# Patient Record
Sex: FEMALE | Race: WHITE | Employment: UNEMPLOYED | URBAN - METROPOLITAN AREA
[De-identification: names, ages, dates, MRNs, and addresses within clinical notes are randomized per-mention and may not be internally consistent; named-entity substitution may affect disease eponyms.]

---

## 2017-02-07 ENCOUNTER — OFFICE VISIT (OUTPATIENT)
Dept: FAMILY MEDICINE CLINIC | Age: 57
End: 2017-02-07

## 2017-02-07 VITALS
HEART RATE: 72 BPM | HEIGHT: 70 IN | TEMPERATURE: 97.4 F | SYSTOLIC BLOOD PRESSURE: 128 MMHG | BODY MASS INDEX: 28.92 KG/M2 | WEIGHT: 202 LBS | DIASTOLIC BLOOD PRESSURE: 80 MMHG

## 2017-02-07 DIAGNOSIS — E04.9 GOITER: ICD-10-CM

## 2017-02-07 DIAGNOSIS — E78.2 MIXED HYPERLIPIDEMIA: ICD-10-CM

## 2017-02-07 DIAGNOSIS — I10 ESSENTIAL HYPERTENSION, BENIGN: Primary | ICD-10-CM

## 2017-02-07 LAB
ALBUMIN SERPL-MCNC: 4.3 G/DL (ref 3.5–5)
ALP BLD-CCNC: 86 IU/L (ref 35–135)
ALT SERPL-CCNC: 26 IU/L (ref 10–60)
AST SERPL-CCNC: 25 IU/L (ref 10–40)
BILIRUB SERPL-MCNC: 1.3 MG/DL (ref 0–1.2)
BUN BLDV-MCNC: 16 MG/DL (ref 8–26)
CALCIUM SERPL-MCNC: 9.2 MG/DL (ref 8.5–10.5)
CHLORIDE BLD-SCNC: 104 MEQ/L (ref 101–111)
CHOLESTEROL, TOTAL: 196 MG/DL
CHOLESTEROL/HDL RATIO: 4.4 (ref 1.9–4.2)
CO2: 23 MEQ/L (ref 24–36)
CREAT SERPL-MCNC: 0.98 MG/DL (ref 0.44–1.03)
GFR AFRICAN AMERICAN: >60 ML/MIN/1.73 SQ METER
GFR NON-AFRICAN AMERICAN: 59 ML/MIN/1.73 SQ METER
GLUCOSE BLD-MCNC: 116 MG/DL (ref 70–99)
HCT VFR BLD CALC: 47 % (ref 36–46)
HDLC SERPL-MCNC: 45 MG/DL
HEMOGLOBIN: 15.8 G/DL (ref 12–15.2)
LDL CHOLESTEROL CALCULATED: 124 MG/DL
LDL/HDL RATIO: 2.8 (ref 1–4)
MCH RBC QN AUTO: 30 PG (ref 27–33)
MCHC RBC AUTO-ENTMCNC: 33 G/DL (ref 32–36)
MCV RBC AUTO: 90 FL (ref 82–97)
PDW BLD-RTO: 12.8 %
PLATELET # BLD: 203 THOU/MCL (ref 140–375)
POTASSIUM SERPL-SCNC: 4 MEQ/L (ref 3.6–5.1)
RBC # BLD: 5.27 MIL/MCL (ref 3.8–5.2)
SODIUM BLD-SCNC: 136 MEQ/L (ref 135–145)
TOTAL PROTEIN: 7.8 G/DL (ref 6–8)
TRIGL SERPL-MCNC: 137 MG/DL
TSH ULTRASENSITIVE: 2.62 MIU/L (ref 0.4–4.2)
WBC # BLD: 5.8 THOU/MCL (ref 3.6–10.5)

## 2017-02-07 PROCEDURE — 99213 OFFICE O/P EST LOW 20 MIN: CPT | Performed by: FAMILY MEDICINE

## 2017-02-10 ENCOUNTER — TELEPHONE (OUTPATIENT)
Dept: FAMILY MEDICINE CLINIC | Age: 57
End: 2017-02-10

## 2018-02-20 ENCOUNTER — OFFICE VISIT (OUTPATIENT)
Dept: FAMILY MEDICINE CLINIC | Age: 58
End: 2018-02-20

## 2018-02-20 VITALS
BODY MASS INDEX: 28.23 KG/M2 | HEART RATE: 68 BPM | WEIGHT: 197.2 LBS | DIASTOLIC BLOOD PRESSURE: 80 MMHG | SYSTOLIC BLOOD PRESSURE: 138 MMHG | TEMPERATURE: 98.1 F | HEIGHT: 70 IN

## 2018-02-20 DIAGNOSIS — E78.2 MIXED HYPERLIPIDEMIA: ICD-10-CM

## 2018-02-20 DIAGNOSIS — I10 ESSENTIAL HYPERTENSION, BENIGN: Primary | ICD-10-CM

## 2018-02-20 PROCEDURE — 99213 OFFICE O/P EST LOW 20 MIN: CPT | Performed by: FAMILY MEDICINE

## 2018-08-20 ENCOUNTER — OFFICE VISIT (OUTPATIENT)
Dept: FAMILY MEDICINE CLINIC | Age: 58
End: 2018-08-20

## 2018-08-20 VITALS
HEIGHT: 70 IN | DIASTOLIC BLOOD PRESSURE: 80 MMHG | HEART RATE: 68 BPM | SYSTOLIC BLOOD PRESSURE: 128 MMHG | WEIGHT: 188 LBS | TEMPERATURE: 97.6 F | BODY MASS INDEX: 26.92 KG/M2

## 2018-08-20 DIAGNOSIS — I10 ESSENTIAL HYPERTENSION, BENIGN: ICD-10-CM

## 2018-08-20 DIAGNOSIS — E78.2 MIXED HYPERLIPIDEMIA: ICD-10-CM

## 2018-08-20 DIAGNOSIS — I10 ESSENTIAL HYPERTENSION, BENIGN: Primary | ICD-10-CM

## 2018-08-20 LAB
A/G RATIO: 1.8 (ref 1.1–2.2)
ALBUMIN SERPL-MCNC: 4.9 G/DL (ref 3.4–5)
ALP BLD-CCNC: 118 U/L (ref 40–129)
ALT SERPL-CCNC: 15 U/L (ref 10–40)
ANION GAP SERPL CALCULATED.3IONS-SCNC: 16 MMOL/L (ref 3–16)
AST SERPL-CCNC: 19 U/L (ref 15–37)
BILIRUB SERPL-MCNC: 1 MG/DL (ref 0–1)
BUN BLDV-MCNC: 16 MG/DL (ref 7–20)
CALCIUM SERPL-MCNC: 10 MG/DL (ref 8.3–10.6)
CHLORIDE BLD-SCNC: 100 MMOL/L (ref 99–110)
CHOLESTEROL, TOTAL: 193 MG/DL (ref 0–199)
CO2: 25 MMOL/L (ref 21–32)
CREAT SERPL-MCNC: 0.8 MG/DL (ref 0.6–1.1)
GFR AFRICAN AMERICAN: >60
GFR NON-AFRICAN AMERICAN: >60
GLOBULIN: 2.7 G/DL
GLUCOSE BLD-MCNC: 112 MG/DL (ref 70–99)
HDLC SERPL-MCNC: 54 MG/DL (ref 40–60)
LDL CHOLESTEROL CALCULATED: 115 MG/DL
POTASSIUM SERPL-SCNC: 4.8 MMOL/L (ref 3.5–5.1)
SODIUM BLD-SCNC: 141 MMOL/L (ref 136–145)
TOTAL PROTEIN: 7.6 G/DL (ref 6.4–8.2)
TRIGL SERPL-MCNC: 118 MG/DL (ref 0–150)
VLDLC SERPL CALC-MCNC: 24 MG/DL

## 2018-08-20 PROCEDURE — 99213 OFFICE O/P EST LOW 20 MIN: CPT | Performed by: FAMILY MEDICINE

## 2018-08-20 RX ORDER — ATORVASTATIN CALCIUM 20 MG/1
20 TABLET, FILM COATED ORAL DAILY
Qty: 30 TABLET | Refills: 5 | Status: SHIPPED | OUTPATIENT
Start: 2018-08-20 | End: 2019-03-05 | Stop reason: SDUPTHER

## 2018-08-20 RX ORDER — AMLODIPINE BESYLATE 10 MG/1
10 TABLET ORAL DAILY
Qty: 30 TABLET | Refills: 5 | Status: SHIPPED | OUTPATIENT
Start: 2018-08-20 | End: 2019-03-05 | Stop reason: SDUPTHER

## 2018-08-20 ASSESSMENT — PATIENT HEALTH QUESTIONNAIRE - PHQ9
SUM OF ALL RESPONSES TO PHQ9 QUESTIONS 1 & 2: 0
SUM OF ALL RESPONSES TO PHQ QUESTIONS 1-9: 0
SUM OF ALL RESPONSES TO PHQ QUESTIONS 1-9: 0
2. FEELING DOWN, DEPRESSED OR HOPELESS: 0
1. LITTLE INTEREST OR PLEASURE IN DOING THINGS: 0

## 2018-08-20 NOTE — PATIENT INSTRUCTIONS
Stay with the diet  Do remember to get the colon check this year  Do consider getting the new shingle vaccine called Shingrix.

## 2018-08-20 NOTE — PROGRESS NOTES
Subjective:      Patient ID: Leonardo Regalado is a 61 y.o. female. Patient presents with:  6 Month Follow-Up: hypertension, lipids - pt is fasting    She is well and no c/o  She is due for colon  Still the same meds    YOB: 1960    Date of Visit:  8/20/2018     -- Peanut-Containing Drug Products -- Rash   -- Soybean-Containing Drug Products -- Rash   -- Ace Inhibitors    -- Aspirin     --  hives   -- Buckwheat    -- Cashew Nut Oil     --  Cashew protein   -- Nsaids    -- Other     --  Hazelnut, chick peas    Current Outpatient Prescriptions:  atorvastatin (LIPITOR) 20 MG tablet, TAKE ONE TABLET BY MOUTH DAILY, Disp: 30 tablet, Rfl: 5  amLODIPine (NORVASC) 10 MG tablet, TAKE ONE TABLET BY MOUTH DAILY, Disp: 30 tablet, Rfl: 5  LORazepam (ATIVAN) 1 MG tablet, Take 1 mg by mouth every 6 hours as needed for Anxiety, Disp: , Rfl:   fluticasone (FLONASE) 50 MCG/ACT nasal spray, 1 spray by Nasal route daily. , Disp: , Rfl:   metoprolol (TOPROL-XL) 25 MG XL tablet, Take 25 mg by mouth daily. , Disp: , Rfl:   EPINEPHrine (EPIPEN) 0.3 MG/0.3ML JO injection, Inject 0.3 mg into the muscle as needed. Use as directed for allergic reaction, Disp: , Rfl:   beclomethasone (QVAR) 40 MCG/ACT inhaler, Inhale 2 puffs into the lungs 2 times daily. , Disp: , Rfl:   spironolactone (ALDACTONE) 25 MG tablet, Take 25 mg by mouth 2 times daily. , Disp: , Rfl:   Loratadine 10 MG CAPS, Take 1 capsule by mouth daily. , Disp: , Rfl:   levalbuterol (XOPENEX) 0.63 MG/3ML nebulization, Take 1 ampule by nebulization every 4 hours as needed. , Disp: , Rfl:   levocetirizine (XYZAL) 5 MG tablet, Take 5 mg by mouth daily. , Disp: , Rfl:      No current facility-administered medications for this visit.       ---------------------------               08/20/18                      0857         ---------------------------   BP:          128/80         Site:       Left Arm        Position:     Sitting        Cuff Size: Large Adult      Pulse:         68           Temp:   97.6 °F (36.4 °C)   TempSrc:      Oral          Weight: 188 lb (85.3 kg)    Height: 5' 10\" (1.778 m)   ---------------------------  Body mass index is 26.98 kg/m². Wt Readings from Last 3 Encounters:      She is intentionally losing weight  08/20/18 : 188 lb (85.3 kg)  02/20/18 : 197 lb 3.2 oz (89.4 kg)  03/06/17 : 199 lb (90.3 kg)    BP Readings from Last 3 Encounters:  08/20/18 : 128/80  02/20/18 : 138/80  03/06/17 : 128/78          Review of Systems    Objective:   Physical Exam   Constitutional: She appears well-developed and well-nourished. No distress. Cardiovascular: Normal rate, regular rhythm and normal heart sounds. Exam reveals no gallop and no friction rub. No murmur heard. Pulmonary/Chest: Effort normal and breath sounds normal. No accessory muscle usage. No tachypnea. No respiratory distress. She has no decreased breath sounds. She has no wheezes. She has no rhonchi. She has no rales. Lymphadenopathy:     She has no cervical adenopathy. Right: No supraclavicular adenopathy present. Left: No supraclavicular adenopathy present. Neurological: She is alert. Skin: Skin is warm, dry and intact. She is not diaphoretic. No pallor. Assessment:        Diagnosis Orders   1. Essential hypertension, benign  Comprehensive Metabolic Panel    Lipid Panel   2. Mixed hyperlipidemia  Comprehensive Metabolic Panel    Lipid Panel       Overall well and no issues        Plan:      Stay with the diet  Do remember to get the colon check this year  Do consider getting the new shingle vaccine called Shingrix.          Juliano Bennett MD

## 2018-08-21 DIAGNOSIS — R73.09 ELEVATED GLUCOSE: Primary | ICD-10-CM

## 2018-08-22 DIAGNOSIS — R73.09 ELEVATED GLUCOSE: ICD-10-CM

## 2018-08-22 LAB
ESTIMATED AVERAGE GLUCOSE: 122.6 MG/DL
HBA1C MFR BLD: 5.9 %

## 2018-10-15 ENCOUNTER — HOSPITAL ENCOUNTER (OUTPATIENT)
Dept: WOMENS IMAGING | Age: 58
Discharge: HOME | End: 2018-10-15
Payer: COMMERCIAL

## 2018-10-15 DIAGNOSIS — Z12.31 VISIT FOR SCREENING MAMMOGRAM: ICD-10-CM

## 2018-10-15 PROCEDURE — 77063 BREAST TOMOSYNTHESIS BI: CPT

## 2018-11-28 ENCOUNTER — ANESTHESIA EVENT (OUTPATIENT)
Dept: ENDOSCOPY | Age: 58
End: 2018-11-28
Payer: COMMERCIAL

## 2018-11-28 RX ORDER — ACETAMINOPHEN 325 MG/1
650 TABLET ORAL EVERY 6 HOURS PRN
COMMUNITY

## 2018-11-29 ENCOUNTER — ANESTHESIA (OUTPATIENT)
Dept: ENDOSCOPY | Age: 58
End: 2018-11-29
Payer: COMMERCIAL

## 2018-11-29 ENCOUNTER — HOSPITAL ENCOUNTER (OUTPATIENT)
Age: 58
Setting detail: OUTPATIENT SURGERY
Discharge: HOME | End: 2018-11-29
Attending: INTERNAL MEDICINE | Admitting: INTERNAL MEDICINE
Payer: COMMERCIAL

## 2018-11-29 VITALS
TEMPERATURE: 96.9 F | RESPIRATION RATE: 16 BRPM | BODY MASS INDEX: 27.35 KG/M2 | OXYGEN SATURATION: 99 % | HEART RATE: 69 BPM | DIASTOLIC BLOOD PRESSURE: 76 MMHG | HEIGHT: 70 IN | WEIGHT: 191.03 LBS | SYSTOLIC BLOOD PRESSURE: 143 MMHG

## 2018-11-29 VITALS — DIASTOLIC BLOOD PRESSURE: 67 MMHG | OXYGEN SATURATION: 98 % | SYSTOLIC BLOOD PRESSURE: 139 MMHG

## 2018-11-29 DIAGNOSIS — Z12.11 COLON CANCER SCREENING: ICD-10-CM

## 2018-11-29 PROCEDURE — 3700000000 HC ANESTHESIA ATTENDED CARE: Performed by: INTERNAL MEDICINE

## 2018-11-29 PROCEDURE — S0028 INJECTION, FAMOTIDINE, 20 MG: HCPCS | Performed by: ANESTHESIOLOGY

## 2018-11-29 PROCEDURE — 88305 TISSUE EXAM BY PATHOLOGIST: CPT

## 2018-11-29 PROCEDURE — 2500000003 HC RX 250 WO HCPCS: Performed by: ANESTHESIOLOGY

## 2018-11-29 PROCEDURE — 6360000002 HC RX W HCPCS

## 2018-11-29 PROCEDURE — 2709999900 HC NON-CHARGEABLE SUPPLY: Performed by: INTERNAL MEDICINE

## 2018-11-29 PROCEDURE — 7100000011 HC PHASE II RECOVERY - ADDTL 15 MIN: Performed by: INTERNAL MEDICINE

## 2018-11-29 PROCEDURE — 7100000001 HC PACU RECOVERY - ADDTL 15 MIN: Performed by: INTERNAL MEDICINE

## 2018-11-29 PROCEDURE — 7100000000 HC PACU RECOVERY - FIRST 15 MIN: Performed by: INTERNAL MEDICINE

## 2018-11-29 PROCEDURE — 3700000001 HC ADD 15 MINUTES (ANESTHESIA): Performed by: INTERNAL MEDICINE

## 2018-11-29 PROCEDURE — 3609010600 HC COLONOSCOPY POLYPECTOMY SNARE/COLD BIOPSY: Performed by: INTERNAL MEDICINE

## 2018-11-29 PROCEDURE — 2580000003 HC RX 258: Performed by: ANESTHESIOLOGY

## 2018-11-29 PROCEDURE — 7100000010 HC PHASE II RECOVERY - FIRST 15 MIN: Performed by: INTERNAL MEDICINE

## 2018-11-29 PROCEDURE — 2500000003 HC RX 250 WO HCPCS

## 2018-11-29 PROCEDURE — 2580000003 HC RX 258

## 2018-11-29 RX ORDER — PROPOFOL 10 MG/ML
INJECTION, EMULSION INTRAVENOUS PRN
Status: DISCONTINUED | OUTPATIENT
Start: 2018-11-29 | End: 2018-11-29 | Stop reason: SDUPTHER

## 2018-11-29 RX ORDER — SODIUM CHLORIDE 0.9 % (FLUSH) 0.9 %
10 SYRINGE (ML) INJECTION EVERY 12 HOURS SCHEDULED
Status: DISCONTINUED | OUTPATIENT
Start: 2018-11-29 | End: 2018-11-29 | Stop reason: HOSPADM

## 2018-11-29 RX ORDER — LABETALOL HYDROCHLORIDE 5 MG/ML
5 INJECTION, SOLUTION INTRAVENOUS EVERY 10 MIN PRN
Status: DISCONTINUED | OUTPATIENT
Start: 2018-11-29 | End: 2018-11-29 | Stop reason: HOSPADM

## 2018-11-29 RX ORDER — SODIUM CHLORIDE 9 MG/ML
INJECTION, SOLUTION INTRAVENOUS CONTINUOUS
Status: DISCONTINUED | OUTPATIENT
Start: 2018-11-29 | End: 2018-11-29 | Stop reason: HOSPADM

## 2018-11-29 RX ORDER — PROPOFOL 10 MG/ML
INJECTION, EMULSION INTRAVENOUS CONTINUOUS PRN
Status: DISCONTINUED | OUTPATIENT
Start: 2018-11-29 | End: 2018-11-29 | Stop reason: SDUPTHER

## 2018-11-29 RX ORDER — PROMETHAZINE HYDROCHLORIDE 25 MG/ML
6.25 INJECTION, SOLUTION INTRAMUSCULAR; INTRAVENOUS
Status: DISCONTINUED | OUTPATIENT
Start: 2018-11-29 | End: 2018-11-29 | Stop reason: HOSPADM

## 2018-11-29 RX ORDER — SODIUM CHLORIDE 0.9 % (FLUSH) 0.9 %
10 SYRINGE (ML) INJECTION PRN
Status: DISCONTINUED | OUTPATIENT
Start: 2018-11-29 | End: 2018-11-29 | Stop reason: HOSPADM

## 2018-11-29 RX ORDER — ONDANSETRON 2 MG/ML
4 INJECTION INTRAMUSCULAR; INTRAVENOUS
Status: DISCONTINUED | OUTPATIENT
Start: 2018-11-29 | End: 2018-11-29 | Stop reason: HOSPADM

## 2018-11-29 RX ORDER — MIDAZOLAM HYDROCHLORIDE 1 MG/ML
INJECTION INTRAMUSCULAR; INTRAVENOUS PRN
Status: DISCONTINUED | OUTPATIENT
Start: 2018-11-29 | End: 2018-11-29 | Stop reason: SDUPTHER

## 2018-11-29 RX ORDER — SODIUM CHLORIDE 9 MG/ML
INJECTION, SOLUTION INTRAVENOUS CONTINUOUS PRN
Status: DISCONTINUED | OUTPATIENT
Start: 2018-11-29 | End: 2018-11-29 | Stop reason: SDUPTHER

## 2018-11-29 RX ORDER — LIDOCAINE HYDROCHLORIDE 20 MG/ML
INJECTION, SOLUTION EPIDURAL; INFILTRATION; INTRACAUDAL; PERINEURAL PRN
Status: DISCONTINUED | OUTPATIENT
Start: 2018-11-29 | End: 2018-11-29 | Stop reason: SDUPTHER

## 2018-11-29 RX ADMIN — PROPOFOL 140 MCG/KG/MIN: 10 INJECTION, EMULSION INTRAVENOUS at 11:35

## 2018-11-29 RX ADMIN — LIDOCAINE HYDROCHLORIDE 40 MG: 20 INJECTION, SOLUTION EPIDURAL; INFILTRATION; INTRACAUDAL; PERINEURAL at 11:35

## 2018-11-29 RX ADMIN — FAMOTIDINE 20 MG: 10 INJECTION, SOLUTION INTRAVENOUS at 10:58

## 2018-11-29 RX ADMIN — SODIUM CHLORIDE: 9 INJECTION, SOLUTION INTRAVENOUS at 10:58

## 2018-11-29 RX ADMIN — PROPOFOL 40 MG: 10 INJECTION, EMULSION INTRAVENOUS at 11:40

## 2018-11-29 RX ADMIN — PROPOFOL 80 MG: 10 INJECTION, EMULSION INTRAVENOUS at 11:35

## 2018-11-29 RX ADMIN — MIDAZOLAM 2 MG: 1 INJECTION INTRAMUSCULAR; INTRAVENOUS at 11:40

## 2018-11-29 ASSESSMENT — PULMONARY FUNCTION TESTS
PIF_VALUE: 0

## 2018-11-29 ASSESSMENT — PAIN - FUNCTIONAL ASSESSMENT: PAIN_FUNCTIONAL_ASSESSMENT: 0-10

## 2018-11-29 ASSESSMENT — PAIN SCALES - GENERAL
PAINLEVEL_OUTOF10: 0
PAINLEVEL_OUTOF10: 0

## 2018-11-29 NOTE — ANESTHESIA PRE PROCEDURE
138/80     BMI  Body mass index is 27.41 kg/m². Estimated body mass index is 27.41 kg/m² as calculated from the following:    Height as of this encounter: 5' 10\" (1.778 m). Weight as of this encounter: 191 lb 0.5 oz (86.7 kg). CBC   Lab Results   Component Value Date    WBC 6.2 02/20/2017    WBC 5.8 02/07/2017    RBC 5.23 02/20/2017    HGB 16.2 02/20/2017    HCT 49.8 02/20/2017    MCV 95.2 02/20/2017    RDW 13.3 02/20/2017     02/20/2017     CMP    Lab Results   Component Value Date     08/20/2018    K 4.8 08/20/2018     08/20/2018    CO2 25 08/20/2018    BUN 16 08/20/2018    CREATININE 0.8 08/20/2018    GFRAA >60 08/20/2018    GFRAA >60 03/21/2013    AGRATIO 1.8 08/20/2018    LABGLOM >60 08/20/2018    GLUCOSE 112 08/20/2018    PROT 7.6 08/20/2018    PROT 7.5 03/21/2013    CALCIUM 10.0 08/20/2018    BILITOT 1.0 08/20/2018    ALKPHOS 118 08/20/2018    AST 19 08/20/2018    ALT 15 08/20/2018     BMP    Lab Results   Component Value Date     08/20/2018    K 4.8 08/20/2018     08/20/2018    CO2 25 08/20/2018    BUN 16 08/20/2018    CREATININE 0.8 08/20/2018    CALCIUM 10.0 08/20/2018    GFRAA >60 08/20/2018    GFRAA >60 03/21/2013    LABGLOM >60 08/20/2018    GLUCOSE 112 08/20/2018     POCGlucose  No results for input(s): GLUCOSE in the last 72 hours.    Coags  No results found for: PROTIME, INR, APTT  HCG (If Applicable)   Lab Results   Component Value Date    PREGTESTUR negative 10/24/2014      ABGs No results found for: PHART, PO2ART, ACN8GNE, PYO3MZD, BEART, X9FVAOWA   Type & Screen (If Applicable)  No results found for: LABABO, LABRH                         BMI: Wt Readings from Last 3 Encounters:       NPO Status:   Date of last liquid consumption: 11/29/18   Time of last liquid consumption: 0700   Date of last solid food consumption: 11/28/18      Time of last solid consumption: 0830       Anesthesia Evaluation  Patient summary reviewed no history of anesthetic complications:

## 2019-02-21 ENCOUNTER — OFFICE VISIT (OUTPATIENT)
Dept: FAMILY MEDICINE CLINIC | Age: 59
End: 2019-02-21
Payer: COMMERCIAL

## 2019-02-21 VITALS
TEMPERATURE: 97.5 F | WEIGHT: 206 LBS | SYSTOLIC BLOOD PRESSURE: 124 MMHG | HEART RATE: 76 BPM | DIASTOLIC BLOOD PRESSURE: 80 MMHG | HEIGHT: 70 IN | BODY MASS INDEX: 29.49 KG/M2

## 2019-02-21 DIAGNOSIS — E78.2 MIXED HYPERLIPIDEMIA: ICD-10-CM

## 2019-02-21 DIAGNOSIS — I10 ESSENTIAL HYPERTENSION, BENIGN: Primary | ICD-10-CM

## 2019-02-21 DIAGNOSIS — I10 ESSENTIAL HYPERTENSION, BENIGN: ICD-10-CM

## 2019-02-21 DIAGNOSIS — Z23 NEEDS FLU SHOT: ICD-10-CM

## 2019-02-21 LAB
A/G RATIO: 1.5 (ref 1.1–2.2)
ALBUMIN SERPL-MCNC: 4.7 G/DL (ref 3.4–5)
ALP BLD-CCNC: 110 U/L (ref 40–129)
ALT SERPL-CCNC: 20 U/L (ref 10–40)
ANION GAP SERPL CALCULATED.3IONS-SCNC: 16 MMOL/L (ref 3–16)
AST SERPL-CCNC: 26 U/L (ref 15–37)
BASOPHILS ABSOLUTE: 0.1 K/UL (ref 0–0.2)
BASOPHILS RELATIVE PERCENT: 0.9 %
BILIRUB SERPL-MCNC: 1 MG/DL (ref 0–1)
BUN BLDV-MCNC: 22 MG/DL (ref 7–20)
CALCIUM SERPL-MCNC: 9.8 MG/DL (ref 8.3–10.6)
CHLORIDE BLD-SCNC: 103 MMOL/L (ref 99–110)
CHOLESTEROL, TOTAL: 180 MG/DL (ref 0–199)
CO2: 23 MMOL/L (ref 21–32)
CREAT SERPL-MCNC: 1 MG/DL (ref 0.6–1.1)
EOSINOPHILS ABSOLUTE: 0.1 K/UL (ref 0–0.6)
EOSINOPHILS RELATIVE PERCENT: 2 %
GFR AFRICAN AMERICAN: >60
GFR NON-AFRICAN AMERICAN: 57
GLOBULIN: 3.2 G/DL
GLUCOSE BLD-MCNC: 107 MG/DL (ref 70–99)
HCT VFR BLD CALC: 43.4 % (ref 36–48)
HDLC SERPL-MCNC: 55 MG/DL (ref 40–60)
HEMOGLOBIN: 13.8 G/DL (ref 12–16)
LDL CHOLESTEROL CALCULATED: 97 MG/DL
LYMPHOCYTES ABSOLUTE: 1.3 K/UL (ref 1–5.1)
LYMPHOCYTES RELATIVE PERCENT: 23.4 %
MCH RBC QN AUTO: 27.3 PG (ref 26–34)
MCHC RBC AUTO-ENTMCNC: 31.9 G/DL (ref 31–36)
MCV RBC AUTO: 85.4 FL (ref 80–100)
MONOCYTES ABSOLUTE: 0.5 K/UL (ref 0–1.3)
MONOCYTES RELATIVE PERCENT: 9.2 %
NEUTROPHILS ABSOLUTE: 3.6 K/UL (ref 1.7–7.7)
NEUTROPHILS RELATIVE PERCENT: 64.5 %
PDW BLD-RTO: 17.5 % (ref 12.4–15.4)
PLATELET # BLD: 256 K/UL (ref 135–450)
PMV BLD AUTO: 9.3 FL (ref 5–10.5)
POTASSIUM SERPL-SCNC: 4.4 MMOL/L (ref 3.5–5.1)
RBC # BLD: 5.08 M/UL (ref 4–5.2)
SODIUM BLD-SCNC: 142 MMOL/L (ref 136–145)
TOTAL PROTEIN: 7.9 G/DL (ref 6.4–8.2)
TRIGL SERPL-MCNC: 138 MG/DL (ref 0–150)
VLDLC SERPL CALC-MCNC: 28 MG/DL
WBC # BLD: 5.6 K/UL (ref 4–11)

## 2019-02-21 PROCEDURE — 90686 IIV4 VACC NO PRSV 0.5 ML IM: CPT | Performed by: FAMILY MEDICINE

## 2019-02-21 PROCEDURE — 99213 OFFICE O/P EST LOW 20 MIN: CPT | Performed by: FAMILY MEDICINE

## 2019-02-21 PROCEDURE — 90471 IMMUNIZATION ADMIN: CPT | Performed by: FAMILY MEDICINE

## 2019-02-21 ASSESSMENT — ENCOUNTER SYMPTOMS
DIARRHEA: 0
ABDOMINAL DISTENTION: 0
CONSTIPATION: 0
SHORTNESS OF BREATH: 0
NAUSEA: 0
ABDOMINAL PAIN: 0
BLOOD IN STOOL: 0
VOMITING: 0
CHEST TIGHTNESS: 0

## 2019-02-21 ASSESSMENT — PATIENT HEALTH QUESTIONNAIRE - PHQ9
SUM OF ALL RESPONSES TO PHQ9 QUESTIONS 1 & 2: 0
1. LITTLE INTEREST OR PLEASURE IN DOING THINGS: 0
2. FEELING DOWN, DEPRESSED OR HOPELESS: 0
SUM OF ALL RESPONSES TO PHQ QUESTIONS 1-9: 0
SUM OF ALL RESPONSES TO PHQ QUESTIONS 1-9: 0

## 2019-03-05 RX ORDER — ATORVASTATIN CALCIUM 20 MG/1
TABLET, FILM COATED ORAL
Qty: 30 TABLET | Refills: 5 | Status: SHIPPED | OUTPATIENT
Start: 2019-03-05 | End: 2019-09-05 | Stop reason: SDUPTHER

## 2019-03-05 RX ORDER — AMLODIPINE BESYLATE 10 MG/1
TABLET ORAL
Qty: 30 TABLET | Refills: 5 | Status: SHIPPED | OUTPATIENT
Start: 2019-03-05 | End: 2019-09-05 | Stop reason: SDUPTHER

## 2019-08-23 ENCOUNTER — OFFICE VISIT (OUTPATIENT)
Dept: FAMILY MEDICINE CLINIC | Age: 59
End: 2019-08-23
Payer: COMMERCIAL

## 2019-08-23 VITALS
TEMPERATURE: 97.5 F | DIASTOLIC BLOOD PRESSURE: 80 MMHG | WEIGHT: 205 LBS | SYSTOLIC BLOOD PRESSURE: 120 MMHG | HEART RATE: 72 BPM | HEIGHT: 70 IN | BODY MASS INDEX: 29.35 KG/M2

## 2019-08-23 DIAGNOSIS — R73.09 ELEVATED GLUCOSE: ICD-10-CM

## 2019-08-23 DIAGNOSIS — E78.2 MIXED HYPERLIPIDEMIA: ICD-10-CM

## 2019-08-23 DIAGNOSIS — R79.89 ABNORMAL CBC: ICD-10-CM

## 2019-08-23 DIAGNOSIS — I10 ESSENTIAL HYPERTENSION, BENIGN: Primary | ICD-10-CM

## 2019-08-23 DIAGNOSIS — I10 ESSENTIAL HYPERTENSION, BENIGN: ICD-10-CM

## 2019-08-23 LAB
A/G RATIO: 1.5 (ref 1.1–2.2)
ALBUMIN SERPL-MCNC: 4.7 G/DL (ref 3.4–5)
ALP BLD-CCNC: 118 U/L (ref 40–129)
ALT SERPL-CCNC: 19 U/L (ref 10–40)
ANION GAP SERPL CALCULATED.3IONS-SCNC: 13 MMOL/L (ref 3–16)
AST SERPL-CCNC: 21 U/L (ref 15–37)
BASOPHILS ABSOLUTE: 0 K/UL (ref 0–0.2)
BASOPHILS RELATIVE PERCENT: 0.9 %
BILIRUB SERPL-MCNC: 1 MG/DL (ref 0–1)
BUN BLDV-MCNC: 17 MG/DL (ref 7–20)
CALCIUM SERPL-MCNC: 9.9 MG/DL (ref 8.3–10.6)
CHLORIDE BLD-SCNC: 102 MMOL/L (ref 99–110)
CHOLESTEROL, TOTAL: 180 MG/DL (ref 0–199)
CO2: 24 MMOL/L (ref 21–32)
CREAT SERPL-MCNC: 1 MG/DL (ref 0.6–1.1)
EOSINOPHILS ABSOLUTE: 0.1 K/UL (ref 0–0.6)
EOSINOPHILS RELATIVE PERCENT: 1.1 %
GFR AFRICAN AMERICAN: >60
GFR NON-AFRICAN AMERICAN: 57
GLOBULIN: 3.2 G/DL
GLUCOSE BLD-MCNC: 116 MG/DL (ref 70–99)
HCT VFR BLD CALC: 41.3 % (ref 36–48)
HDLC SERPL-MCNC: 54 MG/DL (ref 40–60)
HEMOGLOBIN: 13.4 G/DL (ref 12–16)
LDL CHOLESTEROL CALCULATED: 102 MG/DL
LYMPHOCYTES ABSOLUTE: 1.1 K/UL (ref 1–5.1)
LYMPHOCYTES RELATIVE PERCENT: 22.1 %
MCH RBC QN AUTO: 27.2 PG (ref 26–34)
MCHC RBC AUTO-ENTMCNC: 32.4 G/DL (ref 31–36)
MCV RBC AUTO: 84 FL (ref 80–100)
MONOCYTES ABSOLUTE: 0.4 K/UL (ref 0–1.3)
MONOCYTES RELATIVE PERCENT: 7.5 %
NEUTROPHILS ABSOLUTE: 3.5 K/UL (ref 1.7–7.7)
NEUTROPHILS RELATIVE PERCENT: 68.4 %
PDW BLD-RTO: 20.4 % (ref 12.4–15.4)
PLATELET # BLD: 220 K/UL (ref 135–450)
PMV BLD AUTO: 9.1 FL (ref 5–10.5)
POTASSIUM SERPL-SCNC: 4.5 MMOL/L (ref 3.5–5.1)
RBC # BLD: 4.91 M/UL (ref 4–5.2)
SODIUM BLD-SCNC: 139 MMOL/L (ref 136–145)
TOTAL PROTEIN: 7.9 G/DL (ref 6.4–8.2)
TRIGL SERPL-MCNC: 121 MG/DL (ref 0–150)
VLDLC SERPL CALC-MCNC: 24 MG/DL
WBC # BLD: 5.1 K/UL (ref 4–11)

## 2019-08-23 PROCEDURE — 99214 OFFICE O/P EST MOD 30 MIN: CPT | Performed by: FAMILY MEDICINE

## 2019-08-23 ASSESSMENT — ENCOUNTER SYMPTOMS
CONSTIPATION: 0
ABDOMINAL PAIN: 0
VOMITING: 0
ABDOMINAL DISTENTION: 0
SHORTNESS OF BREATH: 0
CHEST TIGHTNESS: 0
BLOOD IN STOOL: 0
DIARRHEA: 0
NAUSEA: 0

## 2019-08-23 NOTE — PROGRESS NOTES
0757         ---------------------------   BP:          120/80         Site:    Left Upper Arm     Position:     Sitting        Cuff Size:  Medium Adult      Pulse:         72           Temp:   97.5 °F (36.4 °C)   TempSrc:      Oral          Weight:  205 lb (93 kg)     Height: 5' 10\" (1.778 m)   ---------------------------  Body mass index is 29.41 kg/m². Wt Readings from Last 3 Encounters:  08/23/19 : 205 lb (93 kg)  02/21/19 : 206 lb (93.4 kg)  11/29/18 : 191 lb 0.5 oz (86.7 kg)    BP Readings from Last 3 Encounters:  08/23/19 : 120/80  02/21/19 : 124/80  11/29/18 : 139/67        Review of Systems   Constitutional: Negative for appetite change, chills, fever and unexpected weight change. Respiratory: Negative for chest tightness and shortness of breath. Cardiovascular: Negative for chest pain, palpitations and leg swelling. Gastrointestinal: Negative for abdominal distention, abdominal pain, blood in stool, constipation, diarrhea, nausea and vomiting. Genitourinary: Negative for difficulty urinating, dysuria and hematuria. Neurological: Negative for headaches. Objective:   Physical Exam   Constitutional: She appears well-developed and well-nourished. No distress. Cardiovascular: Normal rate, regular rhythm and normal heart sounds. Exam reveals no gallop and no friction rub. No murmur heard. Pulmonary/Chest: Effort normal and breath sounds normal. No accessory muscle usage. No tachypnea. No respiratory distress. She has no decreased breath sounds. She has no wheezes. She has no rhonchi. She has no rales. Abdominal: Soft. Normal appearance and bowel sounds are normal. She exhibits no distension, no abdominal bruit and no mass. There is no hepatosplenomegaly. There is no tenderness. There is no guarding. Lymphadenopathy:     She has no cervical adenopathy. Right: No supraclavicular adenopathy present.         Left: No supraclavicular adenopathy present. Neurological: She is alert. Skin: Skin is warm, dry and intact. She is not diaphoretic. No cyanosis. No pallor. Nails show no clubbing. Psychiatric: She has a normal mood and affect. Assessment:        Diagnosis Orders   1. Essential hypertension, benign  Comprehensive Metabolic Panel    Lipid Panel   2. Mixed hyperlipidemia  Comprehensive Metabolic Panel    Lipid Panel   3. Elevated glucose  Comprehensive Metabolic Panel    Hemoglobin A1C       Overall well  She recently seen new allergist and is working well  She will be monitored here for the cbc now. Dr Edith Javier office felt they did not need to see any more. She donates blood regular.        Plan:      Continue the medicines  Stay with the diet  See in 6 months        [place] MD Abel

## 2019-08-24 LAB
ESTIMATED AVERAGE GLUCOSE: 125.5 MG/DL
HBA1C MFR BLD: 6 %

## 2019-08-26 DIAGNOSIS — R79.89 ABNORMAL CBC MEASUREMENT: Primary | ICD-10-CM

## 2019-08-27 DIAGNOSIS — R79.89 ABNORMAL CBC MEASUREMENT: ICD-10-CM

## 2019-08-27 LAB
BLOOD SMEAR REVIEW: NORMAL
FOLATE: 17 NG/ML (ref 4.78–24.2)
HCT VFR BLD CALC: 40.2 % (ref 36–48)
HEMOGLOBIN: 13.5 G/DL (ref 12–16)
IRON SATURATION: 15 % (ref 15–50)
IRON: 54 UG/DL (ref 37–145)
MCH RBC QN AUTO: 27.5 PG (ref 26–34)
MCHC RBC AUTO-ENTMCNC: 33.5 G/DL (ref 31–36)
MCV RBC AUTO: 82.1 FL (ref 80–100)
PDW BLD-RTO: 19.9 % (ref 12.4–15.4)
PLATELET # BLD: 231 K/UL (ref 135–450)
PMV BLD AUTO: 8.7 FL (ref 5–10.5)
RBC # BLD: 4.9 M/UL (ref 4–5.2)
TOTAL IRON BINDING CAPACITY: 364 UG/DL (ref 260–445)
VITAMIN B-12: 433 PG/ML (ref 211–911)
WBC # BLD: 5.4 K/UL (ref 4–11)

## 2019-08-28 LAB — HEMATOLOGY PATH CONSULT: NORMAL

## 2019-09-05 RX ORDER — AMLODIPINE BESYLATE 10 MG/1
TABLET ORAL
Qty: 30 TABLET | Refills: 4 | Status: SHIPPED | OUTPATIENT
Start: 2019-09-05 | End: 2020-02-04

## 2019-09-05 RX ORDER — ATORVASTATIN CALCIUM 20 MG/1
TABLET, FILM COATED ORAL
Qty: 30 TABLET | Refills: 4 | Status: SHIPPED | OUTPATIENT
Start: 2019-09-05 | End: 2020-02-04

## 2019-09-17 ENCOUNTER — OFFICE VISIT (OUTPATIENT)
Dept: DERMATOLOGY | Age: 59
End: 2019-09-17
Payer: COMMERCIAL

## 2019-09-17 DIAGNOSIS — L82.0 SEBORRHEIC KERATOSES, INFLAMED: Primary | ICD-10-CM

## 2019-09-17 DIAGNOSIS — D22.9 MULTIPLE BENIGN MELANOCYTIC NEVI: ICD-10-CM

## 2019-09-17 DIAGNOSIS — Z12.83 SKIN CANCER SCREENING: ICD-10-CM

## 2019-09-17 PROCEDURE — 11102 TANGNTL BX SKIN SINGLE LES: CPT | Performed by: DERMATOLOGY

## 2019-09-17 PROCEDURE — 99203 OFFICE O/P NEW LOW 30 MIN: CPT | Performed by: DERMATOLOGY

## 2019-09-17 PROCEDURE — 11103 TANGNTL BX SKIN EA SEP/ADDL: CPT | Performed by: DERMATOLOGY

## 2019-09-17 PROCEDURE — 17110 DESTRUCTION B9 LES UP TO 14: CPT | Performed by: DERMATOLOGY

## 2019-09-17 NOTE — PROGRESS NOTES
2 years and prn changes  Discussed plan with patient and/or primary caretaker. Patient to call clinic with any questions / concerns. Reviewed side effects of treatment(s) and/or medication(s) with patient and/or primary caretaker.    AVS provided or is available on [facility]   ____________________________________________________________________________   Emani Jolley MD, MPH, FAAD  [facility], Cristian Gallo

## 2019-09-17 NOTE — LETTER
[facility]  [address] [place] [address]  Phone: [phone]  Fax: [phone]    Kim Mtz MD        September 17, 2019     Juan Carlos Munoz MD  [address] [place] [address]    Patient: Yee Arteaga  MR Number: [id number]  YOB: 1960  Date of Visit: 9/17/2019    Dear Dr. Juan Carlos Munoz:    Thank you for the request for consultation for Ernestine Sahu to me for the evaluation of moles. Below are the relevant portions of my assessment and plan of care. If you have questions, please do not hesitate to call me. I look forward to following [facility] along with you.     Sincerely,        Kim Mtz MD

## 2019-10-15 ENCOUNTER — HOSPITAL ENCOUNTER (OUTPATIENT)
Dept: WOMENS IMAGING | Age: 59
Discharge: HOME | End: 2019-10-15
Payer: COMMERCIAL

## 2019-10-15 DIAGNOSIS — Z12.31 BREAST CANCER SCREENING BY MAMMOGRAM: ICD-10-CM

## 2019-10-15 PROCEDURE — 77063 BREAST TOMOSYNTHESIS BI: CPT

## 2019-10-17 PROBLEM — Z12.83 SKIN CANCER SCREENING: Status: RESOLVED | Noted: 2019-09-17 | Resolved: 2019-10-17

## 2020-02-04 RX ORDER — ATORVASTATIN CALCIUM 20 MG/1
TABLET, FILM COATED ORAL
Qty: 30 TABLET | Refills: 5 | Status: SHIPPED | OUTPATIENT
Start: 2020-02-04 | End: 2020-07-30

## 2020-02-04 RX ORDER — AMLODIPINE BESYLATE 10 MG/1
TABLET ORAL
Qty: 30 TABLET | Refills: 5 | Status: SHIPPED | OUTPATIENT
Start: 2020-02-04 | End: 2020-07-30

## 2020-03-06 ENCOUNTER — OFFICE VISIT (OUTPATIENT)
Dept: FAMILY MEDICINE CLINIC | Age: 60
End: 2020-03-06
Payer: COMMERCIAL

## 2020-03-06 VITALS
DIASTOLIC BLOOD PRESSURE: 80 MMHG | WEIGHT: 205 LBS | TEMPERATURE: 97.3 F | HEART RATE: 72 BPM | SYSTOLIC BLOOD PRESSURE: 122 MMHG | HEIGHT: 70 IN | BODY MASS INDEX: 29.35 KG/M2

## 2020-03-06 DIAGNOSIS — I10 ESSENTIAL HYPERTENSION, BENIGN: ICD-10-CM

## 2020-03-06 DIAGNOSIS — E78.2 MIXED HYPERLIPIDEMIA: ICD-10-CM

## 2020-03-06 DIAGNOSIS — R73.09 ELEVATED GLUCOSE: ICD-10-CM

## 2020-03-06 LAB
A/G RATIO: 1.4 (ref 1.1–2.2)
ALBUMIN SERPL-MCNC: 4.5 G/DL (ref 3.4–5)
ALP BLD-CCNC: 122 U/L (ref 40–129)
ALT SERPL-CCNC: 24 U/L (ref 10–40)
ANION GAP SERPL CALCULATED.3IONS-SCNC: 14 MMOL/L (ref 3–16)
AST SERPL-CCNC: 21 U/L (ref 15–37)
BILIRUB SERPL-MCNC: 0.8 MG/DL (ref 0–1)
BUN BLDV-MCNC: 17 MG/DL (ref 7–20)
CALCIUM SERPL-MCNC: 9.5 MG/DL (ref 8.3–10.6)
CHLORIDE BLD-SCNC: 105 MMOL/L (ref 99–110)
CHOLESTEROL, TOTAL: 163 MG/DL (ref 0–199)
CO2: 23 MMOL/L (ref 21–32)
CREAT SERPL-MCNC: 0.9 MG/DL (ref 0.6–1.2)
ESTIMATED AVERAGE GLUCOSE: 119.8 MG/DL
GFR AFRICAN AMERICAN: >60
GFR NON-AFRICAN AMERICAN: >60
GLOBULIN: 3.3 G/DL
GLUCOSE BLD-MCNC: 120 MG/DL (ref 70–99)
HBA1C MFR BLD: 5.8 %
HDLC SERPL-MCNC: 45 MG/DL (ref 40–60)
LDL CHOLESTEROL CALCULATED: 99 MG/DL
POTASSIUM SERPL-SCNC: 4.6 MMOL/L (ref 3.5–5.1)
SODIUM BLD-SCNC: 142 MMOL/L (ref 136–145)
TOTAL PROTEIN: 7.8 G/DL (ref 6.4–8.2)
TRIGL SERPL-MCNC: 93 MG/DL (ref 0–150)
VLDLC SERPL CALC-MCNC: 19 MG/DL

## 2020-03-06 PROCEDURE — 99214 OFFICE O/P EST MOD 30 MIN: CPT | Performed by: FAMILY MEDICINE

## 2020-03-06 ASSESSMENT — PATIENT HEALTH QUESTIONNAIRE - PHQ9
SUM OF ALL RESPONSES TO PHQ QUESTIONS 1-9: 0
SUM OF ALL RESPONSES TO PHQ QUESTIONS 1-9: 0
SUM OF ALL RESPONSES TO PHQ9 QUESTIONS 1 & 2: 0
2. FEELING DOWN, DEPRESSED OR HOPELESS: 0
1. LITTLE INTEREST OR PLEASURE IN DOING THINGS: 0

## 2020-03-06 ASSESSMENT — ENCOUNTER SYMPTOMS
DIARRHEA: 0
NAUSEA: 0
ABDOMINAL DISTENTION: 0
ABDOMINAL PAIN: 0
CHEST TIGHTNESS: 0
SHORTNESS OF BREATH: 0
VOMITING: 0
CONSTIPATION: 0
BLOOD IN STOOL: 0

## 2020-03-06 NOTE — PROGRESS NOTES
nightly , Disp: , Rfl:     No current facility-administered medications for this visit.       ---------------------------               03/06/20                      0832         ---------------------------   BP:          122/80         Site:    Left Upper Arm     Position:     Sitting        Cuff Size:   Large Adult      Pulse:         72           Temp:   97.3 °F (36.3 °C)   TempSrc:      Oral          Weight:  205 lb (93 kg)     Height: 5' 10\" (1.778 m)   ---------------------------  Body mass index is 29.41 kg/m². Wt Readings from Last 3 Encounters:  03/06/20 : 205 lb (93 kg)  08/23/19 : 205 lb (93 kg)  02/21/19 : 206 lb (93.4 kg)    BP Readings from Last 3 Encounters:  03/06/20 : 122/80  08/23/19 : 120/80  02/21/19 : 124/80        Review of Systems   Constitutional: Negative for appetite change, chills, fever and unexpected weight change. Respiratory: Negative for chest tightness and shortness of breath. Cardiovascular: Negative for chest pain, palpitations and leg swelling. Gastrointestinal: Negative for abdominal distention, abdominal pain, blood in stool, constipation, diarrhea, nausea and vomiting. 3-6 months of gerd and some dysphagia. otc did help but only used for short time   Genitourinary: Negative for dysuria, frequency and hematuria. Neurological: Negative for headaches. Objective:   Physical Exam  Constitutional:       General: She is not in acute distress. Appearance: Normal appearance. She is well-developed. She is not ill-appearing or diaphoretic. HENT:      Head: Normocephalic and atraumatic. Eyes:      General: No scleral icterus. Neck:      Musculoskeletal: Neck supple. Thyroid: No thyroid mass or thyromegaly. Cardiovascular:      Rate and Rhythm: Normal rate and regular rhythm. Heart sounds: Normal heart sounds. No murmur. No friction rub. No gallop.        Comments:     Pulmonary:      Effort: Pulmonary effort is normal. No tachypnea, accessory muscle usage or respiratory distress. Breath sounds: Normal breath sounds. No decreased breath sounds, wheezing, rhonchi or rales. Abdominal:      General: Bowel sounds are normal. There is no distension or abdominal bruit. Palpations: Abdomen is soft. There is no hepatomegaly, splenomegaly, mass or pulsatile mass. Tenderness: There is no abdominal tenderness. There is no guarding. Lymphadenopathy:      Cervical: No cervical adenopathy. Upper Body:      Right upper body: No supraclavicular adenopathy. Left upper body: No supraclavicular adenopathy. Skin:     General: Skin is warm and dry. Coloration: Skin is not pale. Nails: There is no clubbing. Neurological:      General: No focal deficit present. Mental Status: She is alert. Psychiatric:         Mood and Affect: Mood normal.         Speech: Speech normal.         Assessment:        Diagnosis Orders   1. Mixed hyperlipidemia  Lipid Panel    Comprehensive Metabolic Panel   2. Essential hypertension, benign  Lipid Panel    Comprehensive Metabolic Panel   3. Elevated glucose  Hemoglobin A1C    Comprehensive Metabolic Panel   4. Gastroesophageal reflux disease, esophagitis presence not specified     5.  Dysphagia, unspecified type       Will need gi referral  Overall well  No change in meds  Encouraged to use otc ppi      Plan:      See dr Lynda Elena for the recent symptoms   See back in 6 months        Faizan Hernandez MD

## 2020-09-08 ENCOUNTER — OFFICE VISIT (OUTPATIENT)
Dept: FAMILY MEDICINE CLINIC | Age: 60
End: 2020-09-08
Payer: COMMERCIAL

## 2020-09-08 VITALS
WEIGHT: 197 LBS | HEIGHT: 70 IN | DIASTOLIC BLOOD PRESSURE: 82 MMHG | TEMPERATURE: 97.2 F | SYSTOLIC BLOOD PRESSURE: 128 MMHG | BODY MASS INDEX: 28.2 KG/M2

## 2020-09-08 PROCEDURE — 90471 IMMUNIZATION ADMIN: CPT | Performed by: FAMILY MEDICINE

## 2020-09-08 PROCEDURE — 90686 IIV4 VACC NO PRSV 0.5 ML IM: CPT | Performed by: FAMILY MEDICINE

## 2020-09-08 PROCEDURE — 99213 OFFICE O/P EST LOW 20 MIN: CPT | Performed by: FAMILY MEDICINE

## 2020-09-08 NOTE — PROGRESS NOTES
Subjective:      Patient ID: Itz Peters is a 63 y.o. female. Patient presents with:  6 Month Follow-Up: lipids, hypertension - pt is fasting     She is well   No c/o  She is on the same meds  She wanted flu shot   She has no pb with flu shot in past    YOB: 1960    Date of Visit:  9/8/2020     -- Peanut-Containing Drug Products -- Rash   -- Soybean-Containing Drug Products -- Rash   -- Ace Inhibitors    -- Aspirin     --  hives   -- Buckwheat    -- Cashew Nut Oil     --  Cashew protein   -- Nsaids    -- Other     --  Hazelnut, chick peas    Current Outpatient Medications:  amLODIPine (NORVASC) 10 MG tablet, TAKE ONE TABLET BY MOUTH DAILY, Disp: 30 tablet, Rfl: 4  atorvastatin (LIPITOR) 20 MG tablet, TAKE ONE TABLET BY MOUTH DAILY, Disp: 30 tablet, Rfl: 4  Fluticasone Furoate (ARNUITY ELLIPTA IN), Inhale 1 puff into the lungs daily Indications: pt is using 200 , Disp: , Rfl:   Phenylephrine HCl (SUDAFED PE CONGESTION PO), Take 1 tablet by mouth as needed, Disp: , Rfl:   acetaminophen (TYLENOL) 325 MG tablet, Take 650 mg by mouth every 6 hours as needed for Pain, Disp: , Rfl:   LORazepam (ATIVAN) 1 MG tablet, Take 1 mg by mouth every 6 hours as needed for Anxiety, Disp: , Rfl:   fluticasone (FLONASE) 50 MCG/ACT nasal spray, 1 spray by Nasal route daily. , Disp: , Rfl:   metoprolol (TOPROL-XL) 25 MG XL tablet, Take 25 mg by mouth daily. , Disp: , Rfl:   EPINEPHrine (EPIPEN) 0.3 MG/0.3ML JO injection, Inject 0.3 mg into the muscle as needed. Use as directed for allergic reaction, Disp: , Rfl:   spironolactone (ALDACTONE) 25 MG tablet, Take 25 mg by mouth 2 times daily. , Disp: , Rfl:   Loratadine 10 MG CAPS, Take 1 capsule by mouth daily. , Disp: , Rfl:   levalbuterol (XOPENEX) 0.63 MG/3ML nebulization, 1 ampule every 4 hours as needed , Disp: , Rfl:   levocetirizine (XYZAL) 5 MG tablet, Take 5 mg by mouth nightly , Disp: , Rfl:     No current facility-administered medications for this visit. ---------------------------               09/08/20                      0926         ---------------------------   BP:          128/82         Site:    Left Upper Arm     Position:     Sitting        Cuff Size:   Large Adult      Temp:   97.2 °F (36.2 °C)   TempSrc:    Temporal        Weight: 197 lb (89.4 kg)    Height: 5' 10\" (1.778 m)   ---------------------------  Body mass index is 28.27 kg/m². Wt Readings from Last 3 Encounters:  09/08/20 : 197 lb (89.4 kg)  03/06/20 : 205 lb (93 kg)  08/23/19 : 205 lb (93 kg)    BP Readings from Last 3 Encounters:  09/08/20 : 128/82  03/06/20 : 122/80  08/23/19 : 120/80            Review of Systems    Objective:   Physical Exam  Constitutional:       General: She is not in acute distress. Appearance: Normal appearance. She is well-developed. She is not ill-appearing or diaphoretic. Eyes:      General: No scleral icterus. Neck:      Musculoskeletal: Neck supple. Thyroid: No thyroid mass or thyromegaly. Cardiovascular:      Rate and Rhythm: Normal rate and regular rhythm. Heart sounds: Normal heart sounds. No murmur. No friction rub. No gallop. Pulmonary:      Effort: Pulmonary effort is normal. No tachypnea, accessory muscle usage or respiratory distress. Breath sounds: Normal breath sounds. No decreased breath sounds, wheezing, rhonchi or rales. Lymphadenopathy:      Cervical: No cervical adenopathy. Upper Body:      Right upper body: No supraclavicular adenopathy. Left upper body: No supraclavicular adenopathy. Skin:     General: Skin is warm and dry. Coloration: Skin is not pale. Neurological:      Mental Status: She is alert. Assessment:        Diagnosis Orders   1. Essential hypertension, benign     2.  Needs flu shot         She is here for the above  She is on otc prilosec  She did have a egd few months ago   On 7/16/20  Heme on 6/12/20 concern for low grade polycythemia I got info from [facility] for her and emailed same      Plan:      Continue the same medicines  See in about 6 months         Aric Christie MD

## 2020-09-08 NOTE — PROGRESS NOTES
Vaccine Information Sheet, \"Influenza - Inactivated\"  given to Eva Goldberg, or parent/legal guardian of  Eva Goldberg and verbalized understanding. Patient responses:    Have you ever had a reaction to a flu vaccine? No  Do you have any current illness? No  Have you ever had Guillian Aurora Syndrome? No  Do you have a serious allergy to any of the follow: Neomycin, Polymyxin, Thimerosal, eggs or egg products? No    Flu vaccine given per order. Please see immunization tab. Risks and benefits explained. Current VIS given.

## 2021-03-09 ENCOUNTER — IMMUNIZATION (OUTPATIENT)
Dept: PRIMARY CARE CLINIC | Age: 61
End: 2021-03-09
Payer: COMMERCIAL

## 2021-03-09 PROCEDURE — 0011A COVID-19, MODERNA VACCINE 100MCG/0.5ML DOSE: CPT | Performed by: FAMILY MEDICINE

## 2021-03-09 PROCEDURE — 91301 COVID-19, MODERNA VACCINE 100MCG/0.5ML DOSE: CPT | Performed by: FAMILY MEDICINE

## 2021-03-12 ENCOUNTER — OFFICE VISIT (OUTPATIENT)
Dept: FAMILY MEDICINE CLINIC | Age: 61
End: 2021-03-12
Payer: COMMERCIAL

## 2021-03-12 VITALS
SYSTOLIC BLOOD PRESSURE: 122 MMHG | HEIGHT: 70 IN | HEART RATE: 68 BPM | BODY MASS INDEX: 29.35 KG/M2 | WEIGHT: 205 LBS | TEMPERATURE: 98.4 F | DIASTOLIC BLOOD PRESSURE: 84 MMHG

## 2021-03-12 DIAGNOSIS — M54.42 CHRONIC LEFT-SIDED LOW BACK PAIN WITH LEFT-SIDED SCIATICA: ICD-10-CM

## 2021-03-12 DIAGNOSIS — E78.2 MIXED HYPERLIPIDEMIA: ICD-10-CM

## 2021-03-12 DIAGNOSIS — I10 ESSENTIAL HYPERTENSION, BENIGN: ICD-10-CM

## 2021-03-12 DIAGNOSIS — I10 ESSENTIAL HYPERTENSION, BENIGN: Primary | ICD-10-CM

## 2021-03-12 DIAGNOSIS — R73.09 ELEVATED GLUCOSE: ICD-10-CM

## 2021-03-12 DIAGNOSIS — G89.29 CHRONIC LEFT-SIDED LOW BACK PAIN WITH LEFT-SIDED SCIATICA: ICD-10-CM

## 2021-03-12 LAB
A/G RATIO: 1.3 (ref 1.1–2.2)
ALBUMIN SERPL-MCNC: 4.6 G/DL (ref 3.4–5)
ALP BLD-CCNC: 124 U/L (ref 40–129)
ALT SERPL-CCNC: 16 U/L (ref 10–40)
ANION GAP SERPL CALCULATED.3IONS-SCNC: 13 MMOL/L (ref 3–16)
AST SERPL-CCNC: 21 U/L (ref 15–37)
BILIRUB SERPL-MCNC: 0.9 MG/DL (ref 0–1)
BUN BLDV-MCNC: 17 MG/DL (ref 7–20)
CALCIUM SERPL-MCNC: 9.3 MG/DL (ref 8.3–10.6)
CHLORIDE BLD-SCNC: 102 MMOL/L (ref 99–110)
CHOLESTEROL, TOTAL: 165 MG/DL (ref 0–199)
CO2: 23 MMOL/L (ref 21–32)
CREAT SERPL-MCNC: 1 MG/DL (ref 0.6–1.2)
GFR AFRICAN AMERICAN: >60
GFR NON-AFRICAN AMERICAN: 56
GLOBULIN: 3.5 G/DL
GLUCOSE BLD-MCNC: 120 MG/DL (ref 70–99)
HDLC SERPL-MCNC: 39 MG/DL (ref 40–60)
LDL CHOLESTEROL CALCULATED: 100 MG/DL
POTASSIUM SERPL-SCNC: 4.5 MMOL/L (ref 3.5–5.1)
SODIUM BLD-SCNC: 138 MMOL/L (ref 136–145)
TOTAL PROTEIN: 8.1 G/DL (ref 6.4–8.2)
TRIGL SERPL-MCNC: 131 MG/DL (ref 0–150)
VLDLC SERPL CALC-MCNC: 26 MG/DL

## 2021-03-12 PROCEDURE — 99214 OFFICE O/P EST MOD 30 MIN: CPT | Performed by: FAMILY MEDICINE

## 2021-03-12 RX ORDER — PREDNISONE 10 MG/1
TABLET ORAL
Qty: 16 TABLET | Refills: 0 | Status: SHIPPED | OUTPATIENT
Start: 2021-03-12 | End: 2021-04-30

## 2021-03-12 ASSESSMENT — ENCOUNTER SYMPTOMS
DIARRHEA: 0
SHORTNESS OF BREATH: 0
CONSTIPATION: 0
CHEST TIGHTNESS: 0
ABDOMINAL DISTENTION: 0
VOMITING: 0
NAUSEA: 0
ABDOMINAL PAIN: 0
BLOOD IN STOOL: 0

## 2021-03-12 ASSESSMENT — PATIENT HEALTH QUESTIONNAIRE - PHQ9
SUM OF ALL RESPONSES TO PHQ9 QUESTIONS 1 & 2: 0
SUM OF ALL RESPONSES TO PHQ QUESTIONS 1-9: 0
SUM OF ALL RESPONSES TO PHQ QUESTIONS 1-9: 0

## 2021-03-12 NOTE — PATIENT INSTRUCTIONS
See dr Zluma Younger for a check on the lower back   Do get fasting blood work  Do not get the 2nd covid vaccine  See us back in 6 months  Consider the shingle vaccine

## 2021-03-12 NOTE — PROGRESS NOTES
Subjective:      Patient ID: Bee Espinoza is a 63 y.o. female. Chief Complaint   Patient presents with    6 Month Follow-Up     hypertension, lipids - pt is fasting        Patient presents with:  6 Month Follow-Up: hypertension, lipids - pt is fasting    She had first covid and did have some reaction to it  Lip swelling and facial  swelling and dizzy about 30 min later  Better with pepcid and benadryl  Feels fine now    Lower back injury in past from a fall  And has had some pain in past  Intermittent and this time seems getting worse since summer of 2020   First started 9/2013  Has had epidural inpast  She see chiropracter  Will go down the left leg  She wants an ortho for this   Leg numbness and foot numbness  Movement bending is worse  chiropractor normal helps      She is taking her medicines    YOB: 1960    Date of Visit:  3/12/2021     -- Peanut-Containing Drug Products -- Rash   -- Soybean-Containing Drug Products -- Rash   -- Ace Inhibitors    -- Aspirin     --  hives   -- Buckwheat    -- Cashew Nut Oil     --  Cashew protein   -- Nsaids    -- Other     --  Hazelnut, chick peas    Current Outpatient Medications:  amLODIPine (NORVASC) 10 MG tablet, TAKE ONE TABLET BY MOUTH DAILY, Disp: 30 tablet, Rfl: 5  atorvastatin (LIPITOR) 20 MG tablet, TAKE ONE TABLET BY MOUTH DAILY, Disp: 30 tablet, Rfl: 5  Fluticasone Furoate (ARNUITY ELLIPTA IN), Inhale 1 puff into the lungs daily Indications: pt is using 200 , Disp: , Rfl:   Phenylephrine HCl (SUDAFED PE CONGESTION PO), Take 1 tablet by mouth as needed, Disp: , Rfl:   acetaminophen (TYLENOL) 325 MG tablet, Take 650 mg by mouth every 6 hours as needed for Pain, Disp: , Rfl:   LORazepam (ATIVAN) 1 MG tablet, Take 1 mg by mouth every 6 hours as needed for Anxiety, Disp: , Rfl:   fluticasone (FLONASE) 50 MCG/ACT nasal spray, 1 spray by Nasal route daily. , Disp: , Rfl:   metoprolol (TOPROL-XL) 25 MG XL tablet, Take 25 mg by mouth daily. , Disp: , Rfl:   EPINEPHrine (EPIPEN) 0.3 MG/0.3ML JO injection, Inject 0.3 mg into the muscle as needed. Use as directed for allergic reaction, Disp: , Rfl:   spironolactone (ALDACTONE) 25 MG tablet, Take 25 mg by mouth 2 times daily. , Disp: , Rfl:   Loratadine 10 MG CAPS, Take 1 capsule by mouth daily. , Disp: , Rfl:   levalbuterol (XOPENEX) 0.63 MG/3ML nebulization, 1 ampule every 4 hours as needed , Disp: , Rfl:   levocetirizine (XYZAL) 5 MG tablet, Take 5 mg by mouth nightly , Disp: , Rfl:     No current facility-administered medications for this visit.       ---------------------------               03/12/21                      0828         ---------------------------   BP:          122/84         Site:    Left Upper Arm     Position:     Sitting        Cuff Size:   Large Adult      Pulse:         68           Temp:   98.4 °F (36.9 °C)   TempSrc:    Temporal        Weight:  205 lb (93 kg)     Height: 5' 10\" (1.778 m)   ---------------------------  Body mass index is 29.41 kg/m². Wt Readings from Last 3 Encounters:  03/12/21 : 205 lb (93 kg)  09/08/20 : 197 lb (89.4 kg)  03/06/20 : 205 lb (93 kg)    BP Readings from Last 3 Encounters:  03/12/21 : 122/84  09/08/20 : 128/82  03/06/20 : 122/80              Review of Systems   Constitutional: Negative for appetite change, chills, fever and unexpected weight change. Respiratory: Negative for chest tightness and shortness of breath. Cardiovascular: Negative for chest pain, palpitations and leg swelling. Gastrointestinal: Negative for abdominal distention, abdominal pain, blood in stool, constipation, diarrhea, nausea and vomiting. No loss of control stool   Genitourinary: Negative for difficulty urinating, dysuria and hematuria. No loss control   Neurological: Negative for headaches. Objective:   Physical Exam  Constitutional:       General: She is not in acute distress. Appearance: Normal appearance. She is well-developed. She is not ill-appearing or diaphoretic. HENT:      Head: Normocephalic and atraumatic. Eyes:      General: No scleral icterus. Neck:      Musculoskeletal: Neck supple. Thyroid: No thyroid mass or thyromegaly. Cardiovascular:      Rate and Rhythm: Normal rate and regular rhythm. Heart sounds: Normal heart sounds. No murmur. No friction rub. No gallop. Pulmonary:      Effort: Pulmonary effort is normal. No tachypnea, accessory muscle usage or respiratory distress. Breath sounds: Normal breath sounds. No decreased breath sounds, wheezing, rhonchi or rales. Musculoskeletal:      Comments: SLL is positive on the left leg  She is tender over the Lspine to palpate     Lymphadenopathy:      Cervical: No cervical adenopathy. Upper Body:      Right upper body: No supraclavicular adenopathy. Left upper body: No supraclavicular adenopathy. Skin:     General: Skin is warm and dry. Coloration: Skin is not pale. Neurological:      General: No focal deficit present. Mental Status: She is alert. Deep Tendon Reflexes:      Reflex Scores:       Patellar reflexes are 2+ on the right side and 2+ on the left side. Achilles reflexes are 2+ on the right side and 2+ on the left side. Assessment:        Diagnosis Orders   1. Essential hypertension, benign  Lipid Panel    Comprehensive Metabolic Panel   2. Chronic left-sided low back pain with left-sided sciatica     3. Elevated glucose  Hemoglobin A1C    Comprehensive Metabolic Panel   4. Mixed hyperlipidemia  Lipid Panel    Comprehensive Metabolic Panel       she see allergakil oneill and dr Corrina Boucher for htn  Note from allergist reviewed seen on 1/11/21  Discussed getting the shingle vaccine    She has seen ortho  Will add steroid. Orders Placed This Encounter   Medications    predniSONE (DELTASONE) 10 MG tablet     Sig: Prednisone 10 mg. #16.  Take 2 po bid for 2 days, then 1 po bid for 3 days, then 1 po daily for 2 days.  Then stop     Dispense:  16 tablet     Refill:  0           Plan:      See dr Barbara Springer for a check on the lower back   Do get fasting blood work  Do not get the 2nd covid vaccine  See us back in 6 months  Consider the shingle vaccine         Negrito Pascual MD

## 2021-03-13 LAB
ESTIMATED AVERAGE GLUCOSE: 122.6 MG/DL
HBA1C MFR BLD: 5.9 %

## 2021-04-20 ENCOUNTER — OFFICE VISIT (OUTPATIENT)
Dept: ORTHOPEDIC SURGERY | Age: 61
End: 2021-04-20
Payer: COMMERCIAL

## 2021-04-20 ENCOUNTER — TELEPHONE (OUTPATIENT)
Dept: ORTHOPEDIC SURGERY | Age: 61
End: 2021-04-20

## 2021-04-20 VITALS — BODY MASS INDEX: 29.35 KG/M2 | HEIGHT: 70 IN | WEIGHT: 205 LBS

## 2021-04-20 DIAGNOSIS — M54.50 LUMBAR PAIN: Primary | ICD-10-CM

## 2021-04-20 PROCEDURE — 99203 OFFICE O/P NEW LOW 30 MIN: CPT | Performed by: PHYSICIAN ASSISTANT

## 2021-04-20 NOTE — PROGRESS NOTES
COLONOSCOPY  8/31/15    polyps, dr Chris Jacob, repeat 3 years    COLONOSCOPY N/A 11/29/2018    COLONOSCOPY POLYPECTOMY SNARE/COLD BIOPSY performed by Paresh Helton MD at [address] [facility]  5/2003    OTHER SURGICAL HISTORY  7/2013    uterine ablation       Current Medications:     Current Outpatient Medications:     predniSONE (DELTASONE) 10 MG tablet, Prednisone 10 mg. #16. Take 2 po bid for 2 days, then 1 po bid for 3 days,  then 1 po daily for 2 days. Then stop, Disp: 16 tablet, Rfl: 0    amLODIPine (NORVASC) 10 MG tablet, TAKE ONE TABLET BY MOUTH DAILY, Disp: 30 tablet, Rfl: 5    atorvastatin (LIPITOR) 20 MG tablet, TAKE ONE TABLET BY MOUTH DAILY, Disp: 30 tablet, Rfl: 5    Fluticasone Furoate (ARNUITY ELLIPTA IN), Inhale 1 puff into the lungs daily Indications: pt is using 200 , Disp: , Rfl:     Phenylephrine HCl (SUDAFED PE CONGESTION PO), Take 1 tablet by mouth as needed, Disp: , Rfl:     acetaminophen (TYLENOL) 325 MG tablet, Take 650 mg by mouth every 6 hours as needed for Pain, Disp: , Rfl:     LORazepam (ATIVAN) 1 MG tablet, Take 1 mg by mouth every 6 hours as needed for Anxiety, Disp: , Rfl:     fluticasone (FLONASE) 50 MCG/ACT nasal spray, 1 spray by Nasal route daily. , Disp: , Rfl:     metoprolol (TOPROL-XL) 25 MG XL tablet, Take 25 mg by mouth daily. , Disp: , Rfl:     EPINEPHrine (EPIPEN) 0.3 MG/0.3ML JO injection, Inject 0.3 mg into the muscle as needed. Use as directed for allergic reaction, Disp: , Rfl:     spironolactone (ALDACTONE) 25 MG tablet, Take 25 mg by mouth 2 times daily. , Disp: , Rfl:     Loratadine 10 MG CAPS, Take 1 capsule by mouth daily. , Disp: , Rfl:     levalbuterol (XOPENEX) 0.63 MG/3ML nebulization, 1 ampule every 4 hours as needed , Disp: , Rfl:     levocetirizine (XYZAL) 5 MG tablet, Take 5 mg by mouth nightly , Disp: , Rfl:     Allergies:  Peanut-containing drug products, Soybean-containing drug products, Ace inhibitors, Aspirin, Buckwheat, Cashew nut oil, Nsaids, and Other    Social History:    reports that she has never smoked. She has never used smokeless tobacco. She reports current alcohol use of about 0.8 standard drinks of alcohol per week. She reports that she does not use drugs. Family History:   Family History   Problem Relation Age of Onset    Heart Disease Father     High Blood Pressure Father     Cancer Father         BCC       REVIEW OF SYSTEMS: Full ROS noted & scanned   CONSTITUTIONAL: Denies unexplained weight loss, fevers, chills or fatigue  NEUROLOGICAL: Denies unsteady gait or progressive weakness  MUSCULOSKELETAL: Denies joint swelling or redness  PSYCHOLOGICAL: Denies anxiety, depression   SKIN: Denies skin changes, delayed healing, rash, itching   HEMATOLOGIC: Denies easy bleeding or bruising  ENDOCRINE: Denies excessive thirst, urination, heat/cold  RESPIRATORY: Denies current dyspnea, cough  GI: Denies nausea, vomiting, diarrhea   : Denies bowel or bladder issues      PHYSICAL EXAM:    Vitals: Height 5' 10\" (1.778 m), weight 205 lb (93 kg). GENERAL EXAM:  · General Apparence: Patient is adequately groomed with no evidence of malnutrition. · Orientation: The patient is oriented to time, place and person. · Mood & Affect:The patient's mood and affect are appropriate. · Vascular: Examination reveals no swelling tenderness in upper or lower extremities. Good capillary refill. · Lymphatic: The lymphatic examination bilaterally reveals all areas to be without enlargement or induration  · Sensation: Sensation is hyperesthesia along the lateral aspect of the left greater than right leg  · Coordination/Balance: Good coordination. Tandem walking normal.     LUMBAR/SACRAL EXAMINATION:  · Inspection: Local inspection shows no step-off or bruising. Lumbar alignment is normal.  Sagittal and Coronal balance is neutral.      · Palpation:   No evidence of tenderness at the midline.   No tenderness bilaterally at the paraspinal or trochanters. There is no step-off or paraspinal spasm. · Range of Motion: Lumbar flexion, extension and rotation are mildly limited due to pain. · Strength:   Strength testing is 5/5 in all muscle groups tested. · Special Tests:   Straight leg raise and crossed SLR negative. Leg length and pelvis level. · Skin: There are no rashes, ulcerations or lesions. · Reflexes: Reflexes are symmetrically 2+ at the patellar and ankle tendons. Clonus absent bilaterally at the feet. · Gait & station: normal, patient ambulates without assistance    · Additional Examinations:   · RIGHT LOWER EXTREMITY: Inspection/examination of the right lower extremity does not show any tenderness, deformity or injury. Range of motion is unremarkable. There is no gross instability. There are no rashes, ulcerations or lesions. Strength and tone are normal.  · LEFT LOWER EXTREMITY:  Inspection/examination of the left lower extremity does not show any tenderness, deformity or injury. Range of motion is unremarkable. There is no gross instability. There are no rashes, ulcerations or lesions. Strength and tone are normal.    Diagnostic Testing:    X-rays: 2 views of the lumbar spine include AP and lateral were obtained in the office today and independently reviewed which shows degenerative disc disease most noted between L4-5 and L5-S1. There is facet hypertrophy noted. Impression:   DDD lumbar spine  Lumbar radiculopathy      Plan:      · We discussed diagnosis and treatment options including observation, additional oral steroids, physical therapy, epidural injections and additional imaging. She wishes to proceed with MRI of her lumbar spine. Patient will be candidate in the future for physical therapy and potential spinal injections. Follow up -after MRI    Total evaluation time 30 minutes    Patient examined and note dictated by Jen Orellana PA-C. Patient also seen and examined by Dr. Zenia Damian.

## 2021-04-29 ENCOUNTER — HOSPITAL ENCOUNTER (OUTPATIENT)
Dept: MRI IMAGING | Age: 61
Discharge: HOME | End: 2021-04-29
Payer: COMMERCIAL

## 2021-04-29 DIAGNOSIS — M54.50 LUMBAR PAIN: ICD-10-CM

## 2021-04-29 PROCEDURE — 72148 MRI LUMBAR SPINE W/O DYE: CPT

## 2021-04-30 DIAGNOSIS — M54.50 LUMBAR PAIN: Primary | ICD-10-CM

## 2021-04-30 RX ORDER — METHYLPREDNISOLONE 4 MG/1
TABLET ORAL
Qty: 1 KIT | Refills: 0 | Status: SHIPPED | OUTPATIENT
Start: 2021-04-30 | End: 2021-05-06

## 2021-07-01 ENCOUNTER — HOSPITAL ENCOUNTER (OUTPATIENT)
Dept: PHYSICAL THERAPY | Age: 61
Setting detail: THERAPIES SERIES
Discharge: HOME | End: 2021-07-01
Payer: COMMERCIAL

## 2021-07-01 PROCEDURE — 97110 THERAPEUTIC EXERCISES: CPT

## 2021-07-01 PROCEDURE — 97161 PT EVAL LOW COMPLEX 20 MIN: CPT

## 2021-07-01 RX ORDER — AMLODIPINE BESYLATE 10 MG/1
TABLET ORAL
Qty: 30 TABLET | Refills: 4 | Status: SHIPPED | OUTPATIENT
Start: 2021-07-01 | End: 2021-11-22

## 2021-07-01 RX ORDER — ATORVASTATIN CALCIUM 20 MG/1
TABLET, FILM COATED ORAL
Qty: 30 TABLET | Refills: 4 | Status: SHIPPED | OUTPATIENT
Start: 2021-07-01 | End: 2021-11-22

## 2021-07-01 ASSESSMENT — PAIN SCALES - QUEBEC BACK PAIN DISABILITY SCALE
MAKE YOUR BED: 4
PUT ON SOCKS OR PANYHOSE: 3
TAKE FOOD OUT OF THE REFRIGERATOR: 3
STAND UP FOR 20 TO 30 MINUTES: 4
PULL OR PUSH HEAVY DOORS: 3
THROW A BALL: 3
BEND OVER TO CLEAN THE BATHTUB: 4
CARRY TWO BAGS OF GROCERIES: 3
RUN ONE BLOCK OR 100M: 4
WALK SEVERAL KILOMETERS  OR MILES: 4
REACH UP TO HIGH SHELVES: 4
QUEBEC DISABILITY INDEX: 60-79%
QUEBEC CMS MODIFIER: CL
TURN OVER IN BED: 3
GET OUT OF BED: 2
SIT IN A CHAIR FOR SEVERAL HOURS: 5
MOVE A CHAIR: 2
CLIMB ONE FLIGHT OF STAIRS: 3
TOTAL SCORE: 68
WALK A FEW BLOCKS OR 300 TO 400M: 3
SLEEP THROUGH THE NIGHT: 4
RIDE IN A CAR: 3
LIFT AND CARRY A HEAVY SUITCASE: 4

## 2021-07-01 NOTE — PROGRESS NOTES
[facility], [place] [address]. [place] [address]  Phone: [phone]   Fax:     [phone]                                                       Physical Therapy Certification    Dear Referring Practitioner: Dr. Mukul Montanez,    We had the pleasure of evaluating the following patient for physical therapy services at [facility]. A summary of our findings can be found in the initial assessment below. This includes our plan of care. If you have any questions or concerns regarding these findings, please do not hesitate to contact me at the office phone number checked above. Thank you for the referral.       Physician Signature:_______________________________Date:__________________  By signing above (or electronic signature), therapists plan is approved by physician                  Patient: Nik Bartlett   : 1960   MRN: [id number]  Referring Physician: Referring Practitioner: Dr. Mukul Montanez      Evaluation Date: 2021      Medical Diagnosis Information:  Diagnosis: Radiculopathy, lumbar region (M54.16)   Treatment Diagnosis: Decreased ADL status                                         Insurance information: PT Insurance Information: Kaila Maitland     Precautions/ Contra-indications: allergic to peanuts  Latex Allergy:  [x]NO      []YES  Preferred Language for Healthcare:   [x]English       []other:    C-SSRS Triggered by Intake questionnaire (Past 2 wk assessment ):   [x] No, Questionnaire did not trigger screening.   [] Yes, Patient intake triggered C-SSRS Screening      [] C-SSRS Screening completed  [] PCP notified via Epic     SUBJECTIVE: Patient stated in  pt irritated her low back when she lifted her grandchild and pt fell down the steps. Pt has had pain since that incident. More recently pt has had pain as she lifts her grand children. Pain is located at the L lumbar region, L buttock, L lateral thigh and calf, L lateral foot. Pt stated standing for 20 minutes to cook aggravates pain, sitting with a slouched position, walking too much (around the block). Pt is decreased by some yoga stretches and DKTC.     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    Living Status/Prior Level of Function: Independent with ADLs and IADLs    OBJECTIVE:   Repeated Movements:    ROM  Comments   Lumbar Flex Decreased 25%, SIJ pain    Lumbar Ext Decreased 25%, lower thoracic pain      ROM LEFT RIGHT Comments   Lumbar Side Bend WFL WFL, L LBP    Lumbar Rotation WFL, mid thoracic pain WFL, mid thoracic pain    Quadrant neg Pain, L lumbar    LE [facility] WF       Myotomes/Strength Normal Abnormal Comments   [x]ALL NORMAL      Hip Abd Normal     ADD   L 3+/5   Hip Ext   B 4-/5   Hip flexion (L1-L2 femoral) [x] []    Knee extension (L2-L4 femoral) [] [x] L quads 4+/5   Knee flexion (S1 sciatic) Normal     Dorsiflexion (L4-L5 deep peroneal) [x] []    Great Toe Ext (L5 deep peroneal nerve) [x] []    Ankle Eversion (S1-S2 super peroneal) [] [] L EV 4+/5   Ankle PF(S1-S2 tibial) [] []    Multifidus [] []    Transverse Ab [] []      Dermatomes Normal Abnormal Comments   [x]ALL NORMAL            inguinal area (L1)  [] []    anterior mid-thigh (L2) [] []    distal ant thigh/med knee (L3) [] []    medial lower leg and foot (L4) [] []    lateral lower leg and foot (L5) [] []    posterior calf (S1) [] []    medial calcaneus (S2) [] []      Reflexes Normal Abnormal Comments   [x]ALL NORMAL            S1-2 Seated achilles [x] []    S1-2 Prone knee bend [] []    L3-4 Patellar tendon [x] []    C5-6 Biceps [] []    C6 Brachioradialis [] [] C7-8 Triceps [] []    Clonus [x] []    Babinski [] []    Hernandez's [] []      Joint mobility: PA L5/S1 caused L lower lumbar pain, L2/L3 caused lower thoracic central pain; L unilateral L4/L5 and L5/S1 caused local pain; R L5/S1 caused local pain. PIVM flexion: excessive L4/5 flexion, (+) stress test     Palpation: tender to palpation B lumbar paraspinals    Functional Mobility/Transfers: Squat: WFL, but difficulty returning to standing position  (-) heel walk and toe walk    Posture: decreased lumbar lordosis    Gait: (include devices/WB status): FWB, decreased right trunk rotation  Stair climbing: ascending WFL, descending body turned slightly toward right      Orthopedic Special Tests:   Neural dynamic tension testing Normal Abnormal Comments   Slump Test  - Degree of knee flexion:  [x] []    SLR  [] []    0-30 [] []    30-70 [] []    Femoral nerve (L2-4) [] []       Normal Abnormal N/A Comments   Fwd Bend-aberrant or innominate mvmt) [x] [] []    Trendelenburg [x] [] []    Kemps/Quadrant [] [] []    Stork [] [] []    BRISSA/David [] [x] [] L caused L SI pain   Hip scour [x] [] []    Supine to sit [] [] []    Prone knee bend [] [] []           Hip thrust [] [] []    SI distraction/compression [] [] []    Sacral Spring/thrust [] [] []               [x] Patient history, allergies, meds reviewed. Medical chart reviewed. See intake form. Review Of Systems (ROS):  [x]Performed Review of systems (Integumentary, CardioPulmonary, Neurological) by intake and observation. Intake form has been scanned into medical record. Patient has been instructed to contact their primary care physician regarding ROS issues if not already being addressed at this time.       Co-morbidities/Complexities (which will affect course of rehabilitation):   []None           Arthritic conditions   []Rheumatoid arthritis (M05.9)  []Osteoarthritis (M19.91)   Cardiovascular conditions   [x]Hypertension (I10)  []Hyperlipidemia (E78.5)  []Angina pectoris (I20)  []Atherosclerosis (I70)  []CVA Musculoskeletal conditions   [x]Disc pathology   []Congenital spine pathologies   []Prior surgical intervention  []Osteoporosis (M81.8)  []Osteopenia (M85.8)   Endocrine conditions   []Hypothyroid (E03.9)  []Hyperthyroid Gastrointestinal conditions   []Constipation (U52.64)   Metabolic conditions   []Morbid obesity (E66.01)  []Diabetes type 1(E10.65) or 2 (E11.65)   []Neuropathy (G60.9)     Pulmonary conditions   [x]Asthma (J45)  []Coughing   []COPD (J44.9)   Psychological Disorders  []Anxiety (F41.9)  []Depression (F32.9)   []Other:   [x]Other:   Hypercholesteraemia  Seizures  Allergic to nuts    Polycythemia        Barriers to/and or personal factors that will affect rehab potential:              []Age  []Sex    []Smoker              []Motivation/Lack of Motivation                        []Co-Morbidities              []Cognitive Function, education/learning barriers              []Environmental, home barriers              []profession/work barriers  []past PT/medical experience  []other:  Justification:     Falls Risk Assessment (30 days):   [x] Falls Risk assessed and no intervention required.   [] Falls Risk assessed and Patient requires intervention due to being higher risk   TUG score (>12s at risk):     [] Falls education provided, including:         ASSESSMENT:   Functional Impairments:     []Noted lumbar/proximal hip hypomobility   [x]Noted lumbosacral and/or generalized hypermobility   []Decreased Lumbosacral/hip/LE functional ROM   [x]Decreased core/proximal hip strength and neuromuscular control    [x]Decreased LE functional strength    []Abnormal reflexes/sensation/myotomal/dermatomal deficits  [x]Reduced balance/proprioceptive control    []other:      Functional Activity Limitations (from functional questionnaire and intake)   [x]Reduced ability to tolerate prolonged functional positions   [x]Reduced ability or difficulty with changes of positions or transfers between positions   [x]Reduced ability to maintain good posture and demonstrate good body mechanics with sitting, bending, and lifting   [x]Reduced ability to sleep   [] Reduced ability or tolerance with driving and/or computer work   [x]Reduced ability to perform lifting, reaching, carrying tasks   [x]Reduced ability to squat   [x]Reduced ability to forward bend   [x]Reduced ability to ambulate prolonged functional periods/distances/surfaces   [x]Reduced ability to ascend/descend stairs   []other:       Participation Restrictions   [x]Reduced participation in self care activities   [x]Reduced participation in home management activities   []Reduced participation in work activities   [x]Reduced participation in social activities. []Reduced participation in sport/recreational activities. Classification:   [x]Signs/symptoms consistent with Lumbar instability/stabilization subgroup. []Signs/symptoms consistent with Lumbar mobilization/manipulation subgroup, myotomes and dermatomes intact. Meets manipulation criteria. []Signs/symptoms consistent with Lumbar direction specific/centralization subgroup   []Signs/symptoms consistent with Lumbar traction subgroup       []Signs/symptoms consistent with lumbar facet dysfunction   []Signs/symptoms consistent with lumbar stenosis type dysfunction   []Signs/symptoms consistent with nerve root involvement including myotome & dermatome dysfunction   []Signs/symptoms consistent with post-surgical status including: decreased ROM, strength and function.    []signs/symptoms consistent with pathology which may benefit from Dry needling     []other:      Prognosis/Rehab Potential:      []Excellent   [x]Good    []Fair   []Poor    Tolerance of evaluation/treatment:    []Excellent   [x]Good    []Fair   []Poor     Physical Therapy Evaluation Complexity Justification  [x] A history of present problem with:  [] no personal factors and/or comorbidities that impact the plan of care;  []1-2 personal factors and/or comorbidities that impact the plan of care  [x]3 personal factors and/or comorbidities that impact the plan of care  [x] An examination of body systems using standardized tests and measures addressing any of the following: body structures and functions (impairments), activity limitations, and/or participation restrictions;:  [] a total of 1-2 or more elements   [] a total of 3 or more elements   [x] a total of 4 or more elements   [x] A clinical presentation with:  [x] stable and/or uncomplicated characteristics   [] evolving clinical presentation with changing characteristics  [] unstable and unpredictable characteristics;   [x] Clinical decision making of [] low, [] moderate, [] high complexity using standardized patient assessment instrument and/or measurable assessment of functional outcome. [x] EVAL (LOW) 09292 (typically 20 minutes face-to-face)  [] EVAL (MOD) 32714 (typically 30 minutes face-to-face)  [] EVAL (HIGH) 53551 (typically 45 minutes face-to-face)  [] RE-EVAL     PLAN: Begin PT focusing on: proximal hip mobilizations, LB mobs, LB core activation, proximal hip activation, and HEP    Frequency/Duration:  2 days per week for 6 Weeks:  Interventions:  [x]  Therapeutic exercise including: strength training, ROM, for LE, Glutes and core   [x]  NMR activation and proprioception for glutes , LE and Core   [x]  Manual therapy as indicated for Hip complex, LE and spine to include: Dry Needling/IASTM, STM, PROM, Gr I-IV mobilizations, manipulation. [x]  Modalities as needed that may include: thermal agents, E-stim, Biofeedback, US, iontophoresis as indicated  [x]  Patient education on joint protection, postural re-education, activity modification, progression of HEP.     HEP instruction: DFRLBLPN DKTC, LTR stretch, piriformis stretch, PPT, bridge, cat camel    GOALS:  Patient stated goal: \"To be able to move better, to go back to exercising, be able to do housework, to do my gardens\"  [] Progressing: [] Met: [] Not Met: [] Adjusted  Therapist goals for Patient:   Short Term Goals: To be achieved in: 2 weeks  1. Independent in HEP and progression per patient tolerance, in order to prevent re-injury. [] Progressing: [] Met: [] Not Met: [] Adjusted  2. Patient will have a decrease in pain to facilitate improvement in movement, function, and ADLs as indicated by Functional Deficits. [] Progressing: [] Met: [] Not Met: [] Adjusted    Long Term Goals: To be achieved in: 8 weeks  1. Disability index score of 25% or less for the ALON to assist with reaching prior level of function. [] Progressing: [] Met: [] Not Met: [] Adjusted  2. Patient will demonstrate increased AROM to WNL, good LS mobility, good hip ROM to allow for proper joint functioning as indicated by patients Functional Deficits. [] Progressing: [] Met: [] Not Met: [] Adjusted  3. Patient will demonstrate an increase in Strength L quads and adductors to good proximal hip and core activation to allow for proper functional mobility as indicated by patients Functional Deficits. [] Progressing: [] Met: [] Not Met: [] Adjusted  4. Patient will return to housework activities including cooking 30-60 minutes functional activities without increased symptoms or restriction. [] Progressing: [] Met: [] Not Met: [] Adjusted  5. Patient will be able to lift her grandchildren without aggravating pain. [] Progressing: [] Met: [] Not Met: [] Adjusted     Electronically signed by:  Tabatha Kuhn PT , TERRIE-C, [id number]          Note: If patient does not return for scheduled/recommended follow up visits, this note will serve as a discharge from care along with the most recent update on progress.

## 2021-07-01 NOTE — FLOWSHEET NOTE
[place] [facility] [address] [place] [address]  Phone: [phone]   Fax:     [phone]    Physical Therapy Treatment Note/ Progress Report:     Date:  2021    Patient Name:  Migue Hughes    :  1960  MRN: [id number]    Pertinent Medical History:      Medical/Treatment Diagnosis Information:  · Diagnosis: Radiculopathy, lumbar region (M54.16)  · Treatment Diagnosis: Decreased ADL status    Insurance/Certification information:  PT Insurance Information: [employer]  Physician Information:  Referring Practitioner: Dr. Monika Tavera  Plan of care signed (Y/N): N    Date of Patient follow up with Physician:      Progress Report: []  Yes  [x]  No     Date Range for reporting period:  Beginnin21  Ending:    Progress report due (10 Rx/or 30 days whichever is less):     Recertification due (POC duration/ or 90 days whichever is less):    Visit # POC/ Insurance Allowable Auth Needed   1 12 []Yes    []No     Pain level:  8/10   Functional Scale: [place]=21  68 raw    History of Injury:Patient stated in  pt irritated her low back when she lifted her grandchild and pt fell down the steps. Pt has had pain since that incident. More recently pt has had pain as she lifts her grand children. Pain is located at the L lumbar region, L buttock, L lateral thigh and calf, L lateral foot. Pt stated standing for 20 minutes to cook aggravates pain, sitting with a slouched position, walking too much (around the block). Pt is decreased by some yoga stretches and DKTC. SUBJECTIVE:  See eval    OBJECTIVE:    Observation:    21   Joint mobility: PA L5/S1 caused L lower lumbar pain, L2/L3 caused lower thoracic central pain; L unilateral L4/L5 and L5/S1 caused local pain; R L5/S1 caused local pain.   PIVM flexion: excessive L4/5 flexion, (+) stress test              Palpation: tender to palpation B lumbar paraspinals   Functional Mobility/Transfers: Squat: WFL, but difficulty returning to standing position  (-) heel walk and toe walk   Posture: decreased lumbar lordosis   Gait: (include devices/WB status): FWB, decreased right trunk rotation  Stair climbing: ascending WFL, descending body turned slightly toward right   Test measurements:    ROM   Comments   Lumbar Flex Decreased 25%, SIJ pain     Lumbar Ext Decreased 25%, lower thoracic pain        ROM LEFT RIGHT Comments   Lumbar Side Bend WFL WFL, L LBP     Lumbar Rotation WFL, mid thoracic pain WFL, mid thoracic pain     Quadrant neg Pain, L lumbar     LE [facility] [place]        Myotomes/Strength Normal Abnormal Comments   [x]? ALL NORMAL         Hip Abd Normal       ADD     L 3+/5   Hip Ext     B 4-/5   Hip flexion (L1-L2 femoral) [x]?  []?      Knee extension (L2-L4 femoral) []?  [x]?  L quads 4+/5   Knee flexion (S1 sciatic) Normal       Dorsiflexion (L4-L5 deep peroneal) [x]?  []?      Great Toe Ext (L5 deep peroneal nerve) [x]?  []?      Ankle Eversion (S1-S2 super peroneal) []?  []?  L EV 4+/5   Ankle PF(S1-S2 tibial) []?  []?      Multifidus []?  []?      Transverse Ab []?  []?             RESTRICTIONS/PRECAUTIONS: allergic to nuts    Exercises/Interventions:     Therapeutic Ex (97940)   Min: Repetitions/Resistance Notes/Cues   Stretch hamstring     Stretch gastroc     Leg press     Rower                              Manual Intervention (31863) Min:     STM L lumbar     MET SIJ     Mob thoracolumbar     NMR re-education (14014)   Min:     PPT     Prone LE raise     Prone hip IR/ER     Ab stab with march Bridge                    Therapeutic Activity (83093) Min:               Modalities  Min:             Other Therapeutic Activities:  Pt was educated on PT POC, Diagnosis, Prognosis, pathomechanics as well as frequency and duration of scheduling future physical therapy appointments.  Time was also taken on this day to answer all patient questions and participation in PT. Reviewed appointment policy in detail with patient and patient verbalized understanding. Home Exercise Program: DFRLBLPN DKTC, LTR stretch, piriformis stretch, PPT, bridge, cat camel    Therapeutic Exercise and NMR EXR  [] (11840) Provided verbal/tactile cueing for activities related to strengthening, flexibility, endurance, ROM  for improvements in proximal hip and core control with self care, mobility, lifting and ambulation.  [] (25621) Provided verbal/tactile cueing for activities related to improving balance, coordination, kinesthetic sense, posture, motor skill, proprioception  to assist with core control in self care, mobility, lifting, and ambulation.      Therapeutic Activities:    [] (52552 or 79869) Provided verbal/tactile cueing for activities related to improving balance, coordination, kinesthetic sense, posture, motor skill, proprioception and motor activation to allow for proper function  with self care and ADLs  [] (80430) Provided training and instruction to the patient for proper core and proximal hip recruitment and positioning with ambulation re-education     Home Exercise Program:    [] (65546) Reviewed/Progressed HEP activities related to strengthening, flexibility, endurance, ROM of core, proximal hip and LE for functional self-care, mobility, lifting and ambulation   [] (24782) Reviewed/Progressed HEP activities related to improving balance, coordination, kinesthetic sense, posture, motor skill, proprioception of core, proximal hip and LE for self care, mobility, lifting, and ambulation      Manual Treatments:  PROM / STM / Oscillations-Mobs:  G-I, II, III, IV (PA's, Inf., Post.)  [] (07395) Provided manual therapy to mobilize proximal hip and LS spine soft tissue/joints for the purpose of modulating pain, promoting relaxation,  increasing ROM, reducing/eliminating soft tissue swelling/inflammation/restriction, improving soft tissue extensibility and allowing for proper ROM for normal function with self care, mobility, lifting and ambulation. If BWC Please Indicate Time In/Out  CPT Code Time in Time out                         Approval Dates:  CPT Code Units Approved Units Used  Date Updated:                     Charges:  Timed Code Treatment Minutes: 25   Total Treatment Minutes: 50     [x] EVAL (LOW) 16341 (typically 20 minutes face-to-face)  [] EVAL (MOD) 51039 (typically 30 minutes face-to-face)  [] EVAL (HIGH) 32727 (typically 45 minutes face-to-face)  [] RE-EVAL     [x] XB(74808) x 2    [] Dry needle 1 or 2 Muscles (36746)  [] NMR (15793) x     [] Dry needle 3+ Muscles (15598)  [] Manual (74418) x     [] Ultrasound (96952) x  [] TA (03063) x     [] Mech Traction (72462)  [] ES(attended) (11052)     [] ES (un) (46919):   [] Vasopump (03908) [] Ionto (24179)   [] Other:    GOALS:  Patient stated goal: \"To be able to move better, to go back to exercising, be able to do housework, to do my gardens\"  []? Progressing: []? Met: []? Not Met: []? Adjusted  Therapist goals for Patient:   Short Term Goals: To be achieved in: 2 weeks  1. Independent in HEP and progression per patient tolerance, in order to prevent re-injury. []? Progressing: []? Met: []? Not Met: []? Adjusted  2. Patient will have a decrease in pain to facilitate improvement in movement, function, and ADLs as indicated by Functional Deficits. []? Progressing: []? Met: []? Not Met: []? Adjusted     Long Term Goals: To be achieved in: 8 weeks  1. Disability index score of 25% or less for the ALON to assist with reaching prior level of function. []? Progressing: []? Met: []? Not Met: []? Adjusted  2. Patient will demonstrate increased AROM to WNL, good LS mobility, good hip ROM to allow for proper joint functioning as indicated by patients Functional Deficits. []? Progressing: []? Met: []? Not Met: []? Adjusted  3.  Patient will demonstrate an increase in Strength L quads and adductors to good proximal hip and core activation to allow for proper functional mobility as indicated by patients Functional Deficits. []? Progressing: []? Met: []? Not Met: []? Adjusted  4. Patient will return to housework activities including cooking 30-60 minutes functional activities without increased symptoms or restriction. []? Progressing: []? Met: []? Not Met: []? Adjusted  5. Patient will be able to lift her grandchildren without aggravating pain. []? Progressing: []? Met: []? Not Met: []? Adjusted         ASSESSMENT:  See eval    Treatment/Activity Tolerance:  [x] Patient tolerated treatment well [] Patient limited by fatique  [] Patient limited by pain  [] Patient limited by other medical complications  [] Other:     Overall Progression Towards Functional goals/ Treatment Progress Update:  [] Patient is progressing as expected towards functional goals listed. [] Progression is slowed due to complexities/Impairments listed. [] Progression has been slowed due to co-morbidities. [x] Plan just implemented, too soon to assess goals progression <30days   [] Goals require adjustment due to lack of progress  [] Patient is not progressing as expected and requires additional follow up with physician  [] Other:    Prognosis for POC: [x] Good [] Fair  [] Poor    Patient requires continued skilled intervention: [x] Yes  [] No        PLAN: See eval; check for response from [facility]  [] Continue per plan of care [] Alter current plan (see comments)  [x] Plan of care initiated [] Hold pending MD visit [] Discharge    Electronically signed by: Christopher Weeks PT    Note: If patient does not return for scheduled/recommended follow up visits, this note will serve as a discharge from care along with the most recent update on progress.

## 2021-07-09 ENCOUNTER — HOSPITAL ENCOUNTER (OUTPATIENT)
Dept: PHYSICAL THERAPY | Age: 61
Setting detail: THERAPIES SERIES
Discharge: HOME | End: 2021-07-09
Payer: COMMERCIAL

## 2021-07-09 PROCEDURE — 97112 NEUROMUSCULAR REEDUCATION: CPT

## 2021-07-09 PROCEDURE — 97140 MANUAL THERAPY 1/> REGIONS: CPT

## 2021-07-09 PROCEDURE — 97110 THERAPEUTIC EXERCISES: CPT

## 2021-07-09 NOTE — FLOWSHEET NOTE
[place] [facility] [place] [address] [place] [address]  Phone: [phone]   Fax:     [phone]    Physical Therapy Treatment Note/ Progress Report:     Date:  2021    Patient Name:  Mike Little    :  1960  MRN: [id number]    Pertinent Medical History:      Medical/Treatment Diagnosis Information:  · Diagnosis: Radiculopathy, lumbar region (M54.16)  · Treatment Diagnosis: Decreased ADL status    Insurance/Certification information:  PT Insurance Information: [employer]- allowed 8 visits thru 21  Physician Information:  Referring Practitioner: Dr. Lynda Alba  Plan of care signed (Y/N): N    Date of Patient follow up with Physician:      Progress Report: []  Yes  [x]  No     Date Range for reporting period:  Beginnin21  Ending:    Progress report due (10 Rx/or 30 days whichever is less):     Recertification due (POC duration/ or 90 days whichever is less):    Visit # POC/ Insurance Allowable Auth Needed   2 8 [x]Yes    []No     Pain level:  8/10   Functional Scale: [place]=21  68 raw    History of Injury:Patient stated in  pt irritated her low back when she lifted her grandchild and pt fell down the steps. Pt has had pain since that incident. More recently pt has had pain as she lifts her grand children. Pain is located at the L lumbar region, L buttock, L lateral thigh and calf, L lateral foot. Pt stated standing for 20 minutes to cook aggravates pain, sitting with a slouched position, walking too much (around the block). Pt is decreased by some yoga stretches and DKTC. SUBJECTIVE:  See eval  21 was sore from HEP. OBJECTIVE:    Observation:    21   Joint mobility: PA L5/S1 caused L lower lumbar pain, L2/L3 caused lower thoracic central pain; L unilateral L4/L5 and L5/S1 caused local pain; R L5/S1 caused local pain.   PIVM flexion: excessive L4/5 flexion, (+) stress test              Palpation: tender to palpation B lumbar paraspinals   Functional Mobility/Transfers: Squat: WFL, but difficulty returning to standing position  (-) heel walk and toe walk   Posture: decreased lumbar lordosis   Gait: (include devices/WB status): FWB, decreased right trunk rotation  Stair climbing: ascending WFL, descending body turned slightly toward right   Test measurements:    ROM   Comments   Lumbar Flex Decreased 25%, SIJ pain     Lumbar Ext Decreased 25%, lower thoracic pain        ROM LEFT RIGHT Comments   Lumbar Side Bend WFL WFL, L LBP     Lumbar Rotation WFL, mid thoracic pain WFL, mid thoracic pain     Quadrant neg Pain, L lumbar     LE [facility] WFL        Myotomes/Strength Normal Abnormal Comments   [x]? ALL NORMAL         Hip Abd Normal       ADD     L 3+/5   Hip Ext     B 4-/5   Hip flexion (L1-L2 femoral) [x]?  []?      Knee extension (L2-L4 femoral) []?  [x]?  L quads 4+/5   Knee flexion (S1 sciatic) Normal       Dorsiflexion (L4-L5 deep peroneal) [x]?  []?      Great Toe Ext (L5 deep peroneal nerve) [x]?  []?      Ankle Eversion (S1-S2 super peroneal) []?  []?  L EV 4+/5   Ankle PF(S1-S2 tibial) []?  []?      Multifidus []?  []?      Transverse Ab []?  []?             RESTRICTIONS/PRECAUTIONS: allergic to nuts    Exercises/Interventions:     Therapeutic Ex (04743)   Min:15 Repetitions/Resistance Notes/Cues   Stretch hamstring 2x30\"    Stretch gastroc 2x30\"    Leg press 80#, 3x10 Sled #3   Rower  Narrow   Wide    20#' 2x10  20#, 2x10                             Manual Intervention (74708) Min:15     STM L lumbar L4-S1 multifidi    METto correct L on R sacral torsion  L unilateral flexed sacrum    Mob thoracolumbar     NMR re-education (02782)   Min:15     Prone piriformis squeeze 5\", 10x    Prone LE raise 10x    Prone hip IR/ER 10x    Ab stab with march 10x    Bridge 10x    Sitting trunk flexion with 2 hands on green ball 20x              Therapeutic Activity (72604) Min:               Modalities  Min:             Other Therapeutic Activities:  Pt was educated on PT POC, Diagnosis, Prognosis, pathomechanics as well as frequency and duration of scheduling future physical therapy appointments. Time was also taken on this day to answer all patient questions and participation in PT. Reviewed appointment policy in detail with patient and patient verbalized understanding. Home Exercise Program: DFRLBLPN DKTC, LTR stretch, piriformis stretch, PPT, bridge, cat camel    Therapeutic Exercise and NMR EXR  [] (47469) Provided verbal/tactile cueing for activities related to strengthening, flexibility, endurance, ROM  for improvements in proximal hip and core control with self care, mobility, lifting and ambulation.  [] (58874) Provided verbal/tactile cueing for activities related to improving balance, coordination, kinesthetic sense, posture, motor skill, proprioception  to assist with core control in self care, mobility, lifting, and ambulation.      Therapeutic Activities:    [] (33606 or 08721) Provided verbal/tactile cueing for activities related to improving balance, coordination, kinesthetic sense, posture, motor skill, proprioception and motor activation to allow for proper function  with self care and ADLs  [] (67731) Provided training and instruction to the patient for proper core and proximal hip recruitment and positioning with ambulation re-education     Home Exercise Program:    [] (45877) Reviewed/Progressed HEP activities related to strengthening, flexibility, endurance, ROM of core, proximal hip and LE for functional self-care, mobility, lifting and ambulation   [] (46072) Reviewed/Progressed HEP activities related to improving balance, coordination, kinesthetic sense, posture, motor skill, proprioception of core, proximal hip and LE for self care, mobility, lifting, and ambulation      Manual Treatments:  PROM / STM / Oscillations-Mobs:  G-I, II, III, IV (PA's, Inf., Post.)  [] (93487) Provided manual therapy to mobilize proximal hip and LS spine soft tissue/joints for the purpose of modulating pain, promoting relaxation,  increasing ROM, reducing/eliminating soft tissue swelling/inflammation/restriction, improving soft tissue extensibility and allowing for proper ROM for normal function with self care, mobility, lifting and ambulation. Approval Dates:  CPT Code Units Approved Units Used  Date Updated:     8 thru 9/1/21                Charges:  Timed Code Treatment Minutes: 45   Total Treatment Minutes: 45     [] EVAL (LOW) 77446 (typically 20 minutes face-to-face)  [] EVAL (MOD) 94081 (typically 30 minutes face-to-face)  [] EVAL (HIGH) 14282 (typically 45 minutes face-to-face)  [] RE-EVAL     [x] SH(59133) x     [] Dry needle 1 or 2 Muscles (03030)  [x] NMR (18723) x     [] Dry needle 3+ Muscles (74016)  [x] Manual (38923) x     [] Ultrasound (76441) x  [] TA (44158) x     [] Mech Traction (77403)  [] ES(attended) (41265)     [] ES (un) (78985):   [] Vasopump (83640) [] Ionto (80834)   [] Other:    GOALS:  Patient stated goal: \"To be able to move better, to go back to exercising, be able to do housework, to do my gardens\"  []? Progressing: []? Met: []? Not Met: []? Adjusted  Therapist goals for Patient:   Short Term Goals: To be achieved in: 2 weeks  1. Independent in HEP and progression per patient tolerance, in order to prevent re-injury. []? Progressing: []? Met: []? Not Met: []? Adjusted  2. Patient will have a decrease in pain to facilitate improvement in movement, function, and ADLs as indicated by Functional Deficits. []? Progressing: []? Met: []? Not Met: []? Adjusted     Long Term Goals: To be achieved in: 8 weeks  1. Disability index score of 25% or less for the ALON to assist with reaching prior level of function. []? Progressing: []? Met: []? Not Met: []? Adjusted  2.  Patient will demonstrate increased AROM to WNL, good LS mobility, good hip ROM to allow for proper joint functioning as indicated by patients Functional Deficits. []? Progressing: []? Met: []? Not Met: []? Adjusted  3. Patient will demonstrate an increase in Strength L quads and adductors to good proximal hip and core activation to allow for proper functional mobility as indicated by patients Functional Deficits. []? Progressing: []? Met: []? Not Met: []? Adjusted  4. Patient will return to housework activities including cooking 30-60 minutes functional activities without increased symptoms or restriction. []? Progressing: []? Met: []? Not Met: []? Adjusted  5. Patient will be able to lift her grandchildren without aggravating pain. []? Progressing: []? Met: []? Not Met: []? Adjusted         ASSESSMENT:  See eval    Treatment/Activity Tolerance:  [x] Patient tolerated treatment well [] Patient limited by fatique  [] Patient limited by pain  [] Patient limited by other medical complications  [] Other:     Overall Progression Towards Functional goals/ Treatment Progress Update:  [] Patient is progressing as expected towards functional goals listed. [] Progression is slowed due to complexities/Impairments listed. [] Progression has been slowed due to co-morbidities. [x] Plan just implemented, too soon to assess goals progression <30days   [] Goals require adjustment due to lack of progress  [] Patient is not progressing as expected and requires additional follow up with physician  [] Other:    Prognosis for POC: [x] Good [] Fair  [] Poor    Patient requires continued skilled intervention: [x] Yes  [] No        PLAN:   [x] Continue per plan of care [] Alter current plan (see comments)  [] Plan of care initiated [] Hold pending MD visit [] Discharge    Electronically signed by: Kallie Villalba PT    Note: If patient does not return for scheduled/recommended follow up visits, this note will serve as a discharge from care along with the most recent update on progress.

## 2021-07-20 ENCOUNTER — HOSPITAL ENCOUNTER (OUTPATIENT)
Dept: PHYSICAL THERAPY | Age: 61
Setting detail: THERAPIES SERIES
Discharge: HOME | End: 2021-07-20
Payer: COMMERCIAL

## 2021-07-20 PROCEDURE — 97112 NEUROMUSCULAR REEDUCATION: CPT

## 2021-07-20 PROCEDURE — 97140 MANUAL THERAPY 1/> REGIONS: CPT

## 2021-07-20 PROCEDURE — 97110 THERAPEUTIC EXERCISES: CPT

## 2021-07-20 NOTE — FLOWSHEET NOTE
[place] [facility] [place] [address]. [place] [address]  Phone: [phone]   Fax:     [phone]    Physical Therapy Treatment Note/ Progress Report:     Date:  2021    Patient Name:  Gayathri Leung    :  1960  MRN: [id number]    Pertinent Medical History:      Medical/Treatment Diagnosis Information:  · Diagnosis: Radiculopathy, lumbar region (M54.16)  · Treatment Diagnosis: Decreased ADL status    Insurance/Certification information:  PT Insurance Information: [employer]- allowed 8 visits thru 21  Physician Information:  Referring Practitioner: Dr. Isacc Gandhi  Plan of care signed (Y/N): Y    Date of Patient follow up with Physician:      Progress Report: []  Yes  [x]  No     Date Range for reporting period:  Beginnin21  Ending:    Progress report due (10 Rx/or 30 days whichever is less):     Recertification due (POC duration/ or 90 days whichever is less):    Visit # POC/ Insurance Allowable Auth Needed   3 8 [x]Yes    []No     Pain level:  8/10   Functional Scale: [place]=21  68 raw    History of Injury:Patient stated in  pt irritated her low back when she lifted her grandchild and pt fell down the steps. Pt has had pain since that incident. More recently pt has had pain as she lifts her grand children. Pain is located at the L lumbar region, L buttock, L lateral thigh and calf, L lateral foot. Pt stated standing for 20 minutes to cook aggravates pain, sitting with a slouched position, walking too much (around the block). Pt is decreased by some yoga stretches and DKTC. SUBJECTIVE:  See eval  21 was sore from HEP.  21 pt stated she has more L LE pain, does not know why. Pt has been able to stand a less amount of time. None of the HEP cause pain, but has soreness with HEP.   Currently pain is located at the L buttock, L lateral and anterior thigh, L anterior shin and plantar surface of foot and was described as \"somewhat burning, somewhat tingling. \"  Pt had traction at chiropractor on Friday which helped some with her pain. OBJECTIVE:    Observation:    7/1/21   Joint mobility: PA L5/S1 caused L lower lumbar pain, L2/L3 caused lower thoracic central pain; L unilateral L4/L5 and L5/S1 caused local pain; R L5/S1 caused local pain. PIVM flexion: excessive L4/5 flexion, (+) stress test              Palpation: tender to palpation B lumbar paraspinals   Functional Mobility/Transfers: Squat: WFL, but difficulty returning to standing position  (-) heel walk and toe walk   Posture: decreased lumbar lordosis    Gait: (include devices/WB status): FWB, decreased right trunk rotation  Stair climbing: ascending WFL, descending body turned slightly toward right     Test measurements:    ROM   Comments   Lumbar Flex Decreased 25%, SIJ pain     Lumbar Ext Decreased 25%, lower thoracic pain        ROM LEFT RIGHT Comments   Lumbar Side Bend WFL WFL, L LBP     Lumbar Rotation WFL, mid thoracic pain WFL, mid thoracic pain     Quadrant neg Pain, L lumbar     LE [facility] [place]        Myotomes/Strength Normal Abnormal Comments   [x]? ALL NORMAL         Hip Abd Normal       ADD     L 3+/5   Hip Ext     B 4-/5   Hip flexion (L1-L2 femoral) [x]?  []?      Knee extension (L2-L4 femoral) []?  [x]?  L quads 4+/5   Knee flexion (S1 sciatic) Normal       Dorsiflexion (L4-L5 deep peroneal) [x]?  []?      Great Toe Ext (L5 deep peroneal nerve) [x]?  []?      Ankle Eversion (S1-S2 super peroneal) []?  []?  L EV 4+/5   Ankle PF(S1-S2 tibial) []?  []?      Multifidus []?  []?      Transverse Ab []?  []?             RESTRICTIONS/PRECAUTIONS: allergic to nuts    Exercises/Interventions:     Therapeutic Ex (76977)   Min:12 Repetitions/Resistance Notes/Cues   Stretch hamstring 2x30\"    Stretch gastroc 2x30\"    Leg press 80#, 3x10 Sled #3   Rower  Narrow   Wide    20#' 2x10  20#, 2x10 Manual Intervention (91370) Min:20     STM L lumbar L4-S1 multifidi, L piriformis In R SL   L lower lumbar gapping mob Grade IV, 3' hold L LE numbness was eliminated following manual today, 7/20/21   Mob thoracolumbar     NMR re-education (27272)   Min:13     Prone piriformis squeeze 5\", 10x    Prone LE raise 10x    Prone hip IR/ER 10x    Ab stab with march 10x    Bridge 10x    Sitting trunk flexion with 2 hands on green ball 20x    Clam shell L 15x         Therapeutic Activity (99592) Min:               Modalities  Min:             Other Therapeutic Activities:  Pt was educated on PT POC, Diagnosis, Prognosis, pathomechanics as well as frequency and duration of scheduling future physical therapy appointments. Time was also taken on this day to answer all patient questions and participation in PT. Reviewed appointment policy in detail with patient and patient verbalized understanding. Home Exercise Program: DFRLBLPN DKTC, LTR stretch, piriformis stretch, PPT, bridge, cat camel    Therapeutic Exercise and NMR EXR  [] (22880) Provided verbal/tactile cueing for activities related to strengthening, flexibility, endurance, ROM  for improvements in proximal hip and core control with self care, mobility, lifting and ambulation.  [] (44079) Provided verbal/tactile cueing for activities related to improving balance, coordination, kinesthetic sense, posture, motor skill, proprioception  to assist with core control in self care, mobility, lifting, and ambulation.      Therapeutic Activities:    [] (51793 or 94471) Provided verbal/tactile cueing for activities related to improving balance, coordination, kinesthetic sense, posture, motor skill, proprioception and motor activation to allow for proper function  with self care and ADLs  [] (46837) Provided training and instruction to the patient for proper core and proximal hip recruitment and positioning with ambulation re-education     Home Exercise Program: [] (19223) Reviewed/Progressed HEP activities related to strengthening, flexibility, endurance, ROM of core, proximal hip and LE for functional self-care, mobility, lifting and ambulation   [] (04029) Reviewed/Progressed HEP activities related to improving balance, coordination, kinesthetic sense, posture, motor skill, proprioception of core, proximal hip and LE for self care, mobility, lifting, and ambulation      Manual Treatments:  PROM / STM / Oscillations-Mobs:  G-I, II, III, IV (PA's, Inf., Post.)  [] (17762) Provided manual therapy to mobilize proximal hip and LS spine soft tissue/joints for the purpose of modulating pain, promoting relaxation,  increasing ROM, reducing/eliminating soft tissue swelling/inflammation/restriction, improving soft tissue extensibility and allowing for proper ROM for normal function with self care, mobility, lifting and ambulation. Approval Dates:  CPT Code Units Approved Units Used  Date Updated:     8 thru 9/1/21                Charges:  Timed Code Treatment Minutes: 45   Total Treatment Minutes: 45     [] EVAL (LOW) 64480 (typically 20 minutes face-to-face)  [] EVAL (MOD) 09303 (typically 30 minutes face-to-face)  [] EVAL (HIGH) 32100 (typically 45 minutes face-to-face)  [] RE-EVAL     [x] LC(67760) x     [] Dry needle 1 or 2 Muscles (55934)  [x] NMR (69679) x     [] Dry needle 3+ Muscles (33657)  [x] Manual (59545) x     [] Ultrasound (87855) x  [] TA (72539) x     [] Mech Traction (26258)  [] ES(attended) (91021)     [] ES (un) (76927):   [] Vasopump (29110) [] Ionto (74473)   [] Other:    GOALS:  Patient stated goal: \"To be able to move better, to go back to exercising, be able to do housework, to do my gardens\"  []? Progressing: []? Met: []? Not Met: []? Adjusted  Therapist goals for Patient:   Short Term Goals: To be achieved in: 2 weeks  1. Independent in HEP and progression per patient tolerance, in order to prevent re-injury. []? Progressing: []? Met: []?  Not Met: []? Adjusted  2. Patient will have a decrease in pain to facilitate improvement in movement, function, and ADLs as indicated by Functional Deficits. []? Progressing: []? Met: []? Not Met: []? Adjusted     Long Term Goals: To be achieved in: 8 weeks  1. Disability index score of 25% or less for the ALON to assist with reaching prior level of function. []? Progressing: []? Met: []? Not Met: []? Adjusted  2. Patient will demonstrate increased AROM to WNL, good LS mobility, good hip ROM to allow for proper joint functioning as indicated by patients Functional Deficits. []? Progressing: []? Met: []? Not Met: []? Adjusted  3. Patient will demonstrate an increase in Strength L quads and adductors to good proximal hip and core activation to allow for proper functional mobility as indicated by patients Functional Deficits. []? Progressing: []? Met: []? Not Met: []? Adjusted  4. Patient will return to housework activities including cooking 30-60 minutes functional activities without increased symptoms or restriction. []? Progressing: []? Met: []? Not Met: []? Adjusted  5. Patient will be able to lift her grandchildren without aggravating pain. []? Progressing: []? Met: []? Not Met: []? Adjusted         ASSESSMENT:  See eval    Treatment/Activity Tolerance:  [x] Patient tolerated treatment well [] Patient limited by fatique  [] Patient limited by pain  [] Patient limited by other medical complications  [] Other:     Overall Progression Towards Functional goals/ Treatment Progress Update:  [] Patient is progressing as expected towards functional goals listed. [] Progression is slowed due to complexities/Impairments listed. [] Progression has been slowed due to co-morbidities.   [x] Plan just implemented, too soon to assess goals progression <30days   [] Goals require adjustment due to lack of progress  [] Patient is not progressing as expected and requires additional follow up with physician  [] Other:    Prognosis for POC: [x] Good [] Fair  [] Poor    Patient requires continued skilled intervention: [x] Yes  [] No        PLAN:   [x] Continue per plan of care [] Alter current plan (see comments)  [] Plan of care initiated [] Hold pending MD visit [] Discharge    Electronically signed by: Alek Soares PT    Note: If patient does not return for scheduled/recommended follow up visits, this note will serve as a discharge from care along with the most recent update on progress.

## 2021-07-22 ENCOUNTER — HOSPITAL ENCOUNTER (OUTPATIENT)
Dept: PHYSICAL THERAPY | Age: 61
Setting detail: THERAPIES SERIES
Discharge: HOME | End: 2021-07-22
Payer: COMMERCIAL

## 2021-07-22 PROCEDURE — 97110 THERAPEUTIC EXERCISES: CPT

## 2021-07-22 PROCEDURE — 97112 NEUROMUSCULAR REEDUCATION: CPT

## 2021-07-22 PROCEDURE — 97140 MANUAL THERAPY 1/> REGIONS: CPT

## 2021-07-22 NOTE — FLOWSHEET NOTE
anterior shin and plantar surface of foot and was described as \"somewhat burning, somewhat tingling. \"  Pt had traction at chiropractor on Friday which helped some with her pain. OBJECTIVE:    Observation:    7/1/21   Joint mobility: PA L5/S1 caused L lower lumbar pain, L2/L3 caused lower thoracic central pain; L unilateral L4/L5 and L5/S1 caused local pain; R L5/S1 caused local pain. PIVM flexion: excessive L4/5 flexion, (+) stress test              Palpation: tender to palpation B lumbar paraspinals   Functional Mobility/Transfers: Squat: WFL, but difficulty returning to standing position  (-) heel walk and toe walk   Posture: decreased lumbar lordosis    Gait: (include devices/WB status): FWB, decreased right trunk rotation  Stair climbing: ascending WFL, descending body turned slightly toward right     Test measurements:    ROM   Comments   Lumbar Flex Decreased 25%, SIJ pain     Lumbar Ext Decreased 25%, lower thoracic pain        ROM LEFT RIGHT Comments   Lumbar Side Bend WFL WFL, L LBP     Lumbar Rotation WFL, mid thoracic pain WFL, mid thoracic pain     Quadrant neg Pain, L lumbar     LE [facility] [place]        Myotomes/Strength Normal Abnormal Comments   [x]? ALL NORMAL         Hip Abd Normal       ADD     L 3+/5   Hip Ext     B 4-/5   Hip flexion (L1-L2 femoral) [x]?  []?      Knee extension (L2-L4 femoral) []?  [x]?  L quads 4+/5   Knee flexion (S1 sciatic) Normal       Dorsiflexion (L4-L5 deep peroneal) [x]?  []?      Great Toe Ext (L5 deep peroneal nerve) [x]?  []?      Ankle Eversion (S1-S2 super peroneal) []?  []?  L EV 4+/5   Ankle PF(S1-S2 tibial) []?  []?      Multifidus []?  []?      Transverse Ab []?  []?             RESTRICTIONS/PRECAUTIONS: allergic to nuts    Exercises/Interventions:     Therapeutic Ex (09698)   Min:12 Repetitions/Resistance Notes/Cues   Stretch hamstring 2x30\"    Stretch gastroc 2x30\"    Leg press 80#, 3x10 Sled #3   Rower  Narrow   Wide    20#', 30# x10  20#, 30# x10 Manual Intervention (17469) Min:20     STM L lumbar L4-S1 multifidi, L piriformis In R SL   L lower lumbar gapping mob Grade IV, 3' hold L LE numbness was eliminated following manual today, 7/20/21        NMR re-education (62506)   Min:13     Prone piriformis squeeze 5\", 10x    Prone LE raise 10x    Prone hip IR/ER 10x    Ab stab with march 10x    Bridge 10x    Sitting trunk flexion with 2 hands on green ball 20x    Clam shell L 15x         Therapeutic Activity (45151) Min:               Modalities  Min:             Other Therapeutic Activities:  Pt was educated on PT POC, Diagnosis, Prognosis, pathomechanics as well as frequency and duration of scheduling future physical therapy appointments. Time was also taken on this day to answer all patient questions and participation in PT. Reviewed appointment policy in detail with patient and patient verbalized understanding. Home Exercise Program: DFRLBLPN DKTC, LTR stretch, piriformis stretch, PPT, bridge, cat camel  7/22/21 instructed segmental gapping    Therapeutic Exercise and NMR EXR  [] (85001) Provided verbal/tactile cueing for activities related to strengthening, flexibility, endurance, ROM  for improvements in proximal hip and core control with self care, mobility, lifting and ambulation.  [] (65047) Provided verbal/tactile cueing for activities related to improving balance, coordination, kinesthetic sense, posture, motor skill, proprioception  to assist with core control in self care, mobility, lifting, and ambulation.      Therapeutic Activities:    [] (39391 or 89536) Provided verbal/tactile cueing for activities related to improving balance, coordination, kinesthetic sense, posture, motor skill, proprioception and motor activation to allow for proper function  with self care and ADLs  [] (59237) Provided training and instruction to the patient for proper core and proximal hip recruitment and positioning with ambulation re-education Progressing: []? Met: []? Not Met: []? Adjusted  2. Patient will have a decrease in pain to facilitate improvement in movement, function, and ADLs as indicated by Functional Deficits. []? Progressing: []? Met: []? Not Met: []? Adjusted     Long Term Goals: To be achieved in: 8 weeks  1. Disability index score of 25% or less for the ALON to assist with reaching prior level of function. []? Progressing: []? Met: []? Not Met: []? Adjusted  2. Patient will demonstrate increased AROM to WNL, good LS mobility, good hip ROM to allow for proper joint functioning as indicated by patients Functional Deficits. []? Progressing: []? Met: []? Not Met: []? Adjusted  3. Patient will demonstrate an increase in Strength L quads and adductors to good proximal hip and core activation to allow for proper functional mobility as indicated by patients Functional Deficits. []? Progressing: []? Met: []? Not Met: []? Adjusted  4. Patient will return to housework activities including cooking 30-60 minutes functional activities without increased symptoms or restriction. []? Progressing: []? Met: []? Not Met: []? Adjusted  5. Patient will be able to lift her grandchildren without aggravating pain. []? Progressing: []? Met: []? Not Met: []? Adjusted         ASSESSMENT:  See eval    Treatment/Activity Tolerance:  [x] Patient tolerated treatment well [] Patient limited by fatique  [] Patient limited by pain  [] Patient limited by other medical complications  [] Other:     Overall Progression Towards Functional goals/ Treatment Progress Update:  [] Patient is progressing as expected towards functional goals listed. [] Progression is slowed due to complexities/Impairments listed. [] Progression has been slowed due to co-morbidities.   [x] Plan just implemented, too soon to assess goals progression <30days   [] Goals require adjustment due to lack of progress  [] Patient is not progressing as expected and requires additional follow up with physician  [] Other:    Prognosis for POC: [x] Good [] Fair  [] Poor    Patient requires continued skilled intervention: [x] Yes  [] No        PLAN:   [x] Continue per plan of care [] Alter current plan (see comments)  [] Plan of care initiated [] Hold pending MD visit [] Discharge    Electronically signed by: Jaime Abdul PT    Note: If patient does not return for scheduled/recommended follow up visits, this note will serve as a discharge from care along with the most recent update on progress.

## 2021-07-27 ENCOUNTER — HOSPITAL ENCOUNTER (OUTPATIENT)
Dept: PHYSICAL THERAPY | Age: 61
Setting detail: THERAPIES SERIES
Discharge: HOME | End: 2021-07-27
Payer: COMMERCIAL

## 2021-07-27 PROCEDURE — 97110 THERAPEUTIC EXERCISES: CPT

## 2021-07-27 PROCEDURE — 97112 NEUROMUSCULAR REEDUCATION: CPT

## 2021-07-27 PROCEDURE — 97140 MANUAL THERAPY 1/> REGIONS: CPT

## 2021-07-27 NOTE — FLOWSHEET NOTE
[place] [facility] [place] [address]. [place] [address]  Phone: [phone]   Fax:     [phone]    Physical Therapy Treatment Note/ Progress Report:     Date:  2021    Patient Name:  Mela Gifford    :  1960  MRN: [id number]    Pertinent Medical History:      Medical/Treatment Diagnosis Information:  · Diagnosis: Radiculopathy, lumbar region (M54.16)  · Treatment Diagnosis: Decreased ADL status    Insurance/Certification information:  PT Insurance Information: [employer]- allowed 8 visits thru 21  Physician Information:  Referring Practitioner: Dr. Emily Bradshaw  Plan of care signed (Y/N): Y    Date of Patient follow up with Physician:      Progress Report: []  Yes  [x]  No     Date Range for reporting period:  Beginnin21  Ending:    Progress report due (10 Rx/or 30 days whichever is less):     Recertification due (POC duration/ or 90 days whichever is less):    Visit # POC/ Insurance Allowable Auth Needed   5 8 [x]Yes    []No     Pain level:  8/10   Functional Scale: [place]=21  68 raw    History of Injury:Patient stated in  pt irritated her low back when she lifted her grandchild and pt fell down the steps. Pt has had pain since that incident. More recently pt has had pain as she lifts her grand children. Pain is located at the L lumbar region, L buttock, L lateral thigh and calf, L lateral foot. Pt stated standing for 20 minutes to cook aggravates pain, sitting with a slouched position, walking too much (around the block). Pt is decreased by some yoga stretches and DKTC. SUBJECTIVE:  See eval  21 was sore from HEP.  21 pt stated she has more L LE pain, does not know why. Pt has been able to stand a less amount of time. None of the HEP cause pain, but has soreness with HEP.   Currently pain is located at the L buttock, L lateral and anterior thigh, L stated goal: \"To be able to move better, to go back to exercising, be able to do housework, to do my gardens\"  []? Progressing: []? Met: []? Not Met: []? Adjusted  Therapist goals for Patient:   Short Term Goals: To be achieved in: 2 weeks  1. Independent in HEP and progression per patient tolerance, in order to prevent re-injury. []? Progressing: []? Met: []? Not Met: []? Adjusted  2. Patient will have a decrease in pain to facilitate improvement in movement, function, and ADLs as indicated by Functional Deficits. []? Progressing: []? Met: []? Not Met: []? Adjusted     Long Term Goals: To be achieved in: 8 weeks  1. Disability index score of 25% or less for the ALON to assist with reaching prior level of function. []? Progressing: []? Met: []? Not Met: []? Adjusted  2. Patient will demonstrate increased AROM to WNL, good LS mobility, good hip ROM to allow for proper joint functioning as indicated by patients Functional Deficits. []? Progressing: []? Met: []? Not Met: []? Adjusted  3. Patient will demonstrate an increase in Strength L quads and adductors to good proximal hip and core activation to allow for proper functional mobility as indicated by patients Functional Deficits. []? Progressing: []? Met: []? Not Met: []? Adjusted  4. Patient will return to housework activities including cooking 30-60 minutes functional activities without increased symptoms or restriction. []? Progressing: []? Met: []? Not Met: []? Adjusted  5. Patient will be able to lift her grandchildren without aggravating pain. []? Progressing: []? Met: []? Not Met: []?  Adjusted         ASSESSMENT:  See eval    Treatment/Activity Tolerance:  [x] Patient tolerated treatment well [] Patient limited by fatique  [] Patient limited by pain  [] Patient limited by other medical complications  [] Other:     Overall Progression Towards Functional goals/ Treatment Progress Update:  [] Patient is progressing as expected towards functional goals listed. [] Progression is slowed due to complexities/Impairments listed. [] Progression has been slowed due to co-morbidities. [x] Plan just implemented, too soon to assess goals progression <30days   [] Goals require adjustment due to lack of progress  [] Patient is not progressing as expected and requires additional follow up with physician  [] Other:    Prognosis for POC: [x] Good [] Fair  [] Poor    Patient requires continued skilled intervention: [x] Yes  [] No        PLAN:   [x] Continue per plan of care [] Alter current plan (see comments)  [] Plan of care initiated [] Hold pending MD visit [] Discharge    Electronically signed by: Alek Soares PT , OMT-C, [id number]      Note: If patient does not return for scheduled/recommended follow up visits, this note will serve as a discharge from care along with the most recent update on progress.

## 2021-07-29 ENCOUNTER — HOSPITAL ENCOUNTER (OUTPATIENT)
Dept: PHYSICAL THERAPY | Age: 61
Setting detail: THERAPIES SERIES
Discharge: HOME | End: 2021-07-29
Payer: COMMERCIAL

## 2021-07-29 PROCEDURE — 97110 THERAPEUTIC EXERCISES: CPT

## 2021-07-29 PROCEDURE — 97112 NEUROMUSCULAR REEDUCATION: CPT

## 2021-07-29 PROCEDURE — 97140 MANUAL THERAPY 1/> REGIONS: CPT

## 2021-07-29 NOTE — FLOWSHEET NOTE
anterior shin and plantar surface of foot and was described as \"somewhat burning, somewhat tingling. \"  Pt had traction at chiropractor on Friday which helped some with her pain. 7/27/21 pt stated her back is stiff today following installing a sink on Saturday. Pt continues to have L lateral thigh and L calf, L foot. Pt stated HEP \"relieve it, but not continually relieve it. \"  Pt performs HEP \"every day that I'm not here. \"   7/29/21 back is \"very stiff\". Pt stated segmental gapping at home helps control pain for 15-30 minutes. OBJECTIVE:    Observation:    7/1/21   Joint mobility: PA L5/S1 caused L lower lumbar pain, L2/L3 caused lower thoracic central pain; L unilateral L4/L5 and L5/S1 caused local pain; R L5/S1 caused local pain. PIVM flexion: excessive L4/5 flexion, (+) stress test              Palpation: tender to palpation B lumbar paraspinals   Functional Mobility/Transfers: Squat: WFL, but difficulty returning to standing position  (-) heel walk and toe walk   Posture: decreased lumbar lordosis    Gait: (include devices/WB status): FWB, decreased right trunk rotation  Stair climbing: ascending WFL, descending body turned slightly toward right     Test measurements:    ROM   Comments   Lumbar Flex Decreased 25%, SIJ pain     Lumbar Ext Decreased 25%, lower thoracic pain        ROM LEFT RIGHT Comments   Lumbar Side Bend WFL WFL, L LBP     Lumbar Rotation WFL, mid thoracic pain WFL, mid thoracic pain     Quadrant neg Pain, L lumbar     LE [facility] WFL        Myotomes/Strength Normal Abnormal Comments   [x]? ALL NORMAL         Hip Abd Normal       ADD     L 3+/5   Hip Ext     B 4-/5   Hip flexion (L1-L2 femoral) [x]?  []?      Knee extension (L2-L4 femoral) []?  [x]?  L quads 4+/5   Knee flexion (S1 sciatic) Normal       Dorsiflexion (L4-L5 deep peroneal) [x]?  []?      Great Toe Ext (L5 deep peroneal nerve) [x]?  []?      Ankle Eversion (S1-S2 super peroneal) []?  []?  L EV 4+/5   Ankle PF(S1-S2 tibial) []?  []?      Multifidus []?  []?      Transverse Ab []?  []?             RESTRICTIONS/PRECAUTIONS: allergic to nuts    Exercises/Interventions:     Therapeutic Ex (28211)   Min:12 Repetitions/Resistance Notes/Cues   Stretch hamstring 2x30\"    Stretch gastroc 2x30\"    Leg press 80#, 3x10 Sled #3   Rower  Narrow   Wide    30# 2x10  30# 2x10                             Manual Intervention (56470) Min:20     STM L lumbar L4-S1 multifidi, L and R  In prone lying   MET To correct extended sacrum         NMR re-education (98021)   Min:13     Prone piriformis squeeze 5\", 10x    Prone LE raise 10x    Prone hip IR/ER 10x    Ab stab with march 10x    Bridge with add vs pillow  Bridge with ABD vs tband 10x  10x    Sitting trunk flexion with 2 hands on green ball 20x              Therapeutic Activity (47015) Min:               Modalities  Min:             Other Therapeutic Activities:  Pt was educated on PT POC, Diagnosis, Prognosis, pathomechanics as well as frequency and duration of scheduling future physical therapy appointments. Time was also taken on this day to answer all patient questions and participation in PT. Reviewed appointment policy in detail with patient and patient verbalized understanding. Home Exercise Program: DFRLBLPN DKTC, LTR stretch, piriformis stretch, PPT, bridge, cat camel  7/22/21 instructed segmental gapping  7/27/21 reviewed segmental gapping  7/29/21 prone quad stretch    Therapeutic Exercise and NMR EXR  [] (30450) Provided verbal/tactile cueing for activities related to strengthening, flexibility, endurance, ROM  for improvements in proximal hip and core control with self care, mobility, lifting and ambulation.  [] (49081) Provided verbal/tactile cueing for activities related to improving balance, coordination, kinesthetic sense, posture, motor skill, proprioception  to assist with core control in self care, mobility, lifting, and ambulation.      Therapeutic Activities:    [] (34736 or 40744) Provided verbal/tactile cueing for activities related to improving balance, coordination, kinesthetic sense, posture, motor skill, proprioception and motor activation to allow for proper function  with self care and ADLs  [] (06442) Provided training and instruction to the patient for proper core and proximal hip recruitment and positioning with ambulation re-education     Home Exercise Program:    [] (39455) Reviewed/Progressed HEP activities related to strengthening, flexibility, endurance, ROM of core, proximal hip and LE for functional self-care, mobility, lifting and ambulation   [] (33248) Reviewed/Progressed HEP activities related to improving balance, coordination, kinesthetic sense, posture, motor skill, proprioception of core, proximal hip and LE for self care, mobility, lifting, and ambulation      Manual Treatments:  PROM / STM / Oscillations-Mobs:  G-I, II, III, IV (PA's, Inf., Post.)  [] (92969) Provided manual therapy to mobilize proximal hip and LS spine soft tissue/joints for the purpose of modulating pain, promoting relaxation,  increasing ROM, reducing/eliminating soft tissue swelling/inflammation/restriction, improving soft tissue extensibility and allowing for proper ROM for normal function with self care, mobility, lifting and ambulation.        Approval Dates:  CPT Code Units Approved Units Used  Date Updated:     8 thru 9/1/21                Charges:  Timed Code Treatment Minutes: 45   Total Treatment Minutes: 45     [] EVAL (LOW) 44601 (typically 20 minutes face-to-face)  [] EVAL (MOD) 14320 (typically 30 minutes face-to-face)  [] EVAL (HIGH) 23896 (typically 45 minutes face-to-face)  [] RE-EVAL     [x] GJ(51403) x     [] Dry needle 1 or 2 Muscles (36560)  [x] NMR (63045) x     [] Dry needle 3+ Muscles (29235)  [x] Manual (10397) x     [] Ultrasound (91408) x  [] TA (47374) x     [] Mech Traction (44906)  [] ES(attended) (28943)     [] ES (un) (48798):   [] Vasopump (26882) [] Ionto (63808)   [] Other:    GOALS:  Patient stated goal: \"To be able to move better, to go back to exercising, be able to do housework, to do my gardens\"  []? Progressing: []? Met: []? Not Met: []? Adjusted  Therapist goals for Patient:   Short Term Goals: To be achieved in: 2 weeks  1. Independent in HEP and progression per patient tolerance, in order to prevent re-injury. []? Progressing: []? Met: []? Not Met: []? Adjusted  2. Patient will have a decrease in pain to facilitate improvement in movement, function, and ADLs as indicated by Functional Deficits. []? Progressing: []? Met: []? Not Met: []? Adjusted     Long Term Goals: To be achieved in: 8 weeks  1. Disability index score of 25% or less for the ALON to assist with reaching prior level of function. []? Progressing: []? Met: []? Not Met: []? Adjusted  2. Patient will demonstrate increased AROM to WNL, good LS mobility, good hip ROM to allow for proper joint functioning as indicated by patients Functional Deficits. []? Progressing: []? Met: []? Not Met: []? Adjusted  3. Patient will demonstrate an increase in Strength L quads and adductors to good proximal hip and core activation to allow for proper functional mobility as indicated by patients Functional Deficits. []? Progressing: []? Met: []? Not Met: []? Adjusted  4. Patient will return to housework activities including cooking 30-60 minutes functional activities without increased symptoms or restriction. []? Progressing: []? Met: []? Not Met: []? Adjusted  5. Patient will be able to lift her grandchildren without aggravating pain. []? Progressing: []? Met: []? Not Met: []?  Adjusted         ASSESSMENT:  See eval    Treatment/Activity Tolerance:  [x] Patient tolerated treatment well [] Patient limited by fatique  [] Patient limited by pain  [] Patient limited by other medical complications  [] Other:     Overall Progression Towards Functional goals/ Treatment Progress Update:  [] Patient is progressing as expected towards functional goals listed. [] Progression is slowed due to complexities/Impairments listed. [] Progression has been slowed due to co-morbidities. [x] Plan just implemented, too soon to assess goals progression <30days   [] Goals require adjustment due to lack of progress  [] Patient is not progressing as expected and requires additional follow up with physician  [] Other:    Prognosis for POC: [x] Good [] Fair  [] Poor    Patient requires continued skilled intervention: [x] Yes  [] No        PLAN:   [x] Continue per plan of care [] Alter current plan (see comments)  [] Plan of care initiated [] Hold pending MD visit [] Discharge    Electronically signed by: Shubham Suero PT , OMT-C, [id number]      Note: If patient does not return for scheduled/recommended follow up visits, this note will serve as a discharge from care along with the most recent update on progress.

## 2021-08-03 ENCOUNTER — HOSPITAL ENCOUNTER (OUTPATIENT)
Dept: PHYSICAL THERAPY | Age: 61
Setting detail: THERAPIES SERIES
Discharge: HOME | End: 2021-08-03
Payer: COMMERCIAL

## 2021-08-03 PROCEDURE — 97112 NEUROMUSCULAR REEDUCATION: CPT

## 2021-08-03 PROCEDURE — 97110 THERAPEUTIC EXERCISES: CPT

## 2021-08-03 PROCEDURE — 97140 MANUAL THERAPY 1/> REGIONS: CPT

## 2021-08-03 ASSESSMENT — PAIN SCALES - QUEBEC BACK PAIN DISABILITY SCALE
WALK A FEW BLOCKS OR 300 TO 400M: 2
REACH UP TO HIGH SHELVES: 1
BEND OVER TO CLEAN THE BATHTUB: 3
RUN ONE BLOCK OR 100M: 4
STAND UP FOR 20 TO 30 MINUTES: 4
TAKE FOOD OUT OF THE REFRIGERATOR: 2
SLEEP THROUGH THE NIGHT: 3
QUEBEC CMS MODIFIER: CK
THROW A BALL: 2
CLIMB ONE FLIGHT OF STAIRS: 2
RIDE IN A CAR: 3
TURN OVER IN BED: 3
MAKE YOUR BED: 3
CARRY TWO BAGS OF GROCERIES: 3
PUT ON SOCKS OR PANYHOSE: 2
WALK SEVERAL KILOMETERS  OR MILES: 4
GET OUT OF BED: 3
PULL OR PUSH HEAVY DOORS: 3
MOVE A CHAIR: 2
SIT IN A CHAIR FOR SEVERAL HOURS: 4
LIFT AND CARRY A HEAVY SUITCASE: 3
TOTAL SCORE: 56
QUEBEC DISABILITY INDEX: 40-59%

## 2021-08-03 NOTE — FLOWSHEET NOTE
[place] [address]. [place] [address]  Phone: [phone]   Fax:     [phone]       Physical Therapy Discharge Summary    Dear Dr. Narcisa Sr,    We had the pleasure of treating the following patient for physical therapy services at [address] [place] [address]. A summary of our findings can be found in the discharge summary below. If you have any questions or concerns regarding these findings, please do not hesitate to contact me at the office phone number above. Thank you for the referral.     Overall Response to Treatment:   []Patient is responding well to treatment and improvement is noted with regards  to goals   [x]Patient should continue to improve in reasonable time if they continue HEP   []Patient has plateaued and is no longer responding to skilled PT intervention    []Patient is getting worse and would benefit from return to referring MD   []Patient unable to adhere to initial POC   [x]Other: pt either met or was progressing toward all goals. Pt has continued pain, but severity of pain is less. Pt had improved L LE strength. Pt noted improvements in function. Date range of Visits: 21-8/3/21  Total Visits: [address] [facility] [address]  [place] [address]  Phone: [phone]   Fax:     [phone]    Physical Therapy Treatment Note/ Progress Report:     Date:  8/3/2021    Patient Name:  Juno Jane    :  1960  MRN: [id number]    Pertinent Medical History:      Medical/Treatment Diagnosis Information:  · Diagnosis: Radiculopathy, lumbar region (M54.16)  · Treatment Diagnosis: Decreased ADL status    Insurance/Certification information:  PT Insurance Information: Isak Bennett- allowed 8 visits thru 21  Physician Information:  Referring Practitioner: Dr. Levi Cleveland  Plan of care signed (Y/N): Y    Date of Patient follow up with Physician:      Progress Report: []  Yes  [x]  No     Date Range for reporting period:  Beginnin21  Ending:    Progress report due (10 Rx/or 30 days whichever is less):     Recertification due (POC duration/ or 90 days whichever is less):    Visit # POC/ Insurance Allowable Auth Needed   7 8 [x]Yes    []No     Pain level:  8/10   Functional Scale: [place]=21  68 raw    History of Injury:Patient stated in  pt irritated her low back when she lifted her grandchild and pt fell down the steps. Pt has had pain since that incident. More recently pt has had pain as she lifts her grand children. Pain is located at the L lumbar region, L buttock, L lateral thigh and calf, L lateral foot. Pt stated standing for 20 minutes to cook aggravates pain, sitting with a slouched position, walking too much (around the block). Pt is decreased by some yoga stretches and DKTC. SUBJECTIVE:  See eval  21 was sore from HEP.  21 pt stated she has more L LE pain, does not know why. Pt has been able to stand a less amount of time. None of the HEP cause pain, but has soreness with HEP. Currently pain is located at the L buttock, L lateral and anterior thigh, L anterior shin and plantar surface of foot and was described as \"somewhat burning, somewhat tingling. \"  Pt had traction at chiropractor on Friday which helped some with her pain. 21 pt stated her back is stiff today following installing a sink on Saturday. Pt continues to have L lateral thigh and L calf, L foot. Pt stated HEP \"relieve it, but not continually relieve it. \"  Pt performs HEP \"every day that I'm not here. \"   21 back is \"very stiff\". Pt stated segmental gapping at home helps control pain for 15-30 minutes. 8/3/21 pt stated today is her last day of PT and is comfortable with that. Pt has a HEP that she will continue, per report.   Pt stated it is easier for her to lift her grand child, she can walk up and down steps easier. Some days pt can walk better. OBJECTIVE:    Observation: [place] 7/1/21 68 raw; 8/3/21 56 raw   7/1/21   Joint mobility: PA L5/S1 caused L lower lumbar pain, L2/L3 caused lower thoracic central pain; L unilateral L4/L5 and L5/S1 caused local pain; R L5/S1 caused local pain. PIVM flexion: excessive L4/5 flexion, (+) stress test              Palpation: tender to palpation B lumbar paraspinals   Functional Mobility/Transfers: Squat: WFL, but difficulty returning to standing position  (-) heel walk and toe walk   Posture: decreased lumbar lordosis    Gait: (include devices/WB status): FWB, decreased right trunk rotation  Stair climbing: ascending WFL, descending body turned slightly toward right     8/3/21  Joint mobility: PA L5/S1, L4/5, L3/4 caused pain; L unilateral L4/L5 and L5/S1 caused local pain     Palpation: tender to palpation L > R lumbar paraspinals   Functional Mobility/Transfers: Squat: WFL  Posture: decreased lumbar lordosis continues  Gait: (include devices/WB status): FWB, decreased B trunk rotation  Stair climbing: ascending and descend [facility], no longer descended body turned slightly toward right     Test measurements:    ROM 7/1/21  8/3/21   Lumbar Flex Decreased 25%, SIJ pain WFL, SIJ pain    Lumbar Ext Decreased 25%, lower thoracic pain Decreased 25%, lower thoracic pain       ROM LEFT RIGHT Left Right   Lumbar Side Bend [facility] WFL, L LBP WFL  WFL, L LBP   Lumbar Rotation WFL, mid thoracic pain WFL, mid thoracic pain NT  NT   Quadrant neg Pain, L lumbar  NT NT   LE [facility]  NT NT      Myotomes/Strength Normal Abnormal Comments  7/1/21 8/3/21   [x]? ALL NORMAL          Hip Abd Normal        ADD     L 3+/5 L 4/5   Hip Ext     B 4-/5 L 4/5, R 5/5   Hip flexion (L1-L2 femoral) [x]?  []?       Knee extension (L2-L4 femoral) []?  [x]?  L quads 4+/5 L quads 5/5   Knee flexion (S1 sciatic) Normal        Dorsiflexion skill, proprioception  to assist with core control in self care, mobility, lifting, and ambulation. Therapeutic Activities:    [] (08272 or 74876) Provided verbal/tactile cueing for activities related to improving balance, coordination, kinesthetic sense, posture, motor skill, proprioception and motor activation to allow for proper function  with self care and ADLs  [] (88205) Provided training and instruction to the patient for proper core and proximal hip recruitment and positioning with ambulation re-education     Home Exercise Program:    [] (54393) Reviewed/Progressed HEP activities related to strengthening, flexibility, endurance, ROM of core, proximal hip and LE for functional self-care, mobility, lifting and ambulation   [] (53272) Reviewed/Progressed HEP activities related to improving balance, coordination, kinesthetic sense, posture, motor skill, proprioception of core, proximal hip and LE for self care, mobility, lifting, and ambulation      Manual Treatments:  PROM / STM / Oscillations-Mobs:  G-I, II, III, IV (PA's, Inf., Post.)  [] (19485) Provided manual therapy to mobilize proximal hip and LS spine soft tissue/joints for the purpose of modulating pain, promoting relaxation,  increasing ROM, reducing/eliminating soft tissue swelling/inflammation/restriction, improving soft tissue extensibility and allowing for proper ROM for normal function with self care, mobility, lifting and ambulation.        Approval Dates:  CPT Code Units Approved Units Used  Date Updated:     8 thru 9/1/21                Charges:  Timed Code Treatment Minutes: 45   Total Treatment Minutes: 45     [] EVAL (LOW) 39487 (typically 20 minutes face-to-face)  [] EVAL (MOD) 24915 (typically 30 minutes face-to-face)  [] EVAL (HIGH) 23179 (typically 45 minutes face-to-face)  [] RE-EVAL     [x] PL(44366) x     [] Dry needle 1 or 2 Muscles (06729)  [x] NMR (80200) x     [] Dry needle 3+ Muscles (10473)  [x] Manual (20688) x     [] Ultrasound (63178) x  [] TA (56941) x     [] Mech Traction (25742)  [] ES(attended) (29563)     [] ES (un) (83699):   [] Vasopump (30621) [] Ionto (91347)   [] Other:    GOALS:  Patient stated goal: \"To be able to move better, to go back to exercising, be able to do housework, to do my gardens\"  []? Progressing: []? Met: []? Not Met: []? Adjusted  Therapist goals for Patient:   Short Term Goals: To be achieved in: 2 weeks  1. Independent in HEP and progression per patient tolerance, in order to prevent re-injury. []? Progressing: [x]? Met: []? Not Met: []? Adjusted  2. Patient will have a decrease in pain to facilitate improvement in movement, function, and ADLs as indicated by Functional Deficits. []? Progressing: [x]? Met: []? Not Met: []? Adjusted     Long Term Goals: To be achieved in: 8 weeks  1. Disability index score of 25% or less for the ALON to assist with reaching prior level of function. [x]? Progressing: []? Met: []? Not Met: []? Adjusted  2. Patient will demonstrate increased AROM to WNL, good LS mobility, good hip ROM to allow for proper joint functioning as indicated by patients Functional Deficits. []? Progressing: [x]? Met: []? Not Met: []? Adjusted  3. Patient will demonstrate an increase in Strength L quads and adductors to good proximal hip and core activation to allow for proper functional mobility as indicated by patients Functional Deficits. []? Progressing: [x]? Met: []? Not Met: []? Adjusted  4. Patient will return to housework activities including cooking 30-60 minutes functional activities without increased symptoms or restriction. [x]? Progressing: []? Met: []? Not Met: []? Adjusted  5. Patient will be able to lift her grandchildren without aggravating pain. []? Progressing: [x]? Met: []? Not Met: []?  Adjusted         ASSESSMENT:  See eval    Treatment/Activity Tolerance:  [x] Patient tolerated treatment well [] Patient limited by fatique  [] Patient limited by pain  [] Patient limited by other medical complications  [] Other:     Overall Progression Towards Functional goals/ Treatment Progress Update:  [] Patient is progressing as expected towards functional goals listed. [] Progression is slowed due to complexities/Impairments listed. [] Progression has been slowed due to co-morbidities. [x] Plan just implemented, too soon to assess goals progression <30days   [] Goals require adjustment due to lack of progress  [] Patient is not progressing as expected and requires additional follow up with physician  [] Other:    Prognosis for POC: [x] Good [] Fair  [] Poor    Patient requires continued skilled intervention: [x] Yes  [] No        PLAN:   [] Continue per plan of care [] Alter current plan (see comments)  [] Plan of care initiated [] Hold pending MD visit [x] Discharge    Electronically signed by: Rafael Galicia PT , OMT-C, [id number]      Note: If patient does not return for scheduled/recommended follow up visits, this note will serve as a discharge from care along with the most recent update on progress.

## 2021-08-05 ENCOUNTER — APPOINTMENT (OUTPATIENT)
Dept: PHYSICAL THERAPY | Age: 61
End: 2021-08-05
Payer: COMMERCIAL

## 2021-09-14 ENCOUNTER — OFFICE VISIT (OUTPATIENT)
Dept: FAMILY MEDICINE CLINIC | Age: 61
End: 2021-09-14
Payer: COMMERCIAL

## 2021-09-14 VITALS
DIASTOLIC BLOOD PRESSURE: 84 MMHG | HEIGHT: 70 IN | WEIGHT: 208 LBS | HEART RATE: 72 BPM | TEMPERATURE: 98.2 F | SYSTOLIC BLOOD PRESSURE: 128 MMHG | BODY MASS INDEX: 29.78 KG/M2

## 2021-09-14 DIAGNOSIS — I10 ESSENTIAL HYPERTENSION, BENIGN: Primary | ICD-10-CM

## 2021-09-14 DIAGNOSIS — R73.09 ELEVATED GLUCOSE: ICD-10-CM

## 2021-09-14 DIAGNOSIS — E78.2 MIXED HYPERLIPIDEMIA: ICD-10-CM

## 2021-09-14 PROCEDURE — 99214 OFFICE O/P EST MOD 30 MIN: CPT | Performed by: FAMILY MEDICINE

## 2021-09-14 SDOH — ECONOMIC STABILITY: FOOD INSECURITY: WITHIN THE PAST 12 MONTHS, THE FOOD YOU BOUGHT JUST DIDN'T LAST AND YOU DIDN'T HAVE MONEY TO GET MORE.: NEVER TRUE

## 2021-09-14 SDOH — ECONOMIC STABILITY: FOOD INSECURITY: WITHIN THE PAST 12 MONTHS, YOU WORRIED THAT YOUR FOOD WOULD RUN OUT BEFORE YOU GOT MONEY TO BUY MORE.: NEVER TRUE

## 2021-09-14 ASSESSMENT — ENCOUNTER SYMPTOMS
CHEST TIGHTNESS: 0
BLOOD IN STOOL: 0
NAUSEA: 0
ABDOMINAL DISTENTION: 0
DIARRHEA: 0
VOMITING: 0
CONSTIPATION: 0
SHORTNESS OF BREATH: 0
ABDOMINAL PAIN: 0

## 2021-09-14 ASSESSMENT — SOCIAL DETERMINANTS OF HEALTH (SDOH): HOW HARD IS IT FOR YOU TO PAY FOR THE VERY BASICS LIKE FOOD, HOUSING, MEDICAL CARE, AND HEATING?: NOT HARD AT ALL

## 2021-09-14 NOTE — PROGRESS NOTES
levocetirizine (XYZAL) 5 MG tablet, Take 5 mg by mouth nightly , Disp: , Rfl:     No current facility-administered medications for this visit.      ---------------------------               09/14/21 0828         ---------------------------   BP:          128/84         Site:    Left Upper Arm     Position:     Sitting        Cuff Size:  Medium Adult      Pulse:         72           Temp:   98.2 °F (36.8 °C)   TempSrc:    Temporal        Weight: 208 lb (94.3 kg)    Height: 5' 10\" (1.778 m)   ---------------------------  Body mass index is 29.84 kg/m². Wt Readings from Last 3 Encounters:  09/14/21 : 208 lb (94.3 kg)  04/20/21 : 205 lb (93 kg)  03/12/21 : 205 lb (93 kg)    BP Readings from Last 3 Encounters:  09/14/21 : 128/84  03/12/21 : 122/84  09/08/20 : 128/82        Review of Systems   Constitutional: Negative for appetite change, chills, fever and unexpected weight change. Respiratory: Negative for chest tightness and shortness of breath. Cardiovascular: Negative for chest pain, palpitations and leg swelling. Gastrointestinal: Negative for abdominal distention, abdominal pain, blood in stool, constipation, diarrhea, nausea and vomiting. Genitourinary: Negative for difficulty urinating. Neurological: Negative for headaches. Objective:   Physical Exam  Constitutional:       General: She is not in acute distress. Appearance: Normal appearance. She is well-developed. She is not ill-appearing or diaphoretic. Neck:      Thyroid: No thyroid mass or thyromegaly. Cardiovascular:      Rate and Rhythm: Normal rate and regular rhythm. Heart sounds: Normal heart sounds. No murmur heard. No friction rub. No gallop. Comments:     Pulmonary:      Effort: Pulmonary effort is normal. No tachypnea, accessory muscle usage or respiratory distress. Breath sounds: Normal breath sounds.  No decreased breath sounds, wheezing, rhonchi or rales. Abdominal:      General: Bowel sounds are normal. There is no distension or abdominal bruit. Palpations: Abdomen is soft. There is no hepatomegaly, splenomegaly, mass or pulsatile mass. Tenderness: There is no abdominal tenderness. There is no guarding. Musculoskeletal:      Cervical back: Neck supple. Lymphadenopathy:      Cervical: No cervical adenopathy. Upper Body:      Right upper body: No supraclavicular adenopathy. Left upper body: No supraclavicular adenopathy. Skin:     General: Skin is warm and dry. Coloration: Skin is not pale. Nails: There is no clubbing. Neurological:      Mental Status: She is alert. Assessment:        Diagnosis Orders   1. Essential hypertension, benign  Comprehensive Metabolic Panel    Cholesterol, Total    TSH without Reflex   2. Mixed hyperlipidemia  Comprehensive Metabolic Panel    Cholesterol, Total    TSH without Reflex   3.  Elevated glucose  Hemoglobin A1C       Nephrology visit on 5/11/21 reviewed   Ortho visit for her chronic lower back pain done on 4/20/21  Ob gyne visit done on 3/24/21 dr King Staff dr Hannah Guerra on 7/21/21 for her allergies and hx of angioedema  Oncology on 6/11/21 for her possible polycythemia reviewed       Plan:      Do remember to get the mammogram   continue to stay on diet and the current medicines  See in about 6 months         Mana Mary MD

## 2021-09-14 NOTE — PATIENT INSTRUCTIONS
Do remember to get the mammogram   continue to stay on diet and the current medicines  See in about 6 months

## 2021-10-26 NOTE — PROGRESS NOTES
PATIENT REACHED   YES_X___NO____    PREOP INSTRUCTIONS LEFT ON VM NUMBER_______________      DATE__10/28/2021_______ TIME___1230______ARRIVAL__1130______PLACE_masc___________  NOTHING TO EAT OR DRINK  AFTER MIDNIGHT THE EVENING PRIOR OR AS INSTRUCTED BY YOUR DR.  Eliza Katz NEED A RESPONSIBLE ADULT AGE 18 OR OLDER TO DRIVE YOU HOME  PLEASE BRING INSURANCE CARD. PICTURE ID AND COMPLETE LIST OF MEDS  WEAR LOOSE COMFORTABLE CLOTHING  FOLLOW ANY INSTRUCTIONS YOUR DRS OFFICE HAS GIVEN YOU,INCLUDING WHAT MEDICATIONS TO TAKE THE AM OF PROCEDURE AND WHEN AND IF YOU NEED TO STOP ANY BLOOD THINNERS. IF YOU HAVE QUESTIONS REGARDING THIS CALL THE OFFICE  THE GOAL BLOOD SUGAR THE AM OF PROCEDURE  OR LESS ABOVE THAT THE PROCEDURE MAY BE CANCELLED  ANY QUESTIONS CALL YOUR DOCTOR. ALSO,PLEASE READ THE INSTRUCTION PACKET FROM YOUR DR IF YOU RECEIVED ONE. SPINE INTERVENTION NUMBER [phone]      OTHER___________________________________      VISITOR POLICY(subject to change)      There is a one visitor policy at [address] [place] [address] for all surgeries and endoscopies. Whether the visitor can stay or will be asked to wait in the car will depend on the current policy and if social distancing can be maintained. The policy is subject to change at any time. Please make sure the visitor has a cell phone that is on,charged and able to accept calls, as this may be the way that the staff communicates with them. Pain management is NO VISITOR policyThe patients ride is expected to remain in the car with a cell phone for communication. If the ride is leaving the hospital grounds please make sure they are back in time for pickup. Have the patient inform the staff on arrival what their rides plans are while the patient is in the facility. At the MAIN there is one visitor allowed. Please note that the visitor policy is subject to change.

## 2021-10-28 ENCOUNTER — APPOINTMENT (OUTPATIENT)
Dept: GENERAL RADIOLOGY | Age: 61
End: 2021-10-28
Attending: PHYSICAL MEDICINE & REHABILITATION
Payer: COMMERCIAL

## 2021-10-28 ENCOUNTER — HOSPITAL ENCOUNTER (OUTPATIENT)
Age: 61
Setting detail: OUTPATIENT SURGERY
Discharge: HOME | End: 2021-10-28
Attending: PHYSICAL MEDICINE & REHABILITATION | Admitting: PHYSICAL MEDICINE & REHABILITATION
Payer: COMMERCIAL

## 2021-10-28 VITALS
DIASTOLIC BLOOD PRESSURE: 70 MMHG | RESPIRATION RATE: 14 BRPM | TEMPERATURE: 97.7 F | HEIGHT: 70 IN | WEIGHT: 208 LBS | HEART RATE: 68 BPM | BODY MASS INDEX: 29.78 KG/M2 | SYSTOLIC BLOOD PRESSURE: 122 MMHG | OXYGEN SATURATION: 99 %

## 2021-10-28 PROCEDURE — 99152 MOD SED SAME PHYS/QHP 5/>YRS: CPT | Performed by: PHYSICAL MEDICINE & REHABILITATION

## 2021-10-28 PROCEDURE — 3209999900 FLUORO FOR SURGICAL PROCEDURES

## 2021-10-28 PROCEDURE — 3610000056 HC PAIN LEVEL 4 BASE (NON-OR): Performed by: PHYSICAL MEDICINE & REHABILITATION

## 2021-10-28 PROCEDURE — 2709999900 HC NON-CHARGEABLE SUPPLY: Performed by: PHYSICAL MEDICINE & REHABILITATION

## 2021-10-28 PROCEDURE — 2500000003 HC RX 250 WO HCPCS: Performed by: PHYSICAL MEDICINE & REHABILITATION

## 2021-10-28 PROCEDURE — 6360000002 HC RX W HCPCS: Performed by: PHYSICAL MEDICINE & REHABILITATION

## 2021-10-28 RX ORDER — DEXAMETHASONE SODIUM PHOSPHATE 10 MG/ML
INJECTION, SOLUTION INTRAMUSCULAR; INTRAVENOUS
Status: COMPLETED | OUTPATIENT
Start: 2021-10-28 | End: 2021-10-28

## 2021-10-28 RX ORDER — LIDOCAINE HYDROCHLORIDE 10 MG/ML
INJECTION, SOLUTION EPIDURAL; INFILTRATION; INTRACAUDAL; PERINEURAL
Status: COMPLETED | OUTPATIENT
Start: 2021-10-28 | End: 2021-10-28

## 2021-10-28 RX ORDER — FENTANYL CITRATE 50 UG/ML
INJECTION, SOLUTION INTRAMUSCULAR; INTRAVENOUS
Status: COMPLETED | OUTPATIENT
Start: 2021-10-28 | End: 2021-10-28

## 2021-10-28 RX ORDER — MIDAZOLAM HYDROCHLORIDE 1 MG/ML
INJECTION INTRAMUSCULAR; INTRAVENOUS
Status: COMPLETED | OUTPATIENT
Start: 2021-10-28 | End: 2021-10-28

## 2021-10-28 ASSESSMENT — PAIN - FUNCTIONAL ASSESSMENT
PAIN_FUNCTIONAL_ASSESSMENT: 0-10
PAIN_FUNCTIONAL_ASSESSMENT: PREVENTS OR INTERFERES SOME ACTIVE ACTIVITIES AND ADLS

## 2021-10-28 ASSESSMENT — PAIN DESCRIPTION - DESCRIPTORS: DESCRIPTORS: SHOOTING;NUMBNESS

## 2021-10-28 ASSESSMENT — PAIN SCALES - GENERAL: PAINLEVEL_OUTOF10: 0

## 2021-10-28 NOTE — OP NOTE
PATIENT:  Subhash Morrow  AGE:  52 yrs  MEDICAL RECORD #:  [id number]  YOB: 1960     DATE:  10/28/2021  PHYSICIAN: Cherelle Lira M.D. PROCEDURE: Left L4, L5 transforaminal epidural steroid injection under fluoroscopy. PRE-OP DIAGNOSIS:  Low Back Pain/Radiculopathy     POST-OP DIAGNOSIS:  same     HISTORY OF PRESENT ILLNESS:  See office notes. Patient has failed previous less-invasive treatments. ALLERGIES:  Peanut-containing drug products, Soybean-containing drug products, Ace inhibitors, Aspirin, Buckwheat, Cashew nut oil, Nsaids, and Other     MEDICATIONS:    No current facility-administered medications for this encounter. PHYSICAL EXAMINATION:              General:  Awake, alert              Heart:  No audible murmurs, extremities well perfused              Lungs:  No increased WOB or audible wheezing              Extremities:  Normal tone. Warm. No swelling. Anesthesia: 1 mg Versed and 50 mcg fentanyl    Estimated blood loss: None    DESCRIPTION OF PROCEDURE:     Components of the procedure were again reviewed with the patient prior to the procedure. She is aware of risks including infection, bleeding, allergic reaction, and nerve injury. She had ample opportunity for additional questions. She elected to proceed with treatment. The patient was placed in the prone position. Cardiovascular monitoring was initiated, and vital signs were stable prior to, during, and after the procedure. Utilizing fluoroscopy, the L4, L5 vertebrae were identified. The area was sterilely prepped and draped. Skin anesthesia was achieved at each entry site using 1-2 cc of Lidocaine 1%. A 22 g 5.0 inch spinal needle was slowly inserted into the left L4,5 neuroforamen using AP, lateral and oblique fluoroscopic imaging. Negative aspiration was confirmed. 1 cc Isovue-M 300 was injected at each site showing contrast spread into the epidural space and along the nerve root.   A combination of 1 cc Lidocaine 1% and 10 mg dexamethasone were slowly injected at each site. The needles were removed after the stylets were repositioned. A sterile bandage was applied. The patient was brought to recovery in stable condition. The patient tolerated the procedure well. DISPOSITION:  The patient was transported to recovery. The patient was monitored for 15 to 20 minutes post-procedure. Precautions were discussed and written instructions provided. Comment: Good epidural and NR flow.

## 2021-10-28 NOTE — H&P
HISTORY AND PHYSICAL/PRE-SEDATION ASSESSMENT    Patient:  Lizett Porter   :  1960  Medical Record No.:  [id number]   Date:  10/28/2021  Physician:  Ty Giang MD  Facility: [address] [place] [address] Drive:                 The patient is a 57 y.o. female whom presents with leg pain. Review of the imaging and physical exam of the patient confirmed the pre-procedure diagnosis. After a thorough discussion of risks, benefits and alternatives informed consent was obtained. Diagnosis:  M54.16  LUMBAR RADICULOPATHY    Past Medical History:   Past Medical History:   Diagnosis Date    Aldosteronism ([facility] [address]     Asthma     HYPERCHOLESTERAEMIA     Hypertension     Psychogenic nonepileptic seizure         Past Surgical History:     Past Surgical History:   Procedure Laterality Date    CHOLECYSTECTOMY      COLONOSCOPY  2008    normal dr Shayy Hall, had previous polyp in     COLONOSCOPY  8/31/15    polyps, dr Eleni Bocanegra, repeat 3 years    COLONOSCOPY N/A 2018    COLONOSCOPY POLYPECTOMY SNARE/COLD BIOPSY performed by Lynnann Spurling, MD at [address] [place] [address]  2003    OTHER SURGICAL HISTORY  2013    uterine ablation       Current Medications:   Prior to Admission medications    Medication Sig Start Date End Date Taking?  Authorizing Provider   amLODIPine (NORVASC) 10 MG tablet TAKE ONE TABLET BY MOUTH DAILY 21  Yes Lacey Stacy MD   atorvastatin (LIPITOR) 20 MG tablet TAKE ONE TABLET BY MOUTH DAILY 21  Yes Lacey Stacy MD   Fluticasone Furoate (ARNUITY ELLIPTA IN) Inhale 1 puff into the lungs daily Indications: pt is using 200    Yes Historical Provider, MD   Phenylephrine HCl (SUDAFED PE CONGESTION PO) Take 1 tablet by mouth as needed   Yes Historical Provider, MD   acetaminophen (TYLENOL) 325 MG tablet Take 650 mg by mouth every 6 hours as needed for Pain   Yes Historical Provider, MD   fluticasone (FLONASE) 50 MCG/ACT nasal spray 1 spray by Nasal route daily. Yes Historical Provider, MD   metoprolol (TOPROL-XL) 25 MG XL tablet Take 25 mg by mouth daily. Yes Historical Provider, MD   spironolactone (ALDACTONE) 25 MG tablet Take 25 mg by mouth 2 times daily. Yes Historical Provider, MD   Loratadine 10 MG CAPS Take 1 capsule by mouth daily. Yes Historical Provider, MD   levocetirizine (XYZAL) 5 MG tablet Take 5 mg by mouth nightly  6/24/10  Yes Historical Provider, MD   EPINEPHrine (EPIPEN) 0.3 MG/0.3ML JO injection Inject 0.3 mg into the muscle as needed. Use as directed for allergic reaction    Historical Provider, MD   levalbuterol (XOPENEX) 0.63 MG/3ML nebulization 1 ampule every 4 hours as needed     Historical Provider, MD       Allergies:  Peanut-containing drug products, Soybean-containing drug products, Ace inhibitors, Aspirin, Buckwheat, Cashew nut oil, Nsaids, and Other    Social History:    reports that she has never smoked. She has never used smokeless tobacco. She reports current alcohol use of about 0.8 standard drinks of alcohol per week. She reports that she does not use drugs. Family History:   Family History   Problem Relation Age of Onset    Heart Disease Father     High Blood Pressure Father     Cancer Father         BCC        Vitals: Blood pressure (!) 140/88, pulse 77, temperature 97.7 °F (36.5 °C), temperature source Temporal, resp. rate 18, height 5' 10\" (1.778 m), weight 208 lb (94.3 kg), SpO2 100 %. PHYSICAL EXAM:  HENT: Airway patent and reviewed  Cardiovascular: Normal rate, regular rhythm, normal heart sounds. Pulmonary/Chest: No wheezes. No rhonchi. No rales. Abdominal: Soft. Bowel sounds are normal. No distension. Extremities: Moves all extremities equally  Lumbar Spine: Painful range of motion, no midline tenderness     ASA CLASS:         []   I. Normal, healthy adult           [x]   II.  Mild systemic disease            []   III.   Severe systemic disease    Mallampati: Mallampati Class II - (soft palate, fauces & uvula are visible)      Sedation plan:   []  Local              [x]  Minimal                  []  General anesthesia    Treatment plan:  Patient's condition acceptable for planned procedure/sedation. Proceed with planned procedure   Post Procedure Plan   Return to same level of care   ______________________     The risks and benefits as well as alternatives to the procedure have been discussed with the patient and or family. The patient and/or next of kin understands and agrees to proceed.     Theo Saba MD

## 2021-12-09 ENCOUNTER — APPOINTMENT (OUTPATIENT)
Dept: GENERAL RADIOLOGY | Age: 61
End: 2021-12-09
Attending: PHYSICAL MEDICINE & REHABILITATION
Payer: COMMERCIAL

## 2021-12-09 ENCOUNTER — HOSPITAL ENCOUNTER (OUTPATIENT)
Age: 61
Setting detail: OUTPATIENT SURGERY
Discharge: HOME | End: 2021-12-09
Attending: PHYSICAL MEDICINE & REHABILITATION | Admitting: PHYSICAL MEDICINE & REHABILITATION
Payer: COMMERCIAL

## 2021-12-09 VITALS
OXYGEN SATURATION: 99 % | HEIGHT: 70 IN | BODY MASS INDEX: 28.63 KG/M2 | WEIGHT: 200 LBS | SYSTOLIC BLOOD PRESSURE: 137 MMHG | HEART RATE: 96 BPM | RESPIRATION RATE: 14 BRPM | DIASTOLIC BLOOD PRESSURE: 69 MMHG | TEMPERATURE: 97.2 F

## 2021-12-09 PROCEDURE — 2500000003 HC RX 250 WO HCPCS: Performed by: PHYSICAL MEDICINE & REHABILITATION

## 2021-12-09 PROCEDURE — 3209999900 FLUORO FOR SURGICAL PROCEDURES

## 2021-12-09 PROCEDURE — 3610000056 HC PAIN LEVEL 4 BASE (NON-OR): Performed by: PHYSICAL MEDICINE & REHABILITATION

## 2021-12-09 PROCEDURE — 6360000002 HC RX W HCPCS: Performed by: PHYSICAL MEDICINE & REHABILITATION

## 2021-12-09 PROCEDURE — 2709999900 HC NON-CHARGEABLE SUPPLY: Performed by: PHYSICAL MEDICINE & REHABILITATION

## 2021-12-09 PROCEDURE — 99152 MOD SED SAME PHYS/QHP 5/>YRS: CPT | Performed by: PHYSICAL MEDICINE & REHABILITATION

## 2021-12-09 RX ORDER — MIDAZOLAM HYDROCHLORIDE 1 MG/ML
INJECTION INTRAMUSCULAR; INTRAVENOUS
Status: COMPLETED | OUTPATIENT
Start: 2021-12-09 | End: 2021-12-09

## 2021-12-09 RX ORDER — LIDOCAINE HYDROCHLORIDE 10 MG/ML
INJECTION, SOLUTION EPIDURAL; INFILTRATION; INTRACAUDAL; PERINEURAL
Status: COMPLETED | OUTPATIENT
Start: 2021-12-09 | End: 2021-12-09

## 2021-12-09 RX ORDER — DEXAMETHASONE SODIUM PHOSPHATE 10 MG/ML
INJECTION, SOLUTION INTRAMUSCULAR; INTRAVENOUS
Status: COMPLETED | OUTPATIENT
Start: 2021-12-09 | End: 2021-12-09

## 2021-12-09 RX ORDER — FENTANYL CITRATE 50 UG/ML
INJECTION, SOLUTION INTRAMUSCULAR; INTRAVENOUS
Status: COMPLETED | OUTPATIENT
Start: 2021-12-09 | End: 2021-12-09

## 2021-12-09 ASSESSMENT — PAIN DESCRIPTION - DESCRIPTORS: DESCRIPTORS: ACHING;NUMBNESS

## 2021-12-09 ASSESSMENT — PAIN SCALES - GENERAL: PAINLEVEL_OUTOF10: 0

## 2021-12-09 NOTE — OP NOTE
PATIENT:  Diann Richards  AGE:  73 yrs  MEDICAL RECORD #:  [id number]  YOB: 1960     DATE:  12/9/2021  PHYSICIAN: Tess Galindo M.D. PROCEDURE: Left L4, L5 transforaminal epidural steroid injection under fluoroscopy. PRE-OP DIAGNOSIS:  Low Back Pain/Radiculopathy     POST-OP DIAGNOSIS:  same     HISTORY OF PRESENT ILLNESS:  See office notes. Patient has failed previous less-invasive treatments. ALLERGIES:  Peanut-containing drug products, Soybean-containing drug products, Ace inhibitors, Aspirin, Buckwheat, Cashew nut oil, Nsaids, and Other     MEDICATIONS:    No current facility-administered medications for this encounter. PHYSICAL EXAMINATION:              General:  Awake, alert              Heart:  No audible murmurs, extremities well perfused              Lungs:  No increased WOB or audible wheezing              Extremities:  Normal tone. Warm. No swelling. Anesthesia: 1 mg Versed and 50 mcg fentanyl    Estimated blood loss: None    DESCRIPTION OF PROCEDURE:     Components of the procedure were again reviewed with the patient prior to the procedure. She is aware of risks including infection, bleeding, allergic reaction, and nerve injury. She had ample opportunity for additional questions. She elected to proceed with treatment. The patient was placed in the prone position. Cardiovascular monitoring was initiated, and vital signs were stable prior to, during, and after the procedure. Utilizing fluoroscopy, the L4, L5 vertebrae were identified. The area was sterilely prepped and draped. Skin anesthesia was achieved at each entry site using 1-2 cc of Lidocaine 1%. A 22 g 5.0 inch spinal needle was slowly inserted into the left L4,5 neuroforamen using AP, lateral and oblique fluoroscopic imaging. Negative aspiration was confirmed. 1 cc Isovue-M 300 was injected at each site showing contrast spread into the epidural space and along the nerve root.   A combination of 1 cc Lidocaine 1% and 10 mg dexamethasone were slowly injected at each site. The needles were removed after the stylets were repositioned. A sterile bandage was applied. The patient was brought to recovery in stable condition. The patient tolerated the procedure well. DISPOSITION:  The patient was transported to recovery. The patient was monitored for 15 to 20 minutes post-procedure. Precautions were discussed and written instructions provided. Comment: Great epidural and NR flow at both sites.

## 2021-12-09 NOTE — H&P
HISTORY AND PHYSICAL/PRE-SEDATION ASSESSMENT    Patient:  Nakul Lira   :  1960  Medical Record No.:  [id number]   Date:  2021  Physician:  Thu Shultz MD  Facility: [address] [place] [address] Drive:                 The patient is a 57 y.o. female whom presents with leg pain. Review of the imaging and physical exam of the patient confirmed the pre-procedure diagnosis. After a thorough discussion of risks, benefits and alternatives informed consent was obtained. Diagnosis:  M54.16    Past Medical History:   Past Medical History:   Diagnosis Date    Aldosteronism ([address]     Asthma     HYPERCHOLESTERAEMIA     Hypertension     Psychogenic nonepileptic seizure         Past Surgical History:     Past Surgical History:   Procedure Laterality Date    CHOLECYSTECTOMY      COLONOSCOPY  2008    normal dr Shanel Umanzor, had previous polyp in     COLONOSCOPY  8/31/15    polyps, dr Jacy Llanes, repeat 3 years    COLONOSCOPY N/A 2018    COLONOSCOPY POLYPECTOMY SNARE/COLD BIOPSY performed by Rick Taylor MD at [id number] [place] [address]  2003    OTHER SURGICAL HISTORY  2013    uterine ablation    PAIN MANAGEMENT PROCEDURE Left 10/28/2021    LEFT L4 AND L5 TRANSFORAMINAL EPIDURAL STEROID INJECTION WITH FLUOROSCOPY ([id number], [phone]) performed by Thu Shultz MD at [facility]       Current Medications:   Prior to Admission medications    Medication Sig Start Date End Date Taking?  Authorizing Provider   amLODIPine (NORVASC) 10 MG tablet TAKE ONE TABLET BY MOUTH DAILY 21  Yes Oscar Yi MD   atorvastatin (LIPITOR) 20 MG tablet TAKE ONE TABLET BY MOUTH DAILY 21  Yes Oscar Yi MD   Fluticasone Furoate (ARNUITY ELLIPTA IN) Inhale 1 puff into the lungs daily Indications: pt is using 200    Yes Historical Provider, MD   Phenylephrine HCl (SUDAFED PE CONGESTION PO) Take 1 tablet by mouth as needed   Yes Historical Provider, MD   fluticasone (FLONASE) 50 MCG/ACT nasal spray 1 spray by Nasal route daily. Yes Historical Provider, MD   metoprolol (TOPROL-XL) 25 MG XL tablet Take 25 mg by mouth daily. Yes Historical Provider, MD   spironolactone (ALDACTONE) 25 MG tablet Take 25 mg by mouth 2 times daily. Yes Historical Provider, MD   levalbuterol Angie Saliva) 0.63 MG/3ML nebulization 1 ampule every 4 hours as needed    Yes Historical Provider, MD   levocetirizine (XYZAL) 5 MG tablet Take 5 mg by mouth nightly  6/24/10  Yes Historical Provider, MD   acetaminophen (TYLENOL) 325 MG tablet Take 650 mg by mouth every 6 hours as needed for Pain    Historical Provider, MD   EPINEPHrine (EPIPEN) 0.3 MG/0.3ML JO injection Inject 0.3 mg into the muscle as needed. Use as directed for allergic reaction    Historical Provider, MD       Allergies:  Peanut-containing drug products, Soybean-containing drug products, Ace inhibitors, Aspirin, Buckwheat, Cashew nut oil, Nsaids, and Other    Social History:    reports that she has never smoked. She has never used smokeless tobacco. She reports current alcohol use of about 0.8 standard drinks of alcohol per week. She reports that she does not use drugs. Family History:   Family History   Problem Relation Age of Onset    Heart Disease Father     High Blood Pressure Father     Cancer Father         BCC        Vitals: Blood pressure (!) 162/86, pulse 80, temperature 97.2 °F (36.2 °C), temperature source Temporal, resp. rate 20, height 5' 10\" (1.778 m), weight 200 lb (90.7 kg), SpO2 100 %. PHYSICAL EXAM:  HENT: Airway patent and reviewed  Cardiovascular: Normal rate, regular rhythm, normal heart sounds. Pulmonary/Chest: No wheezes. No rhonchi. No rales. Abdominal: Soft. Bowel sounds are normal. No distension. Extremities: Moves all extremities equally  Lumbar Spine: Painful range of motion, no midline tenderness     ASA CLASS:         []   I.  Normal, healthy adult           [x]   II.  Mild systemic disease            []   III. Severe systemic disease    Mallampati: Mallampati Class II - (soft palate, fauces & uvula are visible)      Sedation plan:   []  Local              [x]  Minimal                  []  General anesthesia    Treatment plan:  Patient's condition acceptable for planned procedure/sedation. Proceed with planned procedure   Post Procedure Plan   Return to same level of care   ______________________     The risks and benefits as well as alternatives to the procedure have been discussed with the patient and or family. The patient and/or next of kin understands and agrees to proceed.     Erica Boles MD

## 2022-02-01 NOTE — PROGRESS NOTES
PATIENT REACHED   YES_X___NO____    PREOP INSTRUCTIONS LEFT ON VM NUMBER_______________      DATE_2/3/22________ TIME_1530________ARRIVAL_1430_______PLACE_[address]___________  NOTHING TO EAT OR DRINK  AFTER MIDNIGHT THE EVENING PRIOR OR AS INSTRUCTED BY YOUR DR.  Lela Ellington NEED A RESPONSIBLE ADULT AGE 18 OR OLDER TO DRIVE YOU HOME  PLEASE BRING INSURANCE CARD. PICTURE ID AND COMPLETE LIST OF MEDS  WEAR LOOSE COMFORTABLE CLOTHING  FOLLOW ANY INSTRUCTIONS YOUR DRS OFFICE HAS GIVEN YOU,INCLUDING WHAT MEDICATIONS TO TAKE THE AM OF PROCEDURE AND WHEN AND IF YOU NEED TO STOP ANY BLOOD THINNERS. IF YOU HAVE QUESTIONS REGARDING THIS CALL THE OFFICE  THE GOAL BLOOD SUGAR THE AM OF PROCEDURE  OR LESS ABOVE THAT THE PROCEDURE MAY BE CANCELLED  ANY QUESTIONS CALL YOUR DOCTOR. ALSO,PLEASE READ THE INSTRUCTION PACKET FROM YOUR DR IF YOU RECEIVED ONE. SPINE INTERVENTION NUMBER [phone]      OTHER___________________________________      VISITOR POLICY(subject to change)      There is a one visitor policy at [facility] for all surgeries and endoscopies. Whether the visitor can stay or will be asked to wait in the car will depend on the current policy and if social distancing can be maintained. The policy is subject to change at any time. Please make sure the visitor has a cell phone that is on,charged and able to accept calls, as this may be the way that the staff communicates with them. Pain management is NO VISITOR policyThe patients ride is expected to remain in the car with a cell phone for communication. If the ride is leaving the hospital grounds please make sure they are back in time for pickup. Have the patient inform the staff on arrival what their rides plans are while the patient is in the facility. At the MAIN there is one visitor allowed. Please note that the visitor policy is subject to change.

## 2022-02-10 ENCOUNTER — HOSPITAL ENCOUNTER (OUTPATIENT)
Age: 62
Setting detail: OUTPATIENT SURGERY
Discharge: HOME | End: 2022-02-10
Attending: PHYSICAL MEDICINE & REHABILITATION | Admitting: PHYSICAL MEDICINE & REHABILITATION
Payer: COMMERCIAL

## 2022-02-10 ENCOUNTER — APPOINTMENT (OUTPATIENT)
Dept: GENERAL RADIOLOGY | Age: 62
End: 2022-02-10
Attending: PHYSICAL MEDICINE & REHABILITATION
Payer: COMMERCIAL

## 2022-02-10 VITALS
DIASTOLIC BLOOD PRESSURE: 83 MMHG | TEMPERATURE: 96.9 F | HEIGHT: 70 IN | SYSTOLIC BLOOD PRESSURE: 144 MMHG | WEIGHT: 201 LBS | HEART RATE: 79 BPM | RESPIRATION RATE: 14 BRPM | BODY MASS INDEX: 28.77 KG/M2 | OXYGEN SATURATION: 99 %

## 2022-02-10 PROCEDURE — 77003 FLUOROGUIDE FOR SPINE INJECT: CPT

## 2022-02-10 PROCEDURE — 2500000003 HC RX 250 WO HCPCS: Performed by: PHYSICAL MEDICINE & REHABILITATION

## 2022-02-10 PROCEDURE — 6360000002 HC RX W HCPCS: Performed by: PHYSICAL MEDICINE & REHABILITATION

## 2022-02-10 PROCEDURE — 2709999900 HC NON-CHARGEABLE SUPPLY: Performed by: PHYSICAL MEDICINE & REHABILITATION

## 2022-02-10 PROCEDURE — 3610000056 HC PAIN LEVEL 4 BASE (NON-OR): Performed by: PHYSICAL MEDICINE & REHABILITATION

## 2022-02-10 PROCEDURE — 99152 MOD SED SAME PHYS/QHP 5/>YRS: CPT | Performed by: PHYSICAL MEDICINE & REHABILITATION

## 2022-02-10 RX ORDER — FENTANYL CITRATE 50 UG/ML
INJECTION, SOLUTION INTRAMUSCULAR; INTRAVENOUS
Status: COMPLETED | OUTPATIENT
Start: 2022-02-10 | End: 2022-02-10

## 2022-02-10 RX ORDER — LIDOCAINE HYDROCHLORIDE 10 MG/ML
INJECTION, SOLUTION EPIDURAL; INFILTRATION; INTRACAUDAL; PERINEURAL
Status: COMPLETED | OUTPATIENT
Start: 2022-02-10 | End: 2022-02-10

## 2022-02-10 RX ORDER — DEXAMETHASONE SODIUM PHOSPHATE 10 MG/ML
INJECTION, SOLUTION INTRAMUSCULAR; INTRAVENOUS
Status: COMPLETED | OUTPATIENT
Start: 2022-02-10 | End: 2022-02-10

## 2022-02-10 RX ORDER — MIDAZOLAM HYDROCHLORIDE 1 MG/ML
INJECTION INTRAMUSCULAR; INTRAVENOUS
Status: COMPLETED | OUTPATIENT
Start: 2022-02-10 | End: 2022-02-10

## 2022-02-10 ASSESSMENT — PAIN SCALES - GENERAL
PAINLEVEL_OUTOF10: 1
PAINLEVEL_OUTOF10: 1

## 2022-02-10 ASSESSMENT — PAIN DESCRIPTION - DESCRIPTORS: DESCRIPTORS: TIGHTNESS;DISCOMFORT

## 2022-02-10 ASSESSMENT — PAIN - FUNCTIONAL ASSESSMENT
PAIN_FUNCTIONAL_ASSESSMENT: PREVENTS OR INTERFERES SOME ACTIVE ACTIVITIES AND ADLS
PAIN_FUNCTIONAL_ASSESSMENT: 0-10

## 2022-02-10 NOTE — OP NOTE
PATIENT:  Sonya Fraction  AGE:  66 yrs  MEDICAL RECORD #:  [id number]  YOB: 1960     DATE:  2/10/2022  PHYSICIAN: Holger Mcarthur M.D. PROCEDURE: Bilateral S1 transforaminal epidural steroid injection under fluoroscopy. PRE-OP DIAGNOSIS:  Low Back Pain/Radiculopathy     POST-OP DIAGNOSIS:  same     HISTORY OF PRESENT ILLNESS:  See office notes. Patient has failed previous less-invasive treatments. ALLERGIES:  Peanut-containing drug products, Soybean-containing drug products, Ace inhibitors, Aspirin, Buckwheat, Cashew nut oil, Covid-19 mrna vacc (moderna), Nsaids, and Other     MEDICATIONS:    No current facility-administered medications for this encounter. PHYSICAL EXAMINATION:              General:  Awake, alert              Heart:  No audible murmurs, extremities well perfused              Lungs:  No increased WOB or audible wheezing              Extremities:  Normal tone. Warm. No swelling. Anesthesia: 1 mg Versed and 50 mcg fentanyl    Estimated blood loss: None    DESCRIPTION OF PROCEDURE:     Components of the procedure were again reviewed with the patient prior to the procedure. She is aware of risks including infection, bleeding, allergic reaction, and nerve injury. She had ample opportunity for additional questions. She elected to proceed with treatment. The patient was placed in the prone position. Cardiovascular monitoring was initiated, and vital signs were stable prior to, during, and after the procedure. Utilizing fluoroscopy, the S1 vertebrae was identified. The area was sterilely prepped and draped. Skin anesthesia was achieved at each entry site using 1-2 cc of Lidocaine 1%. A 22 g 3.5 inch spinal needle was slowly inserted into the bilateral S1 neuroforamen using AP, lateral and oblique fluoroscopic imaging. Negative aspiration was confirmed.  1 cc Isovue-M 300 was injected at each site showing contrast spread into the epidural space and along the nerve root. A combination of 1 cc Lidocaine 1% and 10 mg dexamethasone were slowly injected at each site. The needles were removed after the stylets were repositioned. A sterile bandage was applied. The patient was brought to recovery in stable condition. The patient tolerated the procedure well. DISPOSITION:  The patient was transported to recovery. The patient was monitored for 15 to 20 minutes post-procedure. Precautions were discussed and written instructions provided.     Comment: None

## 2022-02-10 NOTE — H&P
HISTORY AND PHYSICAL/PRE-SEDATION ASSESSMENT    Patient:  Violeta Hubbard   :  1960  Medical Record No.:  [id number]   Date:  2/10/2022  Physician:  Norma López MD  Facility: [address] [place] [address] Drive:                 The patient is a 57 y.o. female whom presents with leg pain. Review of the imaging and physical exam of the patient confirmed the pre-procedure diagnosis. After a thorough discussion of risks, benefits and alternatives informed consent was obtained. Diagnosis:  M54.16 LUMBAR RADICUIOPATHY    Past Medical History:   Past Medical History:   Diagnosis Date    Aldosteronism ([address]     Asthma     HYPERCHOLESTERAEMIA     Hypertension     Psychogenic nonepileptic seizure         Past Surgical History:     Past Surgical History:   Procedure Laterality Date    CHOLECYSTECTOMY      COLONOSCOPY  2008    normal dr Jarad Morse, had previous polyp in     COLONOSCOPY  8/31/15    polyps, dr Lorena Moore, repeat 3 years    COLONOSCOPY N/A 2018    COLONOSCOPY POLYPECTOMY SNARE/COLD BIOPSY performed by De Eduardo MD at [address] [facility]  2003    OTHER SURGICAL HISTORY  2013    uterine ablation    PAIN MANAGEMENT PROCEDURE Left 10/28/2021    LEFT L4 AND L5 TRANSFORAMINAL EPIDURAL STEROID INJECTION WITH FLUOROSCOPY (09364, [phone]) performed by Norma López MD at [address] Left 2021    LEFT L4 AND L5 TRANSFORAMINAL EPIDURAL STEROID INJECTION WITH FLUOROSCOPY performed by Norma López MD at [facility]       Current Medications:   Prior to Admission medications    Medication Sig Start Date End Date Taking?  Authorizing Provider   amLODIPine (NORVASC) 10 MG tablet TAKE ONE TABLET BY MOUTH DAILY 21  Yes Renan Jean-Baptiste MD   atorvastatin (LIPITOR) 20 MG tablet TAKE ONE TABLET BY MOUTH DAILY 21  Yes Renan Jean-Baptiste MD   Fluticasone Furoate (ARNUITY ELLIPTA IN) Inhale 1 puff into the lungs daily Indications: pt is using 200    Yes Historical Provider, MD   acetaminophen (TYLENOL) 325 MG tablet Take 650 mg by mouth every 6 hours as needed for Pain   Yes Historical Provider, MD   fluticasone (FLONASE) 50 MCG/ACT nasal spray 1 spray by Nasal route daily. Yes Historical Provider, MD   metoprolol (TOPROL-XL) 25 MG XL tablet Take 25 mg by mouth daily. Yes Historical Provider, MD   spironolactone (ALDACTONE) 25 MG tablet Take 25 mg by mouth 2 times daily. Yes Historical Provider, MD   levocetirizine (XYZAL) 5 MG tablet Take 5 mg by mouth nightly  6/24/10  Yes Historical Provider, MD   Phenylephrine HCl (SUDAFED PE CONGESTION PO) Take 1 tablet by mouth as needed    Historical Provider, MD   EPINEPHrine (EPIPEN) 0.3 MG/0.3ML JO injection Inject 0.3 mg into the muscle as needed. Use as directed for allergic reaction    Historical Provider, MD   levalbuterol (XOPENEX) 0.63 MG/3ML nebulization 1 ampule every 4 hours as needed     Historical Provider, MD       Allergies:  Peanut-containing drug products, Soybean-containing drug products, Ace inhibitors, Aspirin, Buckwheat, Cashew nut oil, Covid-19 mrna vacc (moderna), Nsaids, and Other    Social History:    reports that she has never smoked. She has never used smokeless tobacco. She reports current alcohol use of about 0.8 standard drinks of alcohol per week. She reports that she does not use drugs. Family History:   Family History   Problem Relation Age of Onset    Heart Disease Father     High Blood Pressure Father     Cancer Father         BCC        Vitals: Blood pressure (!) 152/93, pulse 78, temperature 96.9 °F (36.1 °C), temperature source Temporal, resp. rate 16, height 5' 10\" (1.778 m), weight 201 lb (91.2 kg), last menstrual period 10/10/2013, SpO2 100 %. PHYSICAL EXAM:  HENT: Airway patent and reviewed  Cardiovascular: Normal rate, regular rhythm, normal heart sounds. Pulmonary/Chest: No wheezes. No rhonchi. No rales. Abdominal: Soft. Bowel sounds are normal. No distension. Extremities: Moves all extremities equally  Lumbar Spine: Painful range of motion, no midline tenderness     ASA CLASS:         []   I. Normal, healthy adult           [x]   II.  Mild systemic disease            []   III. Severe systemic disease    Mallampati: Mallampati Class II - (soft palate, fauces & uvula are visible)      Sedation plan:   []  Local              [x]  Minimal                  []  General anesthesia    Treatment plan:  Patient's condition acceptable for planned procedure/sedation. Proceed with planned procedure   Post Procedure Plan   Return to same level of care   ______________________     The risks and benefits as well as alternatives to the procedure have been discussed with the patient and or family. The patient and/or next of kin understands and agrees to proceed.     Lori Otero MD

## 2022-03-18 ENCOUNTER — OFFICE VISIT (OUTPATIENT)
Dept: FAMILY MEDICINE CLINIC | Age: 62
End: 2022-03-18
Payer: COMMERCIAL

## 2022-03-18 VITALS
OXYGEN SATURATION: 96 % | WEIGHT: 205 LBS | HEART RATE: 88 BPM | HEIGHT: 70 IN | SYSTOLIC BLOOD PRESSURE: 134 MMHG | RESPIRATION RATE: 18 BRPM | DIASTOLIC BLOOD PRESSURE: 88 MMHG | TEMPERATURE: 97.8 F | BODY MASS INDEX: 29.35 KG/M2

## 2022-03-18 DIAGNOSIS — I10 ESSENTIAL HYPERTENSION, BENIGN: Primary | ICD-10-CM

## 2022-03-18 DIAGNOSIS — E78.2 MIXED HYPERLIPIDEMIA: ICD-10-CM

## 2022-03-18 DIAGNOSIS — I10 ESSENTIAL HYPERTENSION, BENIGN: ICD-10-CM

## 2022-03-18 DIAGNOSIS — R73.09 ELEVATED GLUCOSE: ICD-10-CM

## 2022-03-18 LAB
A/G RATIO: 1.7 (ref 1.1–2.2)
ALBUMIN SERPL-MCNC: 4.7 G/DL (ref 3.4–5)
ALP BLD-CCNC: 132 U/L (ref 40–129)
ALT SERPL-CCNC: 19 U/L (ref 10–40)
ANION GAP SERPL CALCULATED.3IONS-SCNC: 15 MMOL/L (ref 3–16)
AST SERPL-CCNC: 19 U/L (ref 15–37)
BILIRUB SERPL-MCNC: 1.1 MG/DL (ref 0–1)
BUN BLDV-MCNC: 15 MG/DL (ref 7–20)
CALCIUM SERPL-MCNC: 9.7 MG/DL (ref 8.3–10.6)
CHLORIDE BLD-SCNC: 103 MMOL/L (ref 99–110)
CHOLESTEROL, TOTAL: 174 MG/DL (ref 0–199)
CO2: 21 MMOL/L (ref 21–32)
CREAT SERPL-MCNC: 0.9 MG/DL (ref 0.6–1.2)
GFR AFRICAN AMERICAN: >60
GFR NON-AFRICAN AMERICAN: >60
GLUCOSE BLD-MCNC: 121 MG/DL (ref 70–99)
HDLC SERPL-MCNC: 50 MG/DL (ref 40–60)
LDL CHOLESTEROL CALCULATED: 100 MG/DL
POTASSIUM SERPL-SCNC: 4.7 MMOL/L (ref 3.5–5.1)
SODIUM BLD-SCNC: 139 MMOL/L (ref 136–145)
TOTAL PROTEIN: 7.4 G/DL (ref 6.4–8.2)
TRIGL SERPL-MCNC: 120 MG/DL (ref 0–150)
VLDLC SERPL CALC-MCNC: 24 MG/DL

## 2022-03-18 PROCEDURE — 99214 OFFICE O/P EST MOD 30 MIN: CPT | Performed by: FAMILY MEDICINE

## 2022-03-18 ASSESSMENT — ENCOUNTER SYMPTOMS
NAUSEA: 0
ABDOMINAL DISTENTION: 0
CONSTIPATION: 0
ABDOMINAL PAIN: 0
SHORTNESS OF BREATH: 0
BLOOD IN STOOL: 0
VOMITING: 0
CHEST TIGHTNESS: 0
DIARRHEA: 0

## 2022-03-18 ASSESSMENT — PATIENT HEALTH QUESTIONNAIRE - PHQ9
SUM OF ALL RESPONSES TO PHQ9 QUESTIONS 1 & 2: 0
SUM OF ALL RESPONSES TO PHQ QUESTIONS 1-9: 0
1. LITTLE INTEREST OR PLEASURE IN DOING THINGS: 0
SUM OF ALL RESPONSES TO PHQ QUESTIONS 1-9: 0
2. FEELING DOWN, DEPRESSED OR HOPELESS: 0

## 2022-03-18 NOTE — PROGRESS NOTES
Subjective:      Patient ID: Connie Landaverde is a 57 y.o. female. Chief Complaint   Patient presents with    6 Month Follow-Up        Patient presents with:  6 Month Follow-Up    Here for the above  Overall well no change    meds the same     YOB: 1960    Date of Visit:  3/18/2022     -- Peanut-Containing Drug Products -- Rash   -- Soybean-Containing Drug Products -- Rash   -- Ace Inhibitors    -- Aspirin     --  hives   -- Buckwheat    -- Cashew Nut Oil     --  Cashew protein   -- Covid-19 Mrna Vacc (Moderna)    -- Nsaids    -- Other     --  Hazelnut, chick peas    Current Outpatient Medications:  amLODIPine (NORVASC) 10 MG tablet, TAKE ONE TABLET BY MOUTH DAILY, Disp: 30 tablet, Rfl: 5  atorvastatin (LIPITOR) 20 MG tablet, TAKE ONE TABLET BY MOUTH DAILY, Disp: 30 tablet, Rfl: 5  Fluticasone Furoate (ARNUITY ELLIPTA IN), Inhale 1 puff into the lungs daily Indications: pt is using 200 , Disp: , Rfl:   Phenylephrine HCl (SUDAFED PE CONGESTION PO), Take 1 tablet by mouth as needed, Disp: , Rfl:   acetaminophen (TYLENOL) 325 MG tablet, Take 650 mg by mouth every 6 hours as needed for Pain, Disp: , Rfl:   fluticasone (FLONASE) 50 MCG/ACT nasal spray, 1 spray by Nasal route daily. , Disp: , Rfl:   metoprolol (TOPROL-XL) 25 MG XL tablet, Take 25 mg by mouth daily. , Disp: , Rfl:   EPINEPHrine (EPIPEN) 0.3 MG/0.3ML JO injection, Inject 0.3 mg into the muscle as needed. Use as directed for allergic reaction, Disp: , Rfl:   spironolactone (ALDACTONE) 25 MG tablet, Take 25 mg by mouth 2 times daily. , Disp: , Rfl:   levalbuterol (XOPENEX) 0.63 MG/3ML nebulization, 1 ampule every 4 hours as needed , Disp: , Rfl:   levocetirizine (XYZAL) 5 MG tablet, Take 5 mg by mouth nightly , Disp: , Rfl:     No current facility-administered medications for this visit.      ---------------------------               03/18/22                      0830         ---------------------------   BP:          134/88 Site:    Left Upper Arm     Position:     Sitting        Cuff Size:  Medium Adult      Pulse:         88           Resp:          18           Temp:   97.8 °F (36.6 °C)   TempSrc:    Temporal        SpO2:          96%          Weight:  205 lb (93 kg)     Height: 5' 10\" (1.778 m)   ---------------------------  Body mass index is 29.41 kg/m². Wt Readings from Last 3 Encounters:  03/18/22 : 205 lb (93 kg)  02/10/22 : 201 lb (91.2 kg)  12/09/21 : 200 lb (90.7 kg)    BP Readings from Last 3 Encounters:  03/18/22 : 134/88  02/10/22 : (!) 144/83  12/09/21 : 137/69         Review of Systems   Constitutional: Negative for appetite change, chills, fever and unexpected weight change. Respiratory: Negative for chest tightness and shortness of breath. Cardiovascular: Negative for chest pain, palpitations and leg swelling. Gastrointestinal: Negative for abdominal distention, abdominal pain, blood in stool, constipation, diarrhea, nausea and vomiting. Genitourinary: Negative for difficulty urinating, dysuria and hematuria. Neurological: Negative for dizziness, syncope and headaches. Objective:   Physical Exam  Constitutional:       General: She is not in acute distress. Appearance: Normal appearance. She is well-developed. She is not ill-appearing or diaphoretic. Eyes:      General: No scleral icterus. Neck:      Thyroid: No thyroid mass or thyromegaly. Cardiovascular:      Rate and Rhythm: Normal rate and regular rhythm. Heart sounds: Normal heart sounds. No murmur heard. No friction rub. No gallop. Comments:     Pulmonary:      Effort: Pulmonary effort is normal. No tachypnea, accessory muscle usage or respiratory distress. Breath sounds: Normal breath sounds. No decreased breath sounds, wheezing, rhonchi or rales. Chest:   Breasts:      Right: No supraclavicular adenopathy. Left: No supraclavicular adenopathy.        Abdominal: General: Bowel sounds are normal. There is no distension or abdominal bruit. Palpations: Abdomen is soft. There is no hepatomegaly, splenomegaly, mass or pulsatile mass. Tenderness: There is no abdominal tenderness. There is no guarding. Musculoskeletal:      Cervical back: Neck supple. Lymphadenopathy:      Cervical: No cervical adenopathy. Upper Body:      Right upper body: No supraclavicular adenopathy. Left upper body: No supraclavicular adenopathy. Skin:     General: Skin is warm and dry. Coloration: Skin is not pale. Nails: There is no clubbing. Neurological:      General: No focal deficit present. Mental Status: She is alert. Assessment:        Diagnosis Orders   1. Essential hypertension, benign  Comprehensive Metabolic Panel    Lipid Panel   2. Mixed hyperlipidemia  Comprehensive Metabolic Panel    Lipid Panel   3.  Elevated glucose  Hemoglobin A1C       Interval Hx:  She has had epidural injections lower back since last visit  Allergy note reviewed from 1/12/22 for her allergic rhinitis pollen and her mild persistent asthma      Plan:      Do continue diet   Stay with the medicines  Consider the shingle vaccine  See us in 6 months         Keshav Dutton MD

## 2022-03-19 LAB
ESTIMATED AVERAGE GLUCOSE: 134.1 MG/DL
HBA1C MFR BLD: 6.3 %

## 2022-03-25 ENCOUNTER — HOSPITAL ENCOUNTER (OUTPATIENT)
Dept: PHYSICAL THERAPY | Age: 62
Setting detail: THERAPIES SERIES
Discharge: HOME | End: 2022-03-25
Payer: COMMERCIAL

## 2022-03-25 PROCEDURE — 97110 THERAPEUTIC EXERCISES: CPT

## 2022-03-25 PROCEDURE — 97161 PT EVAL LOW COMPLEX 20 MIN: CPT
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
## 2022-03-28 ENCOUNTER — HOSPITAL ENCOUNTER (OUTPATIENT)
Dept: PHYSICAL THERAPY | Age: 62
Setting detail: THERAPIES SERIES
Discharge: HOME | End: 2022-03-28
Payer: COMMERCIAL

## 2022-03-28 ENCOUNTER — APPOINTMENT (OUTPATIENT)
Dept: PHYSICAL THERAPY | Age: 62
End: 2022-03-28
Payer: COMMERCIAL

## 2022-03-28 PROCEDURE — 97110 THERAPEUTIC EXERCISES: CPT

## 2022-03-28 PROCEDURE — 97140 MANUAL THERAPY 1/> REGIONS: CPT
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
## 2022-04-05 ENCOUNTER — HOSPITAL ENCOUNTER (OUTPATIENT)
Dept: PHYSICAL THERAPY | Age: 62
Setting detail: THERAPIES SERIES
Discharge: HOME | End: 2022-04-05
Payer: COMMERCIAL

## 2022-04-05 PROCEDURE — 97530 THERAPEUTIC ACTIVITIES: CPT

## 2022-04-05 PROCEDURE — 97110 THERAPEUTIC EXERCISES: CPT

## 2022-04-05 PROCEDURE — 97140 MANUAL THERAPY 1/> REGIONS: CPT
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
## 2022-04-07 ENCOUNTER — APPOINTMENT (OUTPATIENT)
Dept: PHYSICAL THERAPY | Age: 62
End: 2022-04-07
Payer: COMMERCIAL

## 2022-04-11 ENCOUNTER — HOSPITAL ENCOUNTER (OUTPATIENT)
Dept: PHYSICAL THERAPY | Age: 62
Setting detail: THERAPIES SERIES
Discharge: HOME | End: 2022-04-11
Payer: COMMERCIAL

## 2022-04-11 PROCEDURE — 97110 THERAPEUTIC EXERCISES: CPT

## 2022-04-11 PROCEDURE — 97140 MANUAL THERAPY 1/> REGIONS: CPT

## 2022-04-11 PROCEDURE — 97530 THERAPEUTIC ACTIVITIES: CPT
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
## 2022-04-13 ENCOUNTER — HOSPITAL ENCOUNTER (OUTPATIENT)
Dept: PHYSICAL THERAPY | Age: 62
Setting detail: THERAPIES SERIES
Discharge: HOME | End: 2022-04-13
Payer: COMMERCIAL

## 2022-04-13 PROCEDURE — 97110 THERAPEUTIC EXERCISES: CPT

## 2022-04-13 PROCEDURE — 97140 MANUAL THERAPY 1/> REGIONS: CPT

## 2022-04-13 PROCEDURE — 97530 THERAPEUTIC ACTIVITIES: CPT
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
## 2022-04-18 ENCOUNTER — HOSPITAL ENCOUNTER (OUTPATIENT)
Dept: PHYSICAL THERAPY | Age: 62
Setting detail: THERAPIES SERIES
Discharge: HOME | End: 2022-04-18
Payer: COMMERCIAL

## 2022-04-18 PROCEDURE — 97110 THERAPEUTIC EXERCISES: CPT

## 2022-04-18 PROCEDURE — 97140 MANUAL THERAPY 1/> REGIONS: CPT

## 2022-04-18 PROCEDURE — 97530 THERAPEUTIC ACTIVITIES: CPT
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
## 2022-04-20 ENCOUNTER — HOSPITAL ENCOUNTER (OUTPATIENT)
Dept: PHYSICAL THERAPY | Age: 62
Setting detail: THERAPIES SERIES
Discharge: HOME | End: 2022-04-20
Payer: COMMERCIAL

## 2022-04-20 PROCEDURE — 97110 THERAPEUTIC EXERCISES: CPT

## 2022-04-20 PROCEDURE — 97530 THERAPEUTIC ACTIVITIES: CPT

## 2022-04-20 PROCEDURE — 97140 MANUAL THERAPY 1/> REGIONS: CPT
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
## 2022-04-26 ENCOUNTER — HOSPITAL ENCOUNTER (OUTPATIENT)
Dept: PHYSICAL THERAPY | Age: 62
Setting detail: THERAPIES SERIES
Discharge: HOME | End: 2022-04-26
Payer: COMMERCIAL

## 2022-04-26 PROCEDURE — 97530 THERAPEUTIC ACTIVITIES: CPT

## 2022-04-26 PROCEDURE — 97140 MANUAL THERAPY 1/> REGIONS: CPT

## 2022-04-26 PROCEDURE — 97110 THERAPEUTIC EXERCISES: CPT

## 2022-04-26 NOTE — FLOWSHEET NOTE
[place] [address]. [place] [address]  Phone: [phone]   Fax:     [phone]    Physical Therapy Treatment Note/ Progress Report:     Date:  2022    Patient Name:  Jef Ochoa    :  1960  MRN: [id number]    Pertinent Medical History:      Medical/Treatment Diagnosis Information:  · Diagnosis: Lumbar radiculopathy (M54.16)  · Treatment Diagnosis: Decreased ADL status (E01.9)    Insurance/Certification information:  PT Insurance Information: [employer]- approved for 10 PT visits thru 22 after initial PT evaluation  Physician Information:  Referring Practitioner: Leigh Gutierrez CNP  Plan of care signed (Y/N): Y    Date of Patient follow up with Physician:      Progress Report: []  Yes  [x]  No     Date Range for reporting period:  Beginning: 3/25/22  Ending:    Progress report due (10 Rx/or 30 days whichever is less):     Recertification due (POC duration/ or 90 days whichever is less):    Visit # POC/ Insurance Allowable Auth Needed   8 eval + 10 [x]Yes    []No     Pain level:  6/10   Functional Scale: 3/25/22 Modified Oswestry = 58; FOTO Lumbar 34; 22 Modified Oswestry 23.7, FOTO Lumbar 56    History of Injury:Patient stated she had injections for her back pain, most recently in 2021 and 2022 which helped to reduced pain. Currently pain is located at the lumbosacral region (rated 4-5/10, before injections was 10/10) and the mid thoracic region (rated 1-8/10), and her left leg feels weak and she feels off balance at times. Pt stated \"if I step on it wrong its not steady\" and occurs approximately 1x/week. Pt is taking muscle relaxer currently which helps to reduce pain. \"More than anything I just feel like I need to get my strength back. \"  Pt noted weakness when playing on the floor when playing with the grand children, getting off of the hip extension 10x, 5\" hold, L/R    Prone piriformis isometric  Prone hip IR/ER AROM   10x  15x L/R         Manual Intervention (14084) Min: 15     STM R thoracic paraspinals    Mob T8/9 central PA grade IV    NMR re-education (79129)   Min:                         Therapeutic Activity (70418) Min:     T-slide hip   ABD   Extension   Green, 2x15, L/R  Green, 2x15, L/R         [facility]- flat up  [employer] lateral   20x  20x         Pivoting t-slide L/R green, 20x          T-slide reverse butterfly Pink, 2x10    T-slide PNF D2 Pink, 2x10    Lat pull  90# 2x15    Row 45# 2x15    Modalities  Min:             Other Therapeutic Activities:  Pt was educated on PT POC, Diagnosis, Prognosis, pathomechanics as well as frequency and duration of scheduling future physical therapy appointments. Time was also taken on this day to answer all patient questions and participation in PT. Reviewed appointment policy in detail with patient and patient verbalized understanding. Home Exercise Program:  Access Code: [email or web address]  URL: [email or web address]  Date: 03/25/2022  Prepared by: Marilynn Hawley  Cat-Camel - 1 x daily - 7 x weekly - 10 reps  Prone Scapular Slide with Shoulder Extension - 1 x daily - 7 x weekly - 10 reps - 5 hold  Prone Scapular Retraction Arms at Side - 1 x daily - 7 x weekly - 10 reps - 5 hold  Prone Hip Extension - Two Pillows - 1 x daily - 7 x weekly - 10 reps - 5 hold  Hip Flexor Stretch at Edge of Bed - 1 x daily - 7 x weekly - 2 reps - 30 hold    Therapeutic Exercise and NMR EXR  [] (12995) Provided verbal/tactile cueing for activities related to strengthening, flexibility, endurance, ROM  for improvements in proximal hip and core control with self care, mobility, lifting and ambulation.  [] (66602) Provided verbal/tactile cueing for activities related to improving balance, coordination, kinesthetic sense, posture, motor skill, proprioception  to assist with core control in self care, mobility, lifting, and ambulation. Therapeutic Activities:    [] (62449 or 69198) Provided verbal/tactile cueing for activities related to improving balance, coordination, kinesthetic sense, posture, motor skill, proprioception and motor activation to allow for proper function  with self care and ADLs  [] (29244) Provided training and instruction to the patient for proper core and proximal hip recruitment and positioning with ambulation re-education     Home Exercise Program:    [] (17580) Reviewed/Progressed HEP activities related to strengthening, flexibility, endurance, ROM of core, proximal hip and LE for functional self-care, mobility, lifting and ambulation   [] (62335) Reviewed/Progressed HEP activities related to improving balance, coordination, kinesthetic sense, posture, motor skill, proprioception of core, proximal hip and LE for self care, mobility, lifting, and ambulation      Manual Treatments:  PROM / STM / Oscillations-Mobs:  G-I, II, III, IV (PA's, Inf., Post.)  [] (87809) Provided manual therapy to mobilize proximal hip and LS spine soft tissue/joints for the purpose of modulating pain, promoting relaxation,  increasing ROM, reducing/eliminating soft tissue swelling/inflammation/restriction, improving soft tissue extensibility and allowing for proper ROM for normal function with self care, mobility, lifting and ambulation.        Approval Dates:  CPT Code Units Approved Units Used  Date Updated:                     Charges:  Timed Code Treatment Minutes: 45   Total Treatment Minutes: 45     [] EVAL (LOW) 81893 (typically 20 minutes face-to-face)  [] EVAL (MOD) 08552 (typically 30 minutes face-to-face)  [] EVAL (HIGH) 31556 (typically 45 minutes face-to-face)  [] RE-EVAL     [x] OE(13669) x    [] Dry needle 1 or 2 Muscles (56087)  [] NMR (33212) x     [] Dry needle 3+ Muscles (03822)  [x] Manual (92532) x     [] Ultrasound (72492) x  [x] TA (96362) x     [] Mech Traction (94817)  [] ES(attended) (99074)     [] ES (un) (90655):   [] Vasopump (76708) [] Ionto (91010)   [] Other:    GOALS:  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 Progressing: []? Met: []? Not Met: []? Adjusted  Therapist goals for Patient:   Short Term Goals: To be achieved in: 2 weeks  1. Independent in HEP and progression per patient tolerance, in order to prevent re-injury. []? Progressing: []? Met: []? Not Met: []? Adjusted  2. Patient will have a decrease in pain to facilitate improvement in movement, function, and ADLs as indicated by Functional Deficits. []? Progressing: []? Met: []? Not Met: []? Adjusted     Long Term Goals: To be achieved in: 6 weeks  1. Pt will improve to 50 on the FOTO Lumbar scale to assist with reaching prior level of function. []? Progressing: []? Met: []? Not Met: []? Adjusted  2. Patient will demonstrate increased AROM to WNL, good LS mobility, good hip ROM to  items from the ground and low shelves without aggravating pain. []? Progressing: []? Met: []? Not Met: []? Adjusted  3. Patient will demonstrate an increase in Strength to good proximal hip and core activation to allow for proper functional mobility as indicated by patients Functional Deficits. []? Progressing: []? Met: []? Not Met: []? Adjusted  4. Patient will return to cooking a meal without increased symptoms or restriction. []? Progressing: []? Met: []? Not Met: []? Adjusted  5. Patient will be able to get up and down from the floor without aggravating back pain. []? Progressing: []? Met: []? Not Met: []? Adjusted         ASSESSMENT:  See eval    Treatment/Activity Tolerance:  [x] Patient tolerated treatment well [] Patient limited by fatique  [] Patient limited by pain  [] Patient limited by other medical complications  [] Other:     Overall Progression Towards Functional goals/ Treatment Progress Update:  [] Patient is progressing as expected towards functional goals listed.     [] Progression is slowed due to complexities/Impairments listed. [] Progression has been slowed due to co-morbidities. [x] Plan just implemented, too soon to assess goals progression <30days   [] Goals require adjustment due to lack of progress  [] Patient is not progressing as expected and requires additional follow up with physician  [] Other:    Prognosis for POC: [x] Good [] Fair  [] Poor    Patient requires continued skilled intervention: [x] Yes  [] No        PLAN:   [x] Continue per plan of care [] Alter current plan (see comments)  [] Plan of care initiated [] Hold pending MD visit [] Discharge    Electronically signed by: Alexandro George PT , OMT-C, [id number]      Note: If patient does not return for scheduled/recommended follow up visits, this note will serve as a discharge from care along with the most recent update on progress.

## 2022-04-28 ENCOUNTER — HOSPITAL ENCOUNTER (OUTPATIENT)
Dept: PHYSICAL THERAPY | Age: 62
Setting detail: THERAPIES SERIES
Discharge: HOME | End: 2022-04-28
Payer: COMMERCIAL

## 2022-04-28 PROCEDURE — 97140 MANUAL THERAPY 1/> REGIONS: CPT

## 2022-04-28 PROCEDURE — 97112 NEUROMUSCULAR REEDUCATION: CPT

## 2022-04-28 PROCEDURE — 97110 THERAPEUTIC EXERCISES: CPT
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
## 2022-05-02 ENCOUNTER — APPOINTMENT (OUTPATIENT)
Dept: PHYSICAL THERAPY | Age: 62
End: 2022-05-02
Payer: COMMERCIAL

## 2022-05-04 ENCOUNTER — HOSPITAL ENCOUNTER (OUTPATIENT)
Dept: PHYSICAL THERAPY | Age: 62
Setting detail: THERAPIES SERIES
Discharge: HOME | End: 2022-05-04
Payer: COMMERCIAL

## 2022-05-04 PROCEDURE — 97110 THERAPEUTIC EXERCISES: CPT

## 2022-05-04 PROCEDURE — 97530 THERAPEUTIC ACTIVITIES: CPT

## 2022-05-04 PROCEDURE — 97140 MANUAL THERAPY 1/> REGIONS: CPT
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
## 2022-05-06 ENCOUNTER — HOSPITAL ENCOUNTER (OUTPATIENT)
Dept: PHYSICAL THERAPY | Age: 62
Setting detail: THERAPIES SERIES
Discharge: HOME | End: 2022-05-06
Payer: COMMERCIAL

## 2022-05-06 PROCEDURE — 97530 THERAPEUTIC ACTIVITIES: CPT

## 2022-05-06 PROCEDURE — 97140 MANUAL THERAPY 1/> REGIONS: CPT

## 2022-05-06 PROCEDURE — 97110 THERAPEUTIC EXERCISES: CPT
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
## 2022-05-31 RX ORDER — ATORVASTATIN CALCIUM 20 MG/1
TABLET, FILM COATED ORAL
Qty: 30 TABLET | Refills: 5 | Status: SHIPPED | OUTPATIENT
Start: 2022-05-31

## 2022-05-31 RX ORDER — AMLODIPINE BESYLATE 10 MG/1
TABLET ORAL
Qty: 30 TABLET | Refills: 5 | Status: SHIPPED | OUTPATIENT
Start: 2022-05-31

## 2022-08-31 ENCOUNTER — APPOINTMENT (OUTPATIENT)
Dept: GENERAL RADIOLOGY | Age: 62
End: 2022-08-31
Attending: PHYSICAL MEDICINE & REHABILITATION
Payer: COMMERCIAL

## 2022-08-31 ENCOUNTER — HOSPITAL ENCOUNTER (OUTPATIENT)
Age: 62
Setting detail: OUTPATIENT SURGERY
Discharge: HOME HEALTH | End: 2022-08-31
Attending: PHYSICAL MEDICINE & REHABILITATION | Admitting: PHYSICAL MEDICINE & REHABILITATION
Payer: COMMERCIAL

## 2022-08-31 VITALS
HEART RATE: 71 BPM | OXYGEN SATURATION: 99 % | HEIGHT: 70 IN | TEMPERATURE: 97.8 F | WEIGHT: 198 LBS | DIASTOLIC BLOOD PRESSURE: 77 MMHG | SYSTOLIC BLOOD PRESSURE: 143 MMHG | BODY MASS INDEX: 28.35 KG/M2 | RESPIRATION RATE: 18 BRPM

## 2022-08-31 PROCEDURE — 3610000057 HC PAIN LEVEL 4 ADDL 15 MIN (NON-OR): Performed by: PHYSICAL MEDICINE & REHABILITATION

## 2022-08-31 PROCEDURE — 2500000003 HC RX 250 WO HCPCS: Performed by: PHYSICAL MEDICINE & REHABILITATION

## 2022-08-31 PROCEDURE — 2709999900 HC NON-CHARGEABLE SUPPLY: Performed by: PHYSICAL MEDICINE & REHABILITATION

## 2022-08-31 PROCEDURE — 99152 MOD SED SAME PHYS/QHP 5/>YRS: CPT | Performed by: PHYSICAL MEDICINE & REHABILITATION

## 2022-08-31 PROCEDURE — 3209999900 FLUORO FOR SURGICAL PROCEDURES

## 2022-08-31 PROCEDURE — 3610000056 HC PAIN LEVEL 4 BASE (NON-OR): Performed by: PHYSICAL MEDICINE & REHABILITATION

## 2022-08-31 PROCEDURE — 6360000002 HC RX W HCPCS: Performed by: PHYSICAL MEDICINE & REHABILITATION

## 2022-08-31 RX ORDER — DEXAMETHASONE SODIUM PHOSPHATE 10 MG/ML
INJECTION, SOLUTION INTRAMUSCULAR; INTRAVENOUS
Status: COMPLETED | OUTPATIENT
Start: 2022-08-31 | End: 2022-08-31

## 2022-08-31 RX ORDER — FENTANYL CITRATE 50 UG/ML
INJECTION, SOLUTION INTRAMUSCULAR; INTRAVENOUS
Status: COMPLETED | OUTPATIENT
Start: 2022-08-31 | End: 2022-08-31

## 2022-08-31 RX ORDER — MIDAZOLAM HYDROCHLORIDE 1 MG/ML
INJECTION INTRAMUSCULAR; INTRAVENOUS
Status: COMPLETED | OUTPATIENT
Start: 2022-08-31 | End: 2022-08-31

## 2022-08-31 RX ORDER — LIDOCAINE HYDROCHLORIDE 10 MG/ML
INJECTION, SOLUTION EPIDURAL; INFILTRATION; INTRACAUDAL; PERINEURAL
Status: COMPLETED | OUTPATIENT
Start: 2022-08-31 | End: 2022-08-31

## 2022-08-31 ASSESSMENT — PAIN - FUNCTIONAL ASSESSMENT: PAIN_FUNCTIONAL_ASSESSMENT: 0-10

## 2022-08-31 ASSESSMENT — PAIN DESCRIPTION - LOCATION: LOCATION: BACK

## 2022-08-31 ASSESSMENT — PAIN DESCRIPTION - PAIN TYPE: TYPE: CHRONIC PAIN

## 2022-08-31 ASSESSMENT — PAIN SCALES - GENERAL: PAINLEVEL_OUTOF10: 0

## 2022-08-31 NOTE — H&P
HISTORY AND PHYSICAL/PRE-SEDATION ASSESSMENT    Patient:  Lizett Porter   :  1960  Medical Record No.:  [id number]   Date:  2022  Physician:  Ty Giang MD  Facility: [address] [place] [address] Drive:                 The patient is a 57 y.o. female whom presents with leg pain. Review of the imaging and physical exam of the patient confirmed the pre-procedure diagnosis. After a thorough discussion of risks, benefits and alternatives informed consent was obtained. Diagnosis:  Lumbar radiculopathy [M54.16]    Past Medical History:   Past Medical History:   Diagnosis Date    Aldosteronism ([address]     Asthma     HYPERCHOLESTERAEMIA     Hypertension     Psychogenic nonepileptic seizure         Past Surgical History:     Past Surgical History:   Procedure Laterality Date    CHOLECYSTECTOMY      COLONOSCOPY  2008    normal dr Shayy Hall, had previous polyp in     COLONOSCOPY  8/31/15    polyps, dr Eleni Bocanegra, repeat 3 years    COLONOSCOPY N/A 2018    COLONOSCOPY POLYPECTOMY SNARE/COLD BIOPSY performed by Lynnann Spurling, MD at [address] [place] [address] Nw  2003    OTHER SURGICAL HISTORY  2013    uterine ablation    PAIN MANAGEMENT PROCEDURE Left 10/28/2021    LEFT L4 AND L5 TRANSFORAMINAL EPIDURAL STEROID INJECTION WITH FLUOROSCOPY (10258, 47341) performed by Ty Giang MD at [address] [place] [address] Left 2021    LEFT L4 AND L5 TRANSFORAMINAL EPIDURAL STEROID INJECTION WITH FLUOROSCOPY performed by Ty Giang MD at [address] [place] [address] Bilateral 2/10/2022    BILATERAL S-1 TRANSFORAMINAL EPIDURAL STEROID INJECTION WITH FLUOROSCOPY performed by Ty Giang MD at [facility]       Current Medications:   Prior to Admission medications    Medication Sig Start Date End Date Taking?  Authorizing Provider   atorvastatin (LIPITOR) 20 MG tablet TAKE ONE TABLET BY MOUTH DAILY 22   Lacey Stacy MD   amLODIPine (NORVASC) 10 MG tablet TAKE ONE TABLET BY MOUTH DAILY 5/31/22   Gene Bowen MD   Fluticasone Furoate (ARNUITY ELLIPTA IN) Inhale 1 puff into the lungs daily Indications: pt is using 200     Historical Provider, MD   Phenylephrine HCl (SUDAFED PE CONGESTION PO) Take 1 tablet by mouth as needed    Historical Provider, MD   acetaminophen (TYLENOL) 325 MG tablet Take 650 mg by mouth every 6 hours as needed for Pain    Historical Provider, MD   fluticasone (FLONASE) 50 MCG/ACT nasal spray 1 spray by Nasal route daily. Historical Provider, MD   metoprolol (TOPROL-XL) 25 MG XL tablet Take 25 mg by mouth daily. Historical Provider, MD   EPINEPHrine (EPIPEN) 0.3 MG/0.3ML JO injection Inject 0.3 mg into the muscle as needed. Use as directed for allergic reaction    Historical Provider, MD   spironolactone (ALDACTONE) 25 MG tablet Take 25 mg by mouth 2 times daily. Historical Provider, MD   levalbuterol Orbie Pastel) 0.63 MG/3ML nebulization 1 ampule every 4 hours as needed     Historical Provider, MD   levocetirizine (XYZAL) 5 MG tablet Take 5 mg by mouth nightly  6/24/10   Historical Provider, MD       Allergies:  Peanut-containing drug products, Soybean-containing drug products, Ace inhibitors, Aspirin, Buckwheat, Cashew nut oil, Covid-19 mrna vacc (moderna), Nsaids, and Other    Social History:    reports that she has never smoked. She has never used smokeless tobacco. She reports current alcohol use of about 0.8 standard drinks per week. She reports that she does not use drugs. Family History:   Family History   Problem Relation Age of Onset    Heart Disease Father     High Blood Pressure Father     Cancer Father         [facility]        Vitals: Blood pressure (!) 142/72, pulse 74, temperature 97.8 °F (36.6 °C), temperature source Temporal, resp. rate 16, height 5' 10\" (1.778 m), weight 198 lb (89.8 kg), last menstrual period 10/10/2013, SpO2 98 %, not currently breastfeeding.     PHYSICAL EXAM:  HENT: Airway patent and reviewed  Cardiovascular: Normal rate, regular rhythm, normal heart sounds. Pulmonary/Chest: No wheezes. No rhonchi. No rales. Abdominal: Soft. Bowel sounds are normal. No distension. Extremities: Moves all extremities equally  Lumbar Spine: Painful range of motion, no midline tenderness     ASA CLASS:         []   I. Normal, healthy adult           [x]   II.  Mild systemic disease            []   III. Severe systemic disease    Mallampati: Mallampati Class II - (soft palate, fauces & uvula are visible)      Sedation plan:   []  Local              [x]  Minimal                  []  General anesthesia    Treatment plan:  Patient's condition acceptable for planned procedure/sedation. Proceed with planned procedure   Post Procedure Plan   Return to same level of care   ______________________     The risks and benefits as well as alternatives to the procedure have been discussed with the patient and or family. The patient and/or next of kin understands and agrees to proceed.     Emigdio Sena MD

## 2022-08-31 NOTE — DISCHARGE INSTRUCTIONS
DR. Bell An DISCHARGE INSTRUCTIONS           1. Do not drive today for ( 8 hours with no sedation)  (24 hours if had sedation)     2. If you received sedation during your procedure, be advised for the next 24 hour       Do not drink alcohol    Do not operate machinery    Do not make any important decisions or sign any legal documents    You may experience light headedness,dizziness or sleepiness     3. You may apply ice for 15 minutes 4-5 times a day as needed. 4. No heating pad for 48 hours     5. Follow up with the Provider who referred you. 6.Keep site dry for 24 hours after procedure, then remove bandaid. 7.It may take 24-48 hours to feel improvement. 8. Side effects of Steroids may include:      Elevated glucose levels ( in diabetics)    Fluid retention    Tenderness at site    Nervous energy    Sleeplessness    Muscle spasms    Facial flushing    Hot flashes     9. Call if:    Your symptoms worsen severely    You experience a severe headache that worsens when upright    You develop a fever greater than 101 degrees     ([phone])                             10. You may restart any blood thinning medications tomorrow.

## 2022-08-31 NOTE — OP NOTE
PATIENT:  Kimberly De La Torre  AGE:  2 yrs  MEDICAL RECORD #:  [id number]  YOB: 1960     DATE:  8/31/2022  PHYSICIAN: Abigail Pichardo M.D. PROCEDURE: Bilateral S1 transforaminal epidural steroid injection under fluoroscopy. PRE-OP DIAGNOSIS:  Low Back Pain/Radiculopathy     POST-OP DIAGNOSIS:  same     HISTORY OF PRESENT ILLNESS:  See office notes. Patient has failed previous less-invasive treatments. ALLERGIES:  Peanut-containing drug products, Soybean-containing drug products, Ace inhibitors, Aspirin, Buckwheat, Cashew nut oil, Covid-19 mrna vacc (moderna), Nsaids, and Other     MEDICATIONS:    No current facility-administered medications for this encounter. PHYSICAL EXAMINATION:              General:  Awake, alert              Heart:  No audible murmurs, extremities well perfused              Lungs:  No increased WOB or audible wheezing              Extremities:  Normal tone. Warm. No swelling. Anesthesia: 1 mg Versed and 50 mcg fentanyl    Estimated blood loss: None    DESCRIPTION OF PROCEDURE:     Components of the procedure were again reviewed with the patient prior to the procedure. She is aware of risks including infection, bleeding, allergic reaction, and nerve injury. She had ample opportunity for additional questions. She elected to proceed with treatment. The patient was placed in the prone position. Cardiovascular monitoring was initiated, and vital signs were stable prior to, during, and after the procedure. Utilizing fluoroscopy, the S1 vertebrae was identified. The area was sterilely prepped and draped. Skin anesthesia was achieved at each entry site using 1-2 cc of Lidocaine 1%. A 22 g 3.5 inch spinal needle was slowly inserted into the bilateral S1 neuroforamen using AP, lateral and oblique fluoroscopic imaging. Negative aspiration was confirmed.  1 cc Isovue-M 300 was injected at each site showing contrast spread into the epidural space and along the nerve root. A combination of 1 cc Lidocaine 1% and 10 mg dexamethasone were slowly injected at each site. The needles were removed after the stylets were repositioned. A sterile bandage was applied. The patient was brought to recovery in stable condition. The patient tolerated the procedure well. DISPOSITION:  The patient was transported to recovery. The patient was monitored for 15 to 20 minutes post-procedure. Precautions were discussed and written instructions provided. Comment: Great epidural and NR flow.

## 2022-09-02 ENCOUNTER — OFFICE VISIT (OUTPATIENT)
Dept: FAMILY MEDICINE CLINIC | Age: 62
End: 2022-09-02
Payer: COMMERCIAL

## 2022-09-02 VITALS
BODY MASS INDEX: 28.77 KG/M2 | DIASTOLIC BLOOD PRESSURE: 78 MMHG | WEIGHT: 201 LBS | TEMPERATURE: 97.5 F | HEIGHT: 70 IN | SYSTOLIC BLOOD PRESSURE: 118 MMHG

## 2022-09-02 DIAGNOSIS — Z12.83 SKIN CANCER SCREENING: ICD-10-CM

## 2022-09-02 DIAGNOSIS — I10 ESSENTIAL HYPERTENSION, BENIGN: Primary | ICD-10-CM

## 2022-09-02 DIAGNOSIS — R73.09 ELEVATED GLUCOSE: ICD-10-CM

## 2022-09-02 DIAGNOSIS — I83.811 VARICOSE VEINS OF RIGHT LOWER EXTREMITY WITH PAIN: ICD-10-CM

## 2022-09-02 PROCEDURE — 99214 OFFICE O/P EST MOD 30 MIN: CPT | Performed by: FAMILY MEDICINE

## 2022-09-02 RX ORDER — AZELASTINE 1 MG/ML
SPRAY, METERED NASAL
COMMUNITY

## 2022-09-02 ASSESSMENT — PATIENT HEALTH QUESTIONNAIRE - PHQ9
SUM OF ALL RESPONSES TO PHQ QUESTIONS 1-9: 0
SUM OF ALL RESPONSES TO PHQ9 QUESTIONS 1 & 2: 0
SUM OF ALL RESPONSES TO PHQ QUESTIONS 1-9: 0
SUM OF ALL RESPONSES TO PHQ QUESTIONS 1-9: 0
2. FEELING DOWN, DEPRESSED OR HOPELESS: 0
SUM OF ALL RESPONSES TO PHQ QUESTIONS 1-9: 0
1. LITTLE INTEREST OR PLEASURE IN DOING THINGS: 0

## 2022-09-02 ASSESSMENT — ENCOUNTER SYMPTOMS
SHORTNESS OF BREATH: 0
BLOOD IN STOOL: 0
CONSTIPATION: 0
VOMITING: 0
DIARRHEA: 0

## 2022-09-02 NOTE — PATIENT INSTRUCTIONS
See dr Sophie Mcfarlane for the varicose veins   Continue the careful diet and the medicines  Do remember to get the mammogram done   Consider the shingle vaccine   [facility] dermatology   See back in 6 months

## 2022-09-02 NOTE — PROGRESS NOTES
spray, azelastine 137 mcg (0.1 %) nasal spray aerosol, Disp: , Rfl:     No current facility-administered medications for this visit.      ---------------------------               09/02/22                      1421         ---------------------------   BP:          118/78         Site:    Left Upper Arm     Position:     Sitting        Cuff Size:   Large Adult      Temp:   97.5 °F (36.4 °C)   TempSrc:    Temporal        Weight: 201 lb (91.2 kg)    Height: 5' 10\" (1.778 m)   ---------------------------  Body mass index is 28.84 kg/m². Wt Readings from Last 3 Encounters:  09/02/22 : 201 lb (91.2 kg)  08/31/22 : 198 lb (89.8 kg)  03/18/22 : 205 lb (93 kg)    BP Readings from Last 3 Encounters:  09/02/22 : 118/78  08/31/22 : (!) 143/77  03/18/22 : 134/88          Review of Systems   Constitutional:  Negative for chills and fever. Respiratory:  Negative for shortness of breath. Cardiovascular:  Negative for chest pain and palpitations. Gastrointestinal:  Negative for blood in stool, constipation, diarrhea and vomiting. Genitourinary:  Negative for difficulty urinating. Neurological:  Negative for headaches. Objective:   Physical Exam  Constitutional:       General: She is not in acute distress. Appearance: Normal appearance. She is well-developed. She is not ill-appearing or diaphoretic. Cardiovascular:      Rate and Rhythm: Normal rate and regular rhythm. Heart sounds: Normal heart sounds. No murmur heard. No friction rub. No gallop. Comments: No edema  Varicose veins on lower right leg   Pulmonary:      Effort: Pulmonary effort is normal. No tachypnea, accessory muscle usage or respiratory distress. Breath sounds: Normal breath sounds. No decreased breath sounds, wheezing, rhonchi or rales. Chest:   Breasts:     Right: No supraclavicular adenopathy. Left: No supraclavicular adenopathy.    Lymphadenopathy:      Cervical: No cervical adenopathy. Upper Body:      Right upper body: No supraclavicular adenopathy. Left upper body: No supraclavicular adenopathy. Skin:     General: Skin is warm and dry. Coloration: Skin is not pale. Neurological:      Mental Status: She is alert. Assessment:       Diagnosis Orders   1. Essential hypertension, benign  Comprehensive Metabolic Panel      2. Elevated glucose  Comprehensive Metabolic Panel    Hemoglobin A1C      3. Varicose veins of right lower extremity with pain        4. Skin cancer screening            Epidural done on 8/31/22 reviewed  Allergy visit on 7/20/22 stable  Did have follow up with hematology on 5/11/22 reviewed.        Plan:      See dr Brock Rincon for the varicose veins   Continue the careful diet and the medicines  Do remember to get the mammogram done   Consider the shingle vaccine   [facility] front dermatology   See back in 6 months         Leslie Webster MD

## 2022-09-06 DIAGNOSIS — I10 ESSENTIAL HYPERTENSION, BENIGN: ICD-10-CM

## 2022-09-06 DIAGNOSIS — R73.09 ELEVATED GLUCOSE: ICD-10-CM

## 2022-09-07 LAB
A/G RATIO: 1.6 (ref 1.1–2.2)
ALBUMIN SERPL-MCNC: 4.6 G/DL (ref 3.4–5)
ALP BLD-CCNC: 125 U/L (ref 40–129)
ALT SERPL-CCNC: 21 U/L (ref 10–40)
ANION GAP SERPL CALCULATED.3IONS-SCNC: 13 MMOL/L (ref 3–16)
AST SERPL-CCNC: 15 U/L (ref 15–37)
BILIRUB SERPL-MCNC: 1.1 MG/DL (ref 0–1)
BUN BLDV-MCNC: 17 MG/DL (ref 7–20)
CALCIUM SERPL-MCNC: 9.3 MG/DL (ref 8.3–10.6)
CHLORIDE BLD-SCNC: 104 MMOL/L (ref 99–110)
CO2: 24 MMOL/L (ref 21–32)
CREAT SERPL-MCNC: 1 MG/DL (ref 0.6–1.2)
ESTIMATED AVERAGE GLUCOSE: 134.1 MG/DL
GFR AFRICAN AMERICAN: >60
GFR NON-AFRICAN AMERICAN: 56
GLUCOSE BLD-MCNC: 112 MG/DL (ref 70–99)
HBA1C MFR BLD: 6.3 %
POTASSIUM SERPL-SCNC: 4.5 MMOL/L (ref 3.5–5.1)
SODIUM BLD-SCNC: 141 MMOL/L (ref 136–145)
TOTAL PROTEIN: 7.5 G/DL (ref 6.4–8.2)

## 2022-09-08 DIAGNOSIS — I10 ESSENTIAL HYPERTENSION, BENIGN: Primary | ICD-10-CM

## 2022-09-09 DIAGNOSIS — I10 ESSENTIAL HYPERTENSION, BENIGN: ICD-10-CM

## 2022-09-09 LAB
BACTERIA: ABNORMAL /HPF
BILIRUBIN URINE: NEGATIVE
BLOOD, URINE: NEGATIVE
CLARITY: CLEAR
COLOR: YELLOW
EPITHELIAL CELLS, UA: 3 /HPF (ref 0–5)
GLUCOSE URINE: NEGATIVE MG/DL
HYALINE CASTS: 1 /LPF (ref 0–8)
KETONES, URINE: NEGATIVE MG/DL
LEUKOCYTE ESTERASE, URINE: ABNORMAL
MICROSCOPIC EXAMINATION: YES
NITRITE, URINE: NEGATIVE
PH UA: 6.5 (ref 5–8)
PROTEIN UA: NEGATIVE MG/DL
RBC UA: 1 /HPF (ref 0–4)
SPECIFIC GRAVITY UA: 1.01 (ref 1–1.03)
URINE TYPE: ABNORMAL
UROBILINOGEN, URINE: 0.2 E.U./DL
WBC UA: 3 /HPF (ref 0–5)

## 2022-10-14 ENCOUNTER — OFFICE VISIT (OUTPATIENT)
Dept: VASCULAR SURGERY | Age: 62
End: 2022-10-14
Payer: COMMERCIAL

## 2022-10-14 VITALS — DIASTOLIC BLOOD PRESSURE: 86 MMHG | BODY MASS INDEX: 28.84 KG/M2 | WEIGHT: 201 LBS | SYSTOLIC BLOOD PRESSURE: 128 MMHG

## 2022-10-14 DIAGNOSIS — D75.1 POLYCYTHEMIA: ICD-10-CM

## 2022-10-14 DIAGNOSIS — I83.891 SYMPTOMATIC VARICOSE VEINS OF RIGHT LOWER EXTREMITY: ICD-10-CM

## 2022-10-14 DIAGNOSIS — R60.0 LEG EDEMA, RIGHT: ICD-10-CM

## 2022-10-14 DIAGNOSIS — M79.604 LEG PAIN, CENTRAL, RIGHT: ICD-10-CM

## 2022-10-14 DIAGNOSIS — M79.605 LEG PAIN, CENTRAL, LEFT: ICD-10-CM

## 2022-10-14 PROCEDURE — 99203 OFFICE O/P NEW LOW 30 MIN: CPT | Performed by: SURGERY

## 2022-10-14 ASSESSMENT — ENCOUNTER SYMPTOMS
BACK PAIN: 1
EYES NEGATIVE: 1
RESPIRATORY NEGATIVE: 1
ALLERGIC/IMMUNOLOGIC NEGATIVE: 1

## 2022-10-14 NOTE — PROGRESS NOTES
Daily Progress Note   Billie Loomis MD      10/14/2022    Chief Complaint   Patient presents with    New Patient     Ref by Dr. Ines Henriquez for varicose veins. Pt states she notices her left leg is worse than the right, some pain, denies wearing compression stockings, some swelling. HISTORY OF PRESENT ILLNESS:                The patient is a 60 y.o. female who presents with a referral from Dr. Kev Lau for varicose veins. .    Mrs. Mihai Guido says both legs hurt, but the right tends to hurt more, especially at the bottom of her leg. She says her aunt had varicose veins whereas her mother did not. She says she exercises regularly and elevates her legs at night. She has pain and swelling of her legs.     Past Medical History:   Diagnosis Date    Aldosteronism ([address]     Asthma     HYPERCHOLESTERAEMIA     Hypertension     Psychogenic nonepileptic seizure        Past Surgical History:   Procedure Laterality Date    CHOLECYSTECTOMY      COLONOSCOPY  11/13/2008    normal dr John Antonio, had previous polyp in 2003    COLONOSCOPY  8/31/15    polyps, dr Chele Santacruz, repeat 3 years    COLONOSCOPY N/A 11/29/2018    COLONOSCOPY POLYPECTOMY SNARE/COLD BIOPSY performed by Ines Murphy MD at [address] [facility] [address]  5/2003    OTHER SURGICAL HISTORY  7/2013    uterine ablation    PAIN MANAGEMENT PROCEDURE Left 10/28/2021    LEFT L4 AND L5 TRANSFORAMINAL EPIDURAL STEROID INJECTION WITH FLUOROSCOPY (65956, 60004) performed by Francisca Walker MD at [address] [place] [address] Left 12/9/2021    LEFT L4 AND L5 TRANSFORAMINAL EPIDURAL STEROID INJECTION WITH FLUOROSCOPY performed by Francisca Walker MD at [address] [place] [address] Bilateral 2/10/2022    BILATERAL S-1 TRANSFORAMINAL EPIDURAL STEROID INJECTION WITH FLUOROSCOPY performed by Francisca Walker MD at [address] [place] [address] Bilateral 8/31/2022    BILATERAL SACRAL1 TRANSFORAMINAL EPIDURAL STEROID INJECTION WITH FLUOROSCOPY performed by Dale Mayer MD at [facility] History     Socioeconomic History    Marital status:      Spouse name: Not on file    Number of children: 2    Years of education: Not on file    Highest education level: Not on file   Occupational History    Not on file   Tobacco Use    Smoking status: Never    Smokeless tobacco: Never   Vaping Use    Vaping Use: Never used   Substance and Sexual Activity    Alcohol use: Yes     Alcohol/week: 0.8 standard drinks     Types: 1 drink(s) per week     Comment: occ    Drug use: No    Sexual activity: Not on file   Other Topics Concern    Not on file   Social History Narrative    Not on file     Social Determinants of Health     Financial Resource Strain: Not on file   Food Insecurity: Not on file   Transportation Needs: Not on file   Physical Activity: Not on file   Stress: Not on file   Social Connections: Not on file   Intimate Partner Violence: Not on file   Housing Stability: Not on file       Family History   Problem Relation Age of Onset    Heart Disease Father     High Blood Pressure Father     Cancer Father         [facility]         Current Outpatient Medications:     azelastine (ASTELIN) 0.1 % nasal spray, azelastine 137 mcg (0.1 %) nasal spray aerosol, Disp: , Rfl:     atorvastatin (LIPITOR) 20 MG tablet, TAKE ONE TABLET BY MOUTH DAILY, Disp: 30 tablet, Rfl: 5    amLODIPine (NORVASC) 10 MG tablet, TAKE ONE TABLET BY MOUTH DAILY, Disp: 30 tablet, Rfl: 5    Fluticasone Furoate (ARNUITY ELLIPTA IN), Inhale 1 puff into the lungs daily Indications: pt is using 200 , Disp: , Rfl:     Phenylephrine HCl (SUDAFED PE CONGESTION PO), Take 1 tablet by mouth as needed, Disp: , Rfl:     acetaminophen (TYLENOL) 325 MG tablet, Take 650 mg by mouth every 6 hours as needed for Pain, Disp: , Rfl:     fluticasone (FLONASE) 50 MCG/ACT nasal spray, 1 spray by Nasal route daily. , Disp: , Rfl:     metoprolol (TOPROL-XL) 25 MG XL tablet, Take 25 mg by mouth daily. , Disp: , Rfl: EPINEPHrine (EPIPEN) 0.3 MG/0.3ML JO injection, Inject 0.3 mg into the muscle as needed. Use as directed for allergic reaction, Disp: , Rfl:     spironolactone (ALDACTONE) 25 MG tablet, Take 25 mg by mouth 2 times daily. , Disp: , Rfl:     levalbuterol (XOPENEX) 0.63 MG/3ML nebulization, 1 ampule every 4 hours as needed , Disp: , Rfl:     levocetirizine (XYZAL) 5 MG tablet, Take 5 mg by mouth nightly , Disp: , Rfl:     Peanut-containing drug products, Soybean-containing drug products, Ace inhibitors, Aspirin, Buckwheat, Cashew nut oil, Covid-19 mrna vacc (moderna), Nsaids, and Other    Vitals:    10/14/22 1010   BP: 128/86   Weight: 201 lb (91.2 kg)       Orders Only on 09/09/2022   Component Date Value Ref Range Status    Color, UA 09/09/2022 Yellow  Straw/Yellow Final    Clarity, UA 09/09/2022 Clear  Clear Final    Glucose, Ur 09/09/2022 Negative  Negative mg/dL Final    Bilirubin Urine 09/09/2022 Negative  Negative Final    Ketones, Urine 09/09/2022 Negative  Negative mg/dL Final    Specific Gravity, UA 09/09/2022 1.006  1.005 - 1.030 Final    Blood, Urine 09/09/2022 Negative  Negative Final    pH, UA 09/09/2022 6.5  5.0 - 8.0 Final    Protein, UA 09/09/2022 Negative  Negative mg/dL Final    Urobilinogen, Urine 09/09/2022 0.2  <2.0 E.U./dL Final    Nitrite, Urine 09/09/2022 Negative  Negative Final    Leukocyte Esterase, Urine 09/09/2022 SMALL (A)  Negative Final    Microscopic Examination 09/09/2022 YES   Final    Urine Type 09/09/2022 NotGiven   Final    Bacteria, UA 09/09/2022 None Seen  None Seen /HPF Final    Hyaline Casts, UA 09/09/2022 1  0 - 8 /LPF Final    WBC, UA 09/09/2022 3  0 - 5 /HPF Final    RBC, UA 09/09/2022 1  0 - 4 /HPF Final    Epithelial Cells, UA 09/09/2022 3  0 - 5 /HPF Final    Comment: Urinalysis microscopic and digital image assisted microscopic  performed using the automated methodology ([id number] system). Review of Systems   Constitutional: Negative. HENT: Negative. Eyes: Negative. Respiratory: Negative. Cardiovascular:  Positive for leg swelling. Endocrine: Negative. Genitourinary: Negative. Musculoskeletal:  Positive for back pain (fell while rock climbing). Skin: Negative. Allergic/Immunologic: Negative. Neurological: Negative. Hematological: Negative. Psychiatric/Behavioral: Negative. Physical Exam  Vitals and nursing note reviewed. Constitutional:       Appearance: Normal appearance. She is well-developed. HENT:      Mouth/Throat:      Pharynx: No oropharyngeal exudate. Eyes:      Conjunctiva/sclera: Conjunctivae normal.      Pupils: Pupils are equal, round, and reactive to light. Cardiovascular:      Rate and Rhythm: Normal rate and regular rhythm. Pulses:           Carotid pulses are 2+ on the right side and 2+ on the left side. Radial pulses are 2+ on the right side and 2+ on the left side. Femoral pulses are 2+ on the right side and 2+ on the left side. Dorsalis pedis pulses are 2+ on the right side and 2+ on the left side. Posterior tibial pulses are 2+ on the right side and 2+ on the left side. Heart sounds: Normal heart sounds. Comments:   MEASUREMENTS 10/14/2022:    RIGHT ANKLE: 22.0 cm   RIGHT CALF: 40.5 cm     LEFT ANKLE: 22.4 cm   LEFT CALF: 39.7 cm     Postiive Trendelenburg test, right leg  Pulmonary:      Effort: Pulmonary effort is normal.      Breath sounds: Normal breath sounds. Abdominal:      General: Bowel sounds are normal.       Musculoskeletal:         General: Normal range of motion. Cervical back: Normal range of motion. Back:         Legs:    Neurological:      Mental Status: She is alert and oriented to person, place, and time. Deep Tendon Reflexes: Reflexes are normal and symmetric. Psychiatric:         Speech: Speech normal.         Behavior: Behavior normal.         Thought Content:  Thought content normal.         Judgment: Judgment normal.     Dr. Jon Longoria took out her gallbladder laparoscopically. She has polycythemia. She has had a uterine ablation. ASSESSMENT:    Problem List Items Addressed This Visit          Medium    Symptomatic varicose veins of right lower extremity    Polycythemia    Leg pain, central, right    RESOLVED: Leg pain, central, left    Leg edema, right     She has to donate blood and have it drawn away on a regular basis because of her polycythemia. Does not have history of blood clots. Has had 2 children vaginal births. Status post gallbladder that apparently looked infected. PLAN:    We will give you a prescription for 30-40 mmHg compression stockings. Wear these daily taking them off at night while sleeping. Schedule to return in three months for a bilateral lower extremity reflux study and office visit. Tura Cowden, MA am scribing for and in the presence of Pascual Costa MD on this date of 10/14/22    I Christo Davidson MD personally performed the services described in this documentation as scribed by the Medical Assistant Jose Ahn in my presence and it is both accurate and complete.       Electronically signed by Pascual Costa MD on 10/14/2022 at 12:25 PM

## 2022-10-14 NOTE — PATIENT INSTRUCTIONS
We will give you a prescription for 30-40 mmHg compression stockings. Wear these daily taking them off at night while sleeping. Schedule to return in three months for a bilateral lower extremity reflux study and office visit.

## 2022-11-28 ENCOUNTER — COMMUNITY OUTREACH (OUTPATIENT)
Dept: FAMILY MEDICINE CLINIC | Age: 62
End: 2022-11-28

## 2022-11-28 NOTE — PROGRESS NOTES
Patient's HM shows they are overdue for [place] [id number] and  files searched. No results to attach to order nor HM updated.

## 2022-11-30 RX ORDER — AMLODIPINE BESYLATE 10 MG/1
TABLET ORAL
Qty: 30 TABLET | Refills: 5 | Status: SHIPPED | OUTPATIENT
Start: 2022-11-30

## 2022-11-30 RX ORDER — ATORVASTATIN CALCIUM 20 MG/1
TABLET, FILM COATED ORAL
Qty: 30 TABLET | Refills: 5 | Status: SHIPPED | OUTPATIENT
Start: 2022-11-30

## 2022-12-20 NOTE — PROGRESS NOTES
PATIENT REACHED   YES_X_NO____    PREOP INSTRUCTIONS LEFT ON VM NUMBER_______________      XFCB__[id number]_______ TIME_1530________ARRIVAL__1430______PLACE__SIC__________  NOTHING TO EAT OR DRINK  AFTER MIDNIGHT THE EVENING PRIOR OR AS INSTRUCTED BY YOUR DR.  Garnell Castleman NEED A RESPONSIBLE ADULT AGE 18 OR OLDER TO DRIVE YOU HOME  PLEASE BRING INSURANCE CARD. PICTURE ID AND COMPLETE LIST OF MEDS  WEAR LOOSE COMFORTABLE CLOTHING  FOLLOW ANY INSTRUCTIONS YOUR DRS OFFICE HAS GIVEN YOU,INCLUDING WHAT MEDICATIONS TO TAKE THE AM OF PROCEDURE AND WHEN AND IF YOU NEED TO STOP ANY BLOOD THINNERS. IF YOU HAVE QUESTIONS REGARDING THIS CALL THE OFFICE  THE GOAL BLOOD SUGAR THE AM OF PROCEDURE  OR LESS ABOVE THAT THE PROCEDURE MAY BE CANCELLED  ANY QUESTIONS CALL YOUR DOCTOR. ALSO,PLEASE READ THE INSTRUCTION PACKET FROM YOUR DR IF YOU RECEIVED ONE. SPINE INTERVENTION NUMBER [phone]      OTHER___________________________________      VISITOR POLICY(subject to change)    Current policy is 2 visitors per patient. No children. Masks are required.

## 2022-12-21 ENCOUNTER — HOSPITAL ENCOUNTER (OUTPATIENT)
Age: 62
Setting detail: OUTPATIENT SURGERY
Discharge: HOME | End: 2022-12-21
Attending: PHYSICAL MEDICINE & REHABILITATION | Admitting: PHYSICAL MEDICINE & REHABILITATION
Payer: COMMERCIAL

## 2022-12-21 ENCOUNTER — APPOINTMENT (OUTPATIENT)
Dept: GENERAL RADIOLOGY | Age: 62
End: 2022-12-21
Attending: PHYSICAL MEDICINE & REHABILITATION
Payer: COMMERCIAL

## 2022-12-21 VITALS
HEIGHT: 70 IN | WEIGHT: 198 LBS | BODY MASS INDEX: 28.35 KG/M2 | RESPIRATION RATE: 14 BRPM | TEMPERATURE: 97.2 F | HEART RATE: 66 BPM | OXYGEN SATURATION: 99 % | DIASTOLIC BLOOD PRESSURE: 75 MMHG | SYSTOLIC BLOOD PRESSURE: 125 MMHG

## 2022-12-21 PROCEDURE — 2500000003 HC RX 250 WO HCPCS: Performed by: PHYSICAL MEDICINE & REHABILITATION

## 2022-12-21 PROCEDURE — 2709999900 HC NON-CHARGEABLE SUPPLY: Performed by: PHYSICAL MEDICINE & REHABILITATION

## 2022-12-21 PROCEDURE — 3610000056 HC PAIN LEVEL 4 BASE (NON-OR): Performed by: PHYSICAL MEDICINE & REHABILITATION

## 2022-12-21 PROCEDURE — 99152 MOD SED SAME PHYS/QHP 5/>YRS: CPT | Performed by: PHYSICAL MEDICINE & REHABILITATION

## 2022-12-21 PROCEDURE — 3209999900 FLUORO FOR SURGICAL PROCEDURES

## 2022-12-21 PROCEDURE — 6360000002 HC RX W HCPCS: Performed by: PHYSICAL MEDICINE & REHABILITATION

## 2022-12-21 RX ORDER — DEXAMETHASONE SODIUM PHOSPHATE 10 MG/ML
INJECTION, SOLUTION INTRAMUSCULAR; INTRAVENOUS
Status: COMPLETED | OUTPATIENT
Start: 2022-12-21 | End: 2022-12-21

## 2022-12-21 RX ORDER — FENTANYL CITRATE 50 UG/ML
INJECTION, SOLUTION INTRAMUSCULAR; INTRAVENOUS
Status: COMPLETED | OUTPATIENT
Start: 2022-12-21 | End: 2022-12-21

## 2022-12-21 RX ORDER — MIDAZOLAM HYDROCHLORIDE 1 MG/ML
INJECTION INTRAMUSCULAR; INTRAVENOUS
Status: COMPLETED | OUTPATIENT
Start: 2022-12-21 | End: 2022-12-21

## 2022-12-21 RX ORDER — LIDOCAINE HYDROCHLORIDE 10 MG/ML
INJECTION, SOLUTION EPIDURAL; INFILTRATION; INTRACAUDAL; PERINEURAL
Status: COMPLETED | OUTPATIENT
Start: 2022-12-21 | End: 2022-12-21

## 2022-12-21 ASSESSMENT — PAIN SCALES - GENERAL
PAINLEVEL_OUTOF10: 3
PAINLEVEL_OUTOF10: 4

## 2022-12-21 ASSESSMENT — PAIN DESCRIPTION - DESCRIPTORS: DESCRIPTORS: NUMBNESS;ACHING;DISCOMFORT

## 2022-12-21 NOTE — PROGRESS NOTES
IV discontinued, catheter intact, and dressing applied. Procedural dressing dry and intact. Bilateral lower extremities equal in strength. Discharge instructions reviewed with patient  signed and copy given. All home medications have been reviewed. All questions answered and patient verbalized understanding.

## 2022-12-21 NOTE — DISCHARGE INSTRUCTIONS
DR. Regis Martell DISCHARGE INSTRUCTIONS           1. Do not drive today for ( 8 hours with no sedation)  (24 hours if had sedation)     2. If you received sedation during your procedure, be advised for the next 24 hour       Do not drink alcohol    Do not operate machinery    Do not make any important decisions or sign any legal documents    You may experience light headedness,dizziness or sleepiness     3. You may apply ice for 15 minutes 4-5 times a day as needed. 4. No heating pad for 48 hours     5. Follow up with the Provider who referred you. 6.Keep site dry for 24 hours after procedure, then remove bandaid. 7.It may take 24-48 hours to feel improvement. 8. Side effects of Steroids may include:      Elevated glucose levels ( in diabetics)    Fluid retention    Tenderness at site    Nervous energy    Sleeplessness    Muscle spasms    Facial flushing    Hot flashes     9. Call if:    Your symptoms worsen severely    You experience a severe headache that worsens when upright    You develop a fever greater than 101 degrees     ([phone])                             10. You may restart any blood thinning medications tomorrow.

## 2022-12-21 NOTE — H&P
HISTORY AND PHYSICAL/PRE-SEDATION ASSESSMENT    Patient:  Viet Cintron   :  1960  Medical Record No.:  [id number]   Date:  2022  Physician:  Selvin Ojeda MD  Facility: [address] [place] [address] Drive:                 The patient is a 60 y.o. female whom presents with leg pain. Review of the imaging and physical exam of the patient confirmed the pre-procedure diagnosis. After a thorough discussion of risks, benefits and alternatives informed consent was obtained.     Diagnosis:  Lumbar radiculopathy [M54.16]    Past Medical History:   Past Medical History:   Diagnosis Date    Aldosteronism ([facility] [address]     Asthma     HYPERCHOLESTERAEMIA     Hypertension     Psychogenic nonepileptic seizure         Past Surgical History:     Past Surgical History:   Procedure Laterality Date    CHOLECYSTECTOMY      COLONOSCOPY  2008    normal dr Elzbieta Ross, had previous polyp in     COLONOSCOPY  8/31/15    polyps, dr Kellen Em, repeat 3 years    COLONOSCOPY N/A 2018    COLONOSCOPY POLYPECTOMY SNARE/COLD BIOPSY performed by Jd Carrillo MD at [address] [place] [address] Nw  2003    OTHER SURGICAL HISTORY  2013    uterine ablation    PAIN MANAGEMENT PROCEDURE Left 10/28/2021    LEFT L4 AND L5 TRANSFORAMINAL EPIDURAL STEROID INJECTION WITH FLUOROSCOPY (24854, 82679) performed by Selvin Ojeda MD at [address] [place] [address] Left 2021    LEFT L4 AND L5 TRANSFORAMINAL EPIDURAL STEROID INJECTION WITH FLUOROSCOPY performed by Selvin Ojeda MD at [address] [place] [address] Bilateral 2/10/2022    BILATERAL S-1 TRANSFORAMINAL EPIDURAL STEROID INJECTION WITH FLUOROSCOPY performed by Selvin Ojeda MD at [address] [place] [address] Bilateral 2022    BILATERAL SACRAL1 TRANSFORAMINAL EPIDURAL STEROID INJECTION WITH FLUOROSCOPY performed by Selvin Ojeda MD at [facility]       Current Medications:   Prior to Admission medications    Medication Sig Start Date End Date Taking? Authorizing Provider   atorvastatin (LIPITOR) 20 MG tablet TAKE ONE TABLET BY MOUTH DAILY 11/30/22   Lorena Haas MD   amLODIPine (NORVASC) 10 MG tablet TAKE ONE TABLET BY MOUTH DAILY 11/30/22   Lorena Haas MD   azelastine (ASTELIN) 0.1 % nasal spray azelastine 137 mcg (0.1 %) nasal spray aerosol    Historical Provider, MD   Fluticasone Furoate (ARNUITY ELLIPTA IN) Inhale 1 puff into the lungs daily Indications: pt is using 200     Historical Provider, MD   Phenylephrine HCl (SUDAFED PE CONGESTION PO) Take 1 tablet by mouth as needed    Historical Provider, MD   acetaminophen (TYLENOL) 325 MG tablet Take 650 mg by mouth every 6 hours as needed for Pain    Historical Provider, MD   fluticasone (FLONASE) 50 MCG/ACT nasal spray 1 spray by Nasal route daily. Historical Provider, MD   metoprolol (TOPROL-XL) 25 MG XL tablet Take 25 mg by mouth daily. Historical Provider, MD   EPINEPHrine (EPIPEN) 0.3 MG/0.3ML JO injection Inject 0.3 mg into the muscle as needed. Use as directed for allergic reaction    Historical Provider, MD   spironolactone (ALDACTONE) 25 MG tablet Take 25 mg by mouth 2 times daily. Historical Provider, MD   levalbuterol [facility]) 0.63 MG/3ML nebulization 1 ampule every 4 hours as needed     Historical Provider, MD   levocetirizine (XYZAL) 5 MG tablet Take 5 mg by mouth nightly  6/24/10   Historical Provider, MD       Allergies:  Peanut-containing drug products, Soybean-containing drug products, Ace inhibitors, Aspirin, Buckwheat, Cashew nut oil, Covid-19 mrna vacc (moderna), Nsaids, and Other    Social History:    reports that she has never smoked. She has never used smokeless tobacco. She reports current alcohol use of about 0.8 standard drinks per week. She reports that she does not use drugs.     Family History:   Family History   Problem Relation Age of Onset    Heart Disease Father     High Blood Pressure Father Cancer Father         [facility]        Vitals: Last menstrual period 10/10/2013, not currently breastfeeding. PHYSICAL EXAM:  HENT: Airway patent and reviewed  Cardiovascular: Normal rate, regular rhythm, normal heart sounds. Pulmonary/Chest: No wheezes. No rhonchi. No rales. Abdominal: Soft. Bowel sounds are normal. No distension. Extremities: Moves all extremities equally  Lumbar Spine: Painful range of motion, no midline tenderness     ASA CLASS:         []   I. Normal, healthy adult           [x]   II.  Mild systemic disease            []   III. Severe systemic disease    Mallampati: Mallampati Class II - (soft palate, fauces & uvula are visible)      Sedation plan:   []  Local              [x]  Minimal                  []  General anesthesia    Treatment plan:  Patient's condition acceptable for planned procedure/sedation. Proceed with planned procedure   Post Procedure Plan   Return to same level of care   ______________________     The risks and benefits as well as alternatives to the procedure have been discussed with the patient and or family. The patient and/or next of kin understands and agrees to proceed.     Jacqueline Cowden, MD

## 2022-12-21 NOTE — OP NOTE
PATIENT:  Eva Gongora  AGE:  69 yrs  MEDICAL RECORD #:  [id number]  YOB: 1960     DATE:  12/21/2022  PHYSICIAN: Mariann Schwab M.D. PROCEDURE: Bilateral S1 transforaminal epidural steroid injection under fluoroscopy. PRE-OP DIAGNOSIS:  Low Back Pain/Radiculopathy     POST-OP DIAGNOSIS:  same     HISTORY OF PRESENT ILLNESS:  See office notes. Patient has failed previous less-invasive treatments. ALLERGIES:  Peanut-containing drug products, Soybean-containing drug products, Ace inhibitors, Aspirin, Buckwheat, Cashew nut oil, Covid-19 mrna vacc (moderna), Nsaids, and Other     MEDICATIONS:    No current facility-administered medications for this encounter. PHYSICAL EXAMINATION:              General:  Awake, alert              Heart:  No audible murmurs, extremities well perfused              Lungs:  No increased WOB or audible wheezing              Extremities:  Normal tone. Warm. No swelling. Anesthesia: 1 mg Versed and 50 mcg fentanyl    Estimated blood loss: None  Complications: None  Specimen: None    DESCRIPTION OF PROCEDURE:     Components of the procedure were again reviewed with the patient prior to the procedure. She is aware of risks including infection, bleeding, allergic reaction, and nerve injury. She had ample opportunity for additional questions. She elected to proceed with treatment. The patient was placed in the prone position. Cardiovascular monitoring was initiated, and vital signs were stable prior to, during, and after the procedure. Utilizing fluoroscopy, the S1 vertebrae was identified. The area was sterilely prepped and draped. Skin anesthesia was achieved at each entry site using 1-2 cc of Lidocaine 1%. A 22 g 3.5 inch spinal needle was slowly inserted into the bilateral S1 neuroforamen using AP, lateral and oblique fluoroscopic imaging. Negative aspiration was confirmed.  1 cc Isovue-M 300 was injected at each site showing contrast spread into the epidural space and along the nerve root. A combination of 1 cc Lidocaine 1% and 10 mg dexamethasone were slowly injected at each site. The needles were removed after the stylets were repositioned. A sterile bandage was applied. The patient was brought to recovery in stable condition. The patient tolerated the procedure well. DISPOSITION:  The patient was transported to recovery. The patient was monitored for 15 to 20 minutes post-procedure. Precautions were discussed and written instructions provided.     Comment: None

## 2023-01-17 ENCOUNTER — PROCEDURE VISIT (OUTPATIENT)
Dept: SURGERY | Age: 63
End: 2023-01-17

## 2023-01-17 ENCOUNTER — OFFICE VISIT (OUTPATIENT)
Dept: VASCULAR SURGERY | Age: 63
End: 2023-01-17
Payer: COMMERCIAL

## 2023-01-17 VITALS
SYSTOLIC BLOOD PRESSURE: 146 MMHG | WEIGHT: 193 LBS | DIASTOLIC BLOOD PRESSURE: 80 MMHG | HEIGHT: 70 IN | BODY MASS INDEX: 27.63 KG/M2

## 2023-01-17 DIAGNOSIS — R60.0 LEG EDEMA, RIGHT: ICD-10-CM

## 2023-01-17 DIAGNOSIS — I83.891 SYMPTOMATIC VARICOSE VEINS OF RIGHT LOWER EXTREMITY: Primary | ICD-10-CM

## 2023-01-17 DIAGNOSIS — M79.605 PAIN IN BOTH LOWER EXTREMITIES: ICD-10-CM

## 2023-01-17 DIAGNOSIS — M79.604 PAIN IN BOTH LOWER EXTREMITIES: ICD-10-CM

## 2023-01-17 PROCEDURE — 3079F DIAST BP 80-89 MM HG: CPT | Performed by: SURGERY

## 2023-01-17 PROCEDURE — 3077F SYST BP >= 140 MM HG: CPT | Performed by: SURGERY

## 2023-01-17 PROCEDURE — 99213 OFFICE O/P EST LOW 20 MIN: CPT | Performed by: SURGERY

## 2023-01-17 ASSESSMENT — ENCOUNTER SYMPTOMS
BACK PAIN: 1
ALLERGIC/IMMUNOLOGIC NEGATIVE: 1
RESPIRATORY NEGATIVE: 1
EYES NEGATIVE: 1

## 2023-01-17 NOTE — PROGRESS NOTES
Daily Progress Note   Carlos Malin MD      1/17/2023    Chief Complaint   Patient presents with    Follow-up     Varicose veins and reflux study         HISTORY OF PRESENT ILLNESS:                The patient is a 60 y.o. female who presents with a three month follow up for reflux study for varicose veins. The reflux study today shows her right leg has more reflux than the left. This coincides with the pain she is feeling. She has been elevating her legs as needed, and takes tylenol as needed as well. She has been wearing her compression stockings but they have not helped much.      Past Medical History:   Diagnosis Date    Aldosteronism ([address]     Asthma     HYPERCHOLESTERAEMIA     Hypertension     Psychogenic nonepileptic seizure        Past Surgical History:   Procedure Laterality Date    CHOLECYSTECTOMY      COLONOSCOPY  11/13/2008    normal dr Stephane Diego, had previous polyp in 2003    COLONOSCOPY  8/31/15    polyps, dr Jenny Ramos, repeat 3 years    COLONOSCOPY N/A 11/29/2018    COLONOSCOPY POLYPECTOMY SNARE/COLD BIOPSY performed by Reynaldo Moran MD at [address] [place] [address] Nw  5/2003    OTHER SURGICAL HISTORY  7/2013    uterine ablation    PAIN MANAGEMENT PROCEDURE Left 10/28/2021    LEFT L4 AND L5 TRANSFORAMINAL EPIDURAL STEROID INJECTION WITH FLUOROSCOPY ([id number], [id number]) performed by Isacc Gandhi MD at [address] [place] [address] Left 12/9/2021    LEFT L4 AND L5 TRANSFORAMINAL EPIDURAL STEROID INJECTION WITH FLUOROSCOPY performed by Isacc Gandhi MD at [address] [place] [address] Bilateral 2/10/2022    BILATERAL S-1 TRANSFORAMINAL EPIDURAL STEROID INJECTION WITH FLUOROSCOPY performed by Isacc Gandhi MD at [address] [place] [address] Bilateral 8/31/2022    BILATERAL SACRAL1 TRANSFORAMINAL EPIDURAL STEROID INJECTION WITH FLUOROSCOPY performed by Isacc Gandhi MD at [address] [place] [address] Bilateral 12/21/2022    BILATERAL S1 TRANSFORAMINAL EPIDURAL STEROID INJECTION WITH FLUOROSCOPY performed by Viviana Dias MD at [address] History    Marital status:      Spouse name: Not on file    Number of children: 2    Years of education: Not on file    Highest education level: Not on file   Occupational History    Not on file   Tobacco Use    Smoking status: Never    Smokeless tobacco: Never   Vaping Use    Vaping Use: Never used   Substance and Sexual Activity    Alcohol use: Yes     Alcohol/week: 0.8 standard drinks     Types: 1 drink(s) per week     Comment: occ    Drug use: No    Sexual activity: Not on file   Other Topics Concern    Not on file   Social History Narrative    Not on file     Social Determinants of Health     Financial Resource Strain: Not on file   Food Insecurity: Not on file   Transportation Needs: Not on file   Physical Activity: Not on file   Stress: Not on file   Social Connections: Not on file   Intimate Partner Violence: Not on file   Housing Stability: Not on file       Family History   Problem Relation Age of Onset    Heart Disease Father     High Blood Pressure Father     Cancer Father         [facility]         Current Outpatient Medications:     atorvastatin (LIPITOR) 20 MG tablet, TAKE ONE TABLET BY MOUTH DAILY, Disp: 30 tablet, Rfl: 5    amLODIPine (NORVASC) 10 MG tablet, TAKE ONE TABLET BY MOUTH DAILY, Disp: 30 tablet, Rfl: 5    Fluticasone Furoate (ARNUITY ELLIPTA IN), Inhale 1 puff into the lungs daily Indications: pt is using 200 , Disp: , Rfl:     metoprolol (TOPROL-XL) 25 MG XL tablet, Take 25 mg by mouth daily. , Disp: , Rfl:     EPINEPHrine (EPIPEN) 0.3 MG/0.3ML JO injection, Inject 0.3 mg into the muscle as needed. Use as directed for allergic reaction, Disp: , Rfl:     spironolactone (ALDACTONE) 25 MG tablet, Take 25 mg by mouth 2 times daily. , Disp: , Rfl:     levalbuterol (XOPENEX) 0.63 MG/3ML nebulization, 1 ampule every 4 hours as needed , Disp: , Rfl: levocetirizine (XYZAL) 5 MG tablet, Take 5 mg by mouth nightly , Disp: , Rfl:     azelastine (ASTELIN) 0.1 % nasal spray, azelastine 137 mcg (0.1 %) nasal spray aerosol, Disp: , Rfl:     Phenylephrine HCl (SUDAFED PE CONGESTION PO), Take 1 tablet by mouth as needed, Disp: , Rfl:     acetaminophen (TYLENOL) 325 MG tablet, Take 650 mg by mouth every 6 hours as needed for Pain, Disp: , Rfl:     fluticasone (FLONASE) 50 MCG/ACT nasal spray, 1 spray by Nasal route daily. , Disp: , Rfl:     Peanut-containing drug products, Soybean-containing drug products, Ace inhibitors, Aspirin, Buckwheat, Cashew nut oil, Covid-19 mrna vacc (moderna), Nsaids, and Other    Vitals:    01/17/23 1346   BP: (!) 146/80   Site: Right Upper Arm   Weight: 193 lb (87.5 kg)   Height: 5' 10\" (1.778 m)       Orders Only on 09/09/2022   Component Date Value Ref Range Status    Color, UA 09/09/2022 Yellow  Straw/Yellow Final    Clarity, UA 09/09/2022 Clear  Clear Final    Glucose, Ur 09/09/2022 Negative  Negative mg/dL Final    Bilirubin Urine 09/09/2022 Negative  Negative Final    Ketones, Urine 09/09/2022 Negative  Negative mg/dL Final    Specific Gravity, UA 09/09/2022 1.006  1.005 - 1.030 Final    Blood, Urine 09/09/2022 Negative  Negative Final    pH, UA 09/09/2022 6.5  5.0 - 8.0 Final    Protein, UA 09/09/2022 Negative  Negative mg/dL Final    Urobilinogen, Urine 09/09/2022 0.2  <2.0 E.U./dL Final    Nitrite, Urine 09/09/2022 Negative  Negative Final    Leukocyte Esterase, Urine 09/09/2022 SMALL (A)  Negative Final    Microscopic Examination 09/09/2022 YES   Final    Urine Type 09/09/2022 NotGiven   Final    Bacteria, UA 09/09/2022 None Seen  None Seen /HPF Final    Hyaline Casts, UA 09/09/2022 1  0 - 8 /LPF Final    WBC, UA 09/09/2022 3  0 - 5 /HPF Final    RBC, UA 09/09/2022 1  0 - 4 /HPF Final    Epithelial Cells, UA 09/09/2022 3  0 - 5 /HPF Final    Comment: Urinalysis microscopic and digital image assisted microscopic  performed using the automated methodology (Courseload system). Review of Systems   Constitutional: Negative. HENT: Negative. Eyes: Negative. Respiratory: Negative. Cardiovascular:  Positive for leg swelling. Endocrine: Negative. Genitourinary: Negative. Musculoskeletal:  Positive for back pain (fell while rock climbing). Skin: Negative. Allergic/Immunologic: Negative. Neurological: Negative. Hematological: Negative. Psychiatric/Behavioral: Negative. Physical Exam  Vitals and nursing note reviewed. Constitutional:       Appearance: Normal appearance. She is well-developed. HENT:      Mouth/Throat:      Pharynx: No oropharyngeal exudate. Eyes:      Conjunctiva/sclera: Conjunctivae normal.      Pupils: Pupils are equal, round, and reactive to light. Cardiovascular:      Rate and Rhythm: Normal rate and regular rhythm. Pulses:           Carotid pulses are 2+ on the right side and 2+ on the left side. Radial pulses are 2+ on the right side and 2+ on the left side. Femoral pulses are 2+ on the right side and 2+ on the left side. Dorsalis pedis pulses are 2+ on the right side and 2+ on the left side. Posterior tibial pulses are 2+ on the right side and 2+ on the left side. Heart sounds: Normal heart sounds. Comments:   MEASUREMENTS 1/17/2023:    RIGHT ANKLE: 22.1 cm   RIGHT CALF: 40.7 cm     LEFT ANKLE: 22.3 cm   LEFT CALF: 40.0 cm         MEASUREMENTS 10/14/2022:    RIGHT ANKLE: 22.0 cm   RIGHT CALF: 40.5 cm     LEFT ANKLE: 22.4 cm   LEFT CALF: 39.7 cm     Postiive Trendelenburg test, right leg  Pulmonary:      Effort: Pulmonary effort is normal.      Breath sounds: Normal breath sounds. Abdominal:      General: Bowel sounds are normal.       Musculoskeletal:         General: Normal range of motion. Cervical back: Normal range of motion.         Back:         Legs:    Neurological:      Mental Status: She is alert and oriented to person, place, and time. Deep Tendon Reflexes: Reflexes are normal and symmetric. Psychiatric:         Speech: Speech normal.         Behavior: Behavior normal.         Thought Content: Thought content normal.         Judgment: Judgment normal.     She has polycythemia. She had an infected gallbladder. She has back pain and gets injections for the pain. ASSESSMENT:    Problem List Items Addressed This Visit       Symptomatic varicose veins of right lower extremity - Primary   Duplex scan reviewed shows severe valvular incompetence of the right common femoral greater saphenous vein at the junction and down the thigh and calf on the left its only at the saphenofemoral junction. PLAN:    We will submit to your insurance to see if they will cover varicose vein surgery. You would be getting right leg VNUS ablation and stab phlebectomies greater than 20. Candance Bode, MA am scribing for and in the presence of Campbell Riggins MD on this date of 01/17/23    I Michael Michael MD personally performed the services described in this documentation as scribed by the Medical Assistant Jose Manuel Ahn in my presence and it is both accurate and complete.       Electronically signed by Campbell Riggins MD on 1/17/2023 at 2:19 PM

## 2023-01-17 NOTE — PATIENT INSTRUCTIONS
We will submit to your insurance to see if they will cover varicose vein surgery.  You would be getting right leg VNUS ablation and stab phlebectomies greater than 20.

## 2023-03-02 ENCOUNTER — TELEPHONE (OUTPATIENT)
Dept: SURGERY | Age: 63
End: 2023-03-02

## 2023-03-02 NOTE — TELEPHONE ENCOUNTER
PT saw Dr Taylor and did her 3 months of wearing stockings.  PT was told that Dr Nunez would call the PT to set up surgery for her varicose veins.

## 2023-03-03 ENCOUNTER — OFFICE VISIT (OUTPATIENT)
Dept: FAMILY MEDICINE CLINIC | Age: 63
End: 2023-03-03
Payer: COMMERCIAL

## 2023-03-03 VITALS
TEMPERATURE: 98 F | HEIGHT: 70 IN | HEART RATE: 72 BPM | WEIGHT: 195 LBS | BODY MASS INDEX: 27.92 KG/M2 | DIASTOLIC BLOOD PRESSURE: 80 MMHG | SYSTOLIC BLOOD PRESSURE: 122 MMHG

## 2023-03-03 DIAGNOSIS — Z13.21 ENCOUNTER FOR VITAMIN DEFICIENCY SCREENING: ICD-10-CM

## 2023-03-03 DIAGNOSIS — Z12.31 BREAST CANCER SCREENING BY MAMMOGRAM: ICD-10-CM

## 2023-03-03 DIAGNOSIS — E78.2 MIXED HYPERLIPIDEMIA: ICD-10-CM

## 2023-03-03 DIAGNOSIS — R73.09 ELEVATED GLUCOSE: ICD-10-CM

## 2023-03-03 DIAGNOSIS — I10 ESSENTIAL HYPERTENSION, BENIGN: Primary | ICD-10-CM

## 2023-03-03 DIAGNOSIS — I10 ESSENTIAL HYPERTENSION, BENIGN: ICD-10-CM

## 2023-03-03 LAB
A/G RATIO: 1.5 (ref 1.1–2.2)
ALBUMIN SERPL-MCNC: 4.5 G/DL (ref 3.4–5)
ALP BLD-CCNC: 117 U/L (ref 40–129)
ALT SERPL-CCNC: 40 U/L (ref 10–40)
ANION GAP SERPL CALCULATED.3IONS-SCNC: 12 MMOL/L (ref 3–16)
AST SERPL-CCNC: 35 U/L (ref 15–37)
BILIRUB SERPL-MCNC: 1.6 MG/DL (ref 0–1)
BUN BLDV-MCNC: 16 MG/DL (ref 7–20)
CALCIUM SERPL-MCNC: 9.6 MG/DL (ref 8.3–10.6)
CHLORIDE BLD-SCNC: 103 MMOL/L (ref 99–110)
CHOLESTEROL, TOTAL: 198 MG/DL (ref 0–199)
CO2: 25 MMOL/L (ref 21–32)
CREAT SERPL-MCNC: 0.9 MG/DL (ref 0.6–1.2)
ESTIMATED AVERAGE GLUCOSE: 131.2 MG/DL
GFR SERPL CREATININE-BSD FRML MDRD: >60 ML/MIN/{1.73_M2}
GLUCOSE BLD-MCNC: 121 MG/DL (ref 70–99)
HBA1C MFR BLD: 6.2 %
HDLC SERPL-MCNC: 49 MG/DL (ref 40–60)
LDL CHOLESTEROL CALCULATED: 114 MG/DL
POTASSIUM SERPL-SCNC: 4.5 MMOL/L (ref 3.5–5.1)
SODIUM BLD-SCNC: 140 MMOL/L (ref 136–145)
TOTAL PROTEIN: 7.5 G/DL (ref 6.4–8.2)
TRIGL SERPL-MCNC: 175 MG/DL (ref 0–150)
TSH SERPL DL<=0.05 MIU/L-ACNC: 2.49 UIU/ML (ref 0.27–4.2)
VITAMIN D 25-HYDROXY: 18.6 NG/ML
VLDLC SERPL CALC-MCNC: 35 MG/DL

## 2023-03-03 PROCEDURE — 3079F DIAST BP 80-89 MM HG: CPT | Performed by: FAMILY MEDICINE

## 2023-03-03 PROCEDURE — 3074F SYST BP LT 130 MM HG: CPT | Performed by: FAMILY MEDICINE

## 2023-03-03 PROCEDURE — 99214 OFFICE O/P EST MOD 30 MIN: CPT | Performed by: FAMILY MEDICINE

## 2023-03-03 SDOH — ECONOMIC STABILITY: INCOME INSECURITY: HOW HARD IS IT FOR YOU TO PAY FOR THE VERY BASICS LIKE FOOD, HOUSING, MEDICAL CARE, AND HEATING?: NOT HARD AT ALL

## 2023-03-03 SDOH — ECONOMIC STABILITY: FOOD INSECURITY: WITHIN THE PAST 12 MONTHS, THE FOOD YOU BOUGHT JUST DIDN'T LAST AND YOU DIDN'T HAVE MONEY TO GET MORE.: NEVER TRUE

## 2023-03-03 SDOH — ECONOMIC STABILITY: FOOD INSECURITY: WITHIN THE PAST 12 MONTHS, YOU WORRIED THAT YOUR FOOD WOULD RUN OUT BEFORE YOU GOT MONEY TO BUY MORE.: NEVER TRUE

## 2023-03-03 SDOH — ECONOMIC STABILITY: HOUSING INSECURITY
IN THE LAST 12 MONTHS, WAS THERE A TIME WHEN YOU DID NOT HAVE A STEADY PLACE TO SLEEP OR SLEPT IN A SHELTER (INCLUDING NOW)?: NO

## 2023-03-03 ASSESSMENT — PATIENT HEALTH QUESTIONNAIRE - PHQ9
SUM OF ALL RESPONSES TO PHQ QUESTIONS 1-9: 0
SUM OF ALL RESPONSES TO PHQ QUESTIONS 1-9: 0
1. LITTLE INTEREST OR PLEASURE IN DOING THINGS: 0
SUM OF ALL RESPONSES TO PHQ QUESTIONS 1-9: 0
2. FEELING DOWN, DEPRESSED OR HOPELESS: 0
SUM OF ALL RESPONSES TO PHQ9 QUESTIONS 1 & 2: 0
SUM OF ALL RESPONSES TO PHQ QUESTIONS 1-9: 0

## 2023-03-03 ASSESSMENT — ENCOUNTER SYMPTOMS
SHORTNESS OF BREATH: 0
NAUSEA: 0
BLOOD IN STOOL: 0
VOMITING: 0
CHEST TIGHTNESS: 0
ABDOMINAL DISTENTION: 0
CONSTIPATION: 0
ABDOMINAL PAIN: 0
DIARRHEA: 0

## 2023-03-03 NOTE — PROGRESS NOTES
Subjective:      Patient ID: Zaheer Will is a 60 y.o. female. Chief Complaint   Patient presents with    6 Month Follow-Up     Hypertension, lipids - pt is fasting        Patient presents with:  6 Month Follow-Up: Hypertension, lipids - pt is fasting    Here for the above  She is actually well  She has no c/o  Same meds    YOB: 1960    Date of Visit:  3/3/2023     -- Peanut-Containing Drug Products -- Rash   -- Soybean-Containing Drug Products -- Rash   -- Ace Inhibitors    -- Aspirin     --  hives   -- Buckwheat    -- Cashew Nut Oil     --  Cashew protein   -- Covid-19 Mrna Vacc (Moderna)    -- Nsaids    -- Other     --  Hazelnut, chick peas    Current Outpatient Medications:  atorvastatin (LIPITOR) 20 MG tablet, TAKE ONE TABLET BY MOUTH DAILY, Disp: 30 tablet, Rfl: 5  amLODIPine (NORVASC) 10 MG tablet, TAKE ONE TABLET BY MOUTH DAILY, Disp: 30 tablet, Rfl: 5  azelastine (ASTELIN) 0.1 % nasal spray, azelastine 137 mcg (0.1 %) nasal spray aerosol, Disp: , Rfl:   Fluticasone Furoate (ARNUITY ELLIPTA IN), Inhale 1 puff into the lungs daily Indications: pt is using 200 , Disp: , Rfl:   Phenylephrine HCl (SUDAFED PE CONGESTION PO), Take 1 tablet by mouth as needed, Disp: , Rfl:   acetaminophen (TYLENOL) 325 MG tablet, Take 650 mg by mouth every 6 hours as needed for Pain, Disp: , Rfl:   fluticasone (FLONASE) 50 MCG/ACT nasal spray, 1 spray by Nasal route daily. , Disp: , Rfl:   metoprolol (TOPROL-XL) 25 MG XL tablet, Take 25 mg by mouth daily. , Disp: , Rfl:   EPINEPHrine (EPIPEN) 0.3 MG/0.3ML JO injection, Inject 0.3 mg into the muscle as needed. Use as directed for allergic reaction, Disp: , Rfl:   spironolactone (ALDACTONE) 25 MG tablet, Take 25 mg by mouth 2 times daily. , Disp: , Rfl:   levalbuterol (XOPENEX) 0.63 MG/3ML nebulization, 1 ampule every 4 hours as needed , Disp: , Rfl:   levocetirizine (XYZAL) 5 MG tablet, Take 5 mg by mouth nightly , Disp: , Rfl:     No current facility-administered medications for this visit.      --------------------------               03/03/23                     0906        --------------------------   BP:          122/80        Site:    Left Upper Arm    Position:    Sitting        Cuff Size:  Large Adult      Pulse:         72          Temp:   98 °F (36.7 °C)    TempSrc:    Temporal       Weight: 195 lb (88.5 kg)   Height: 5' 10\" (1.778 m)  --------------------------  Body mass index is 27.98 kg/m². Wt Readings from Last 3 Encounters:  03/03/23 : 195 lb (88.5 kg)  01/17/23 : 193 lb (87.5 kg)  12/21/22 : 198 lb (89.8 kg)    BP Readings from Last 3 Encounters:  03/03/23 : 122/80  01/17/23 : (!) 146/80  12/21/22 : 125/75              Review of Systems   Constitutional:  Negative for appetite change, chills, fever and unexpected weight change. Respiratory:  Negative for chest tightness and shortness of breath. Cardiovascular:  Negative for chest pain, palpitations and leg swelling. Gastrointestinal:  Negative for abdominal distention, abdominal pain, blood in stool, constipation, diarrhea, nausea and vomiting. Genitourinary:  Negative for difficulty urinating. Neurological:  Negative for headaches. Objective:   Physical Exam  Constitutional:       General: She is not in acute distress. Appearance: Normal appearance. She is well-developed. She is not diaphoretic. Cardiovascular:      Rate and Rhythm: Normal rate and regular rhythm. Heart sounds: Normal heart sounds. No murmur heard. No friction rub. No gallop. Comments:     Pulmonary:      Effort: Pulmonary effort is normal. No tachypnea, accessory muscle usage or respiratory distress. Breath sounds: Normal breath sounds. No decreased breath sounds, wheezing, rhonchi or rales. Abdominal:      General: Bowel sounds are normal. There is no distension or abdominal bruit. Palpations: Abdomen is soft. There is no mass. Tenderness: There is no abdominal tenderness. There is no guarding. Lymphadenopathy:      Cervical: No cervical adenopathy. Upper Body:      Right upper body: No supraclavicular adenopathy. Left upper body: No supraclavicular adenopathy. Skin:     General: Skin is warm and dry. Coloration: Skin is not pale. Nails: There is no clubbing. Neurological:      Mental Status: She is alert. Assessment:       Diagnosis Orders   1. Essential hypertension, benign  Comprehensive Metabolic Panel    Lipid Panel    TSH    Vitamin D 25 Hydroxy      2. Mixed hyperlipidemia  Comprehensive Metabolic Panel    Lipid Panel    TSH    Vitamin D 25 Hydroxy      3. Breast cancer screening by mammogram  LUIS DIGITAL SCREEN W OR WO CAD BILATERAL      4. Elevated glucose  Hemoglobin A1C      5.  Encounter for vitamin deficiency screening  Vitamin D 25 Hydroxy        Overall well  No change  Continue same therapy      Plan:      Do remember to get the mammogram   Continue the medicines and the diet   Consider the shingle vaccine   See in 6 months        Bere Malcolm MD

## 2023-03-03 NOTE — PATIENT INSTRUCTIONS
Do remember to get the mammogram   Continue the medicines and the diet   Consider the shingle vaccine   See in 6 months

## 2023-03-09 ENCOUNTER — OFFICE VISIT (OUTPATIENT)
Dept: VASCULAR SURGERY | Age: 63
End: 2023-03-09
Payer: COMMERCIAL

## 2023-03-09 VITALS — BODY MASS INDEX: 27.92 KG/M2 | WEIGHT: 195 LBS | HEIGHT: 70 IN

## 2023-03-09 DIAGNOSIS — I83.891 SYMPTOMATIC VARICOSE VEINS OF RIGHT LOWER EXTREMITY: Primary | ICD-10-CM

## 2023-03-09 PROCEDURE — 99214 OFFICE O/P EST MOD 30 MIN: CPT | Performed by: STUDENT IN AN ORGANIZED HEALTH CARE EDUCATION/TRAINING PROGRAM

## 2023-03-09 NOTE — PROGRESS NOTES
Obdulio Castro (:  1960) is a 60 y.o. female,Established patient, here for evaluation of the following chief complaint(s):  Establish Care and Surgical Consult         ASSESSMENT/PLAN:  1. Symptomatic varicose veins of right lower extremity    This is a 60year old female patient who presents to the office today for evaluation of symptomatic varicose veins. She has discomfort in her leg that is multifactorial however the varicose veins symptoms have preceded the lumbar radiculopathy therefore she would likely benefit from right lower extremity greater saphenous ablation with stab phlebectomies. Will begin the pre-authorization process and plan for surgery. All questions answered. Risks of DVT, neuralgia, pain, infection, bleeding reviewed with patient. Subjective   SUBJECTIVE/OBJECTIVE:  This is a 60year old female patient who presents to the office today for evaluation of symptomatic right lower extremity varicose veins. She was originally seen by Dr. Ina Gamboa. She has been dealing with right lower extremity pain, occasional swelling, heaviness, ache and tenderness along her varicose veins for years. She used stockings for 3 months with ill effect. She denies ulceration. She does have lumbar radiculopathy that causes severe pain along the sciatic/tibial nerve distribution. Objective   Physical Exam  Constitutional:       Appearance: Normal appearance. Cardiovascular:      Rate and Rhythm: Normal rate and regular rhythm. Pulmonary:      Effort: Pulmonary effort is normal. No respiratory distress. Skin:     General: Skin is warm and dry. Comments: Large varicose vein cluster along medial calf and moving along the anterior leg towards the ankle following described tenderness    Neurological:      Mental Status: She is alert.         Lara Da Silva, DO, FACS, FSVS, [address] [facility]

## 2023-03-10 ENCOUNTER — TELEPHONE (OUTPATIENT)
Dept: FAMILY MEDICINE CLINIC | Age: 63
End: 2023-03-10

## 2023-03-10 NOTE — TELEPHONE ENCOUNTER
Patient was just seen on 3/3/23 and she saw Dr Thomas Mendez today she was advised to call you and see if the 3/3/23 office visit would cover her for pre op for right lower  varicose veins surgery. Or does she need to come back in. They will schedule her as soon she knows.       Please call

## 2023-03-17 ENCOUNTER — HOSPITAL ENCOUNTER (OUTPATIENT)
Age: 63
Discharge: HOME | End: 2023-03-17
Payer: COMMERCIAL

## 2023-03-17 ENCOUNTER — OFFICE VISIT (OUTPATIENT)
Dept: FAMILY MEDICINE CLINIC | Age: 63
End: 2023-03-17

## 2023-03-17 VITALS
TEMPERATURE: 97.3 F | DIASTOLIC BLOOD PRESSURE: 80 MMHG | SYSTOLIC BLOOD PRESSURE: 124 MMHG | HEIGHT: 70 IN | BODY MASS INDEX: 27.77 KG/M2 | WEIGHT: 194 LBS | HEART RATE: 84 BPM

## 2023-03-17 DIAGNOSIS — I83.811 VARICOSE VEINS OF RIGHT LOWER EXTREMITY WITH PAIN: ICD-10-CM

## 2023-03-17 DIAGNOSIS — D75.1 POLYCYTHEMIA: ICD-10-CM

## 2023-03-17 DIAGNOSIS — E78.2 MIXED HYPERLIPIDEMIA: ICD-10-CM

## 2023-03-17 DIAGNOSIS — Z01.818 PREOP EXAMINATION: Primary | ICD-10-CM

## 2023-03-17 DIAGNOSIS — Z01.818 PREOP EXAMINATION: ICD-10-CM

## 2023-03-17 DIAGNOSIS — I10 ESSENTIAL HYPERTENSION, BENIGN: ICD-10-CM

## 2023-03-17 PROCEDURE — 93005 ELECTROCARDIOGRAM TRACING: CPT

## 2023-03-17 ASSESSMENT — ENCOUNTER SYMPTOMS
SORE THROAT: 0
TROUBLE SWALLOWING: 0
VOICE CHANGE: 0
BLOOD IN STOOL: 0
DIARRHEA: 0
CONSTIPATION: 0
CHEST TIGHTNESS: 0
NAUSEA: 0
ABDOMINAL DISTENTION: 0
SHORTNESS OF BREATH: 0
ABDOMINAL PAIN: 0
VOMITING: 0
COUGH: 0

## 2023-03-17 NOTE — PATIENT INSTRUCTIONS
Take the amlodipine with just enough water to get the pill down as soon as you get out of bed that morning   No supplements for a week before the surgery (1) Outpatient Area

## 2023-03-17 NOTE — PROGRESS NOTES
Subjective:      Patient ID: Fredy San is a 60 y.o. female. Chief Complaint   Patient presents with    Pre-op Exam     RADIOFREQUENCY ABLATION OF RIGHT GREATER SAPHENOUS VEIN WITH STAB PHLEBECTOMIES on 3- by Dr. Jd Arango at [facility].         Patient presents with:  Pre-op Exam: RADIOFREQUENCY ABLATION OF RIGHT GREATER SAPHENOUS VEIN WITH STAB PHLEBECTOMIES on 3- by Dr. Jd Arango at [facility].     Here for the above  She is well     Allergies:   -- Peanut-Containing Drug Products -- Rash   -- Soybean-Containing Drug Products -- Rash   -- Ace Inhibitors    -- Aspirin     --  hives   -- Buckwheat    -- Cashew Nut Oil     --  Cashew protein   -- Covid-19 Mrna Vacc (Moderna)    -- Nsaids    -- Other     --  Hazelnut, chick peas    No tobacco hx. One ETOH drink per week      height is 5' 10\" (1.778 m) and weight is 194 lb (88 kg). Her temporal temperature is 97.3 °F (36.3 °C). Her blood pressure is 124/80 and her pulse is 84. Current Outpatient Medications:   ·  atorvastatin (LIPITOR) 20 MG tablet, TAKE ONE TABLET BY MOUTH DAILY  ·  amLODIPine (NORVASC) 10 MG tablet, TAKE ONE TABLET BY MOUTH DAILY  ·  azelastine (ASTELIN) 0.1 % nasal spray, azelastine 137 mcg (0.1 %) nasal spray aerosol,   ·  Fluticasone Furoate (ARNUITY ELLIPTA IN), Inhale 1 puff into the lungs daily Indications: pt is using 200   ·  Phenylephrine HCl (SUDAFED PE CONGESTION PO), Take 1 tablet by mouth as needed,  ·  acetaminophen (TYLENOL) 325 MG tablet, Take 650 mg by mouth every 6 hours as needed for Pain,  ·  fluticasone (FLONASE) 50 MCG/ACT nasal spray, 1 spray by Nasal route daily. ·  metoprolol (TOPROL-XL) 25 MG XL tablet, Take 25 mg by mouth daily  ·  EPINEPHrine (EPIPEN) 0.3 MG/0.3ML JO injection, Inject 0.3 mg into the muscle as needed.  Use as directed for allergic reaction  ·  spironolactone (ALDACTONE) 25 MG tablet, Take 25 mg by mouth 2 times daily  ·  levalbuterol (XOPENEX) 0.63 MG/3ML nebulization, 1 ampule every 4 hours as needed  ·  levocetirizine (XYZAL) 5 MG tablet, Take 5 mg by mouth nightly    Past Surgical History:  No date: CHOLECYSTECTOMY  11/13/2008: COLONOSCOPY      Comment:  normal dr Lucie Escalona, had previous polyp in 2003  8/31/15: COLONOSCOPY      Comment:  polyps, dr Danelle Quiroga, repeat 3 years  11/29/2018: COLONOSCOPY; N/A      Comment:  COLONOSCOPY POLYPECTOMY SNARE/COLD BIOPSY performed by                Gaurav Centeno MD at [address] [place] [address]  5/2003: [facility]  7/2013: OTHER SURGICAL HISTORY      Comment:  uterine ablation  10/28/2021: PAIN MANAGEMENT PROCEDURE; Left      Comment:  LEFT L4 AND L5 TRANSFORAMINAL EPIDURAL STEROID INJECTION               WITH FLUOROSCOPY ([id number], [id number]) performed by Abrahan Woodall MD at [address] [facility]  [facility]  Left      Comment:  LEFT L4 AND L5 TRANSFORAMINAL EPIDURAL STEROID INJECTION               WITH FLUOROSCOPY performed by Shaheen Coats MD at [address]  2/10/2022: PAIN MANAGEMENT PROCEDURE; Bilateral      Comment:  BILATERAL S-1 TRANSFORAMINAL EPIDURAL STEROID INJECTION                WITH FLUOROSCOPY performed by Shaheen Coats MD at [address]  8/31/2022: PAIN MANAGEMENT PROCEDURE; Bilateral      Comment:  BILATERAL SACRAL1 TRANSFORAMINAL EPIDURAL STEROID                INJECTION WITH FLUOROSCOPY performed by Shaheen Coats MD at [address] [facility]  12/21/2022: PAIN MANAGEMENT PROCEDURE; Bilateral      Comment:  BILATERAL S1 TRANSFORAMINAL EPIDURAL STEROID INJECTION                WITH FLUOROSCOPY performed by Shaheen Coats MD at [facility]    No problems with anesthesia    Past Medical History:  No date: Aldosteronism ([address]  No date: Asthma  No date: HYPERCHOLESTERAEMIA  No date: Hypertension  No date: Psychogenic nonepileptic seizure    Family hx  No anesthesia concerns           Review of Systems   Constitutional:  Negative for appetite change, chills, fever and unexpected weight change. HENT:  Negative for sore throat, trouble swallowing and voice change. No loose teeth no front cap/crown   Respiratory:  Negative for cough, chest tightness and shortness of breath. Cardiovascular:  Negative for chest pain, palpitations and leg swelling. Exercise by walking, carry items up stairs, vacuums all no cp no sob . No hx of heart disease    Gastrointestinal:  Negative for abdominal distention, abdominal pain, blood in stool, constipation, diarrhea, nausea and vomiting. No pb with swallowing no gerd no hx of hepatitis   Genitourinary:  Negative for difficulty urinating, dysuria and hematuria. Musculoskeletal:  Negative for neck pain. Hx of chronic back pain    Skin:  Negative for rash. Neurological:  Negative for dizziness, syncope and headaches. Stress related seizure in past    Hematological:  Negative for adenopathy. Does not bruise/bleed easily. She has not had blood clots     Objective:   Physical Exam  Constitutional:       General: She is not in acute distress. Appearance: Normal appearance. She is well-developed. She is not ill-appearing or diaphoretic. HENT:      Head: Normocephalic and atraumatic. Right Ear: Tympanic membrane and ear canal normal.      Left Ear: Tympanic membrane and ear canal normal.      Nose: Nose normal.      Mouth/Throat:      Lips: Pink. Mouth: Mucous membranes are moist. No oral lesions. Pharynx: Oropharynx is clear. Uvula midline. Eyes:      General: No scleral icterus. Neck:      Thyroid: No thyroid mass or thyromegaly. Cardiovascular:      Rate and Rhythm: Normal rate and regular rhythm. Heart sounds: Normal heart sounds. No murmur heard. No friction rub. No gallop. Comments:     Pulmonary:      Effort: Pulmonary effort is normal. No tachypnea, accessory muscle usage or respiratory distress. Breath sounds: Normal breath sounds. No decreased breath sounds, wheezing, rhonchi or rales. Abdominal:      General: Bowel sounds are normal. There is no distension or abdominal bruit. Palpations: Abdomen is soft. There is no hepatomegaly, splenomegaly, mass or pulsatile mass. Tenderness: There is no abdominal tenderness. There is no guarding. Musculoskeletal:      Cervical back: Neck supple. Lymphadenopathy:      Head:      Right side of head: No submental or submandibular adenopathy. Left side of head: No submental or submandibular adenopathy. Cervical: No cervical adenopathy. Upper Body:      Right upper body: No supraclavicular adenopathy. Left upper body: No supraclavicular adenopathy. Skin:     General: Skin is warm and dry. Coloration: Skin is not pale. Nails: There is no clubbing. Neurological:      Mental Status: She is alert. Assessment:       Diagnosis Orders   1. Preop examination  EKG 12 lead      2. Varicose veins of right lower extremity with pain        3. Essential hypertension, benign  EKG 12 lead      4. Mixed hyperlipidemia        5.  Polycythemia          Tentative ok for the surgery  Labs are pending  Will obtain ekg      Plan:      Take the amlodipine with just enough water to get the pill down as soon as you get out of bed that morning   No supplements for a week before the surgery         Maxim Mark MD

## 2023-03-18 LAB
ANION GAP SERPL CALCULATED.3IONS-SCNC: 12 MMOL/L (ref 3–16)
BUN SERPL-MCNC: 17 MG/DL (ref 7–20)
CALCIUM SERPL-MCNC: 9.6 MG/DL (ref 8.3–10.6)
CHLORIDE SERPL-SCNC: 101 MMOL/L (ref 99–110)
CO2 SERPL-SCNC: 25 MMOL/L (ref 21–32)
CREAT SERPL-MCNC: 0.9 MG/DL (ref 0.6–1.2)
DEPRECATED RDW RBC AUTO: 17.5 % (ref 12.4–15.4)
EKG ATRIAL RATE: 72 BPM
EKG DIAGNOSIS: NORMAL
EKG P AXIS: 65 DEGREES
EKG P-R INTERVAL: 176 MS
EKG Q-T INTERVAL: 376 MS
EKG QRS DURATION: 78 MS
EKG QTC CALCULATION (BAZETT): 411 MS
EKG R AXIS: 9 DEGREES
EKG T AXIS: 13 DEGREES
EKG VENTRICULAR RATE: 72 BPM
GFR SERPLBLD CREATININE-BSD FMLA CKD-EPI: >60 ML/MIN/{1.73_M2}
GLUCOSE SERPL-MCNC: 114 MG/DL (ref 70–99)
HCT VFR BLD AUTO: 37.1 % (ref 36–48)
HGB BLD-MCNC: 11.8 G/DL (ref 12–16)
MCH RBC QN AUTO: 24.2 PG (ref 26–34)
MCHC RBC AUTO-ENTMCNC: 31.7 G/DL (ref 31–36)
MCV RBC AUTO: 76.4 FL (ref 80–100)
PLATELET # BLD AUTO: 269 K/UL (ref 135–450)
PMV BLD AUTO: 8.9 FL (ref 5–10.5)
POTASSIUM SERPL-SCNC: 4.6 MMOL/L (ref 3.5–5.1)
RBC # BLD AUTO: 4.86 M/UL (ref 4–5.2)
SODIUM SERPL-SCNC: 138 MMOL/L (ref 136–145)
WBC # BLD AUTO: 5 K/UL (ref 4–11)

## 2023-03-18 PROCEDURE — 93010 ELECTROCARDIOGRAM REPORT: CPT | Performed by: INTERNAL MEDICINE

## 2023-03-21 RX ORDER — FAMOTIDINE 10 MG
10 TABLET ORAL DAILY
COMMUNITY

## 2023-03-21 RX ORDER — CHOLECALCIFEROL (VITAMIN D3) 1250 MCG
CAPSULE ORAL
COMMUNITY

## 2023-03-22 ENCOUNTER — TELEPHONE (OUTPATIENT)
Dept: SURGERY | Age: 63
End: 2023-03-22

## 2023-03-22 NOTE — TELEPHONE ENCOUNTER
Has no information regarding her SX on Friday. Call to advise. Not available between 11:30-1pm today.

## 2023-03-22 NOTE — TELEPHONE ENCOUNTER
Clarified with pt. She had all information about her surgery, but wanted to clarify medications. Smallest sip of water possible. Pt verbalized understanding.

## 2023-03-23 ENCOUNTER — ANESTHESIA EVENT (OUTPATIENT)
Dept: OPERATING ROOM | Age: 63
End: 2023-03-23
Payer: COMMERCIAL

## 2023-03-24 ENCOUNTER — HOSPITAL ENCOUNTER (OUTPATIENT)
Age: 63
Setting detail: OUTPATIENT SURGERY
Discharge: HOME | End: 2023-03-24
Attending: STUDENT IN AN ORGANIZED HEALTH CARE EDUCATION/TRAINING PROGRAM | Admitting: STUDENT IN AN ORGANIZED HEALTH CARE EDUCATION/TRAINING PROGRAM
Payer: COMMERCIAL

## 2023-03-24 ENCOUNTER — ANESTHESIA (OUTPATIENT)
Dept: OPERATING ROOM | Age: 63
End: 2023-03-24
Payer: COMMERCIAL

## 2023-03-24 VITALS
HEIGHT: 70 IN | RESPIRATION RATE: 20 BRPM | WEIGHT: 194.12 LBS | OXYGEN SATURATION: 96 % | SYSTOLIC BLOOD PRESSURE: 121 MMHG | HEART RATE: 78 BPM | TEMPERATURE: 97.8 F | DIASTOLIC BLOOD PRESSURE: 70 MMHG | BODY MASS INDEX: 27.79 KG/M2

## 2023-03-24 DIAGNOSIS — M79.604 LEG PAIN, CENTRAL, RIGHT: Primary | ICD-10-CM

## 2023-03-24 PROBLEM — I83.819 VARICOSE VEINS WITH PAIN: Status: ACTIVE | Noted: 2022-10-14

## 2023-03-24 LAB
ANION GAP SERPL CALCULATED.3IONS-SCNC: 15 MMOL/L (ref 3–16)
BUN SERPL-MCNC: 16 MG/DL (ref 7–20)
CALCIUM SERPL-MCNC: 9.4 MG/DL (ref 8.3–10.6)
CHLORIDE SERPL-SCNC: 105 MMOL/L (ref 99–110)
CO2 SERPL-SCNC: 21 MMOL/L (ref 21–32)
CREAT SERPL-MCNC: 1 MG/DL (ref 0.6–1.2)
DEPRECATED RDW RBC AUTO: 18.2 % (ref 12.4–15.4)
GFR SERPLBLD CREATININE-BSD FMLA CKD-EPI: >60 ML/MIN/{1.73_M2}
GLUCOSE SERPL-MCNC: 134 MG/DL (ref 70–99)
HCT VFR BLD AUTO: 39.1 % (ref 36–48)
HGB BLD-MCNC: 12.1 G/DL (ref 12–16)
MCH RBC QN AUTO: 23.6 PG (ref 26–34)
MCHC RBC AUTO-ENTMCNC: 30.9 G/DL (ref 31–36)
MCV RBC AUTO: 76.4 FL (ref 80–100)
PLATELET # BLD AUTO: 280 K/UL (ref 135–450)
PMV BLD AUTO: 8.1 FL (ref 5–10.5)
POTASSIUM SERPL-SCNC: 4 MMOL/L (ref 3.5–5.1)
RBC # BLD AUTO: 5.11 M/UL (ref 4–5.2)
SODIUM SERPL-SCNC: 141 MMOL/L (ref 136–145)
WBC # BLD AUTO: 5.5 K/UL (ref 4–11)

## 2023-03-24 PROCEDURE — C1894 INTRO/SHEATH, NON-LASER: HCPCS | Performed by: STUDENT IN AN ORGANIZED HEALTH CARE EDUCATION/TRAINING PROGRAM

## 2023-03-24 PROCEDURE — 2500000003 HC RX 250 WO HCPCS

## 2023-03-24 PROCEDURE — 6370000000 HC RX 637 (ALT 250 FOR IP): Performed by: ANESTHESIOLOGY

## 2023-03-24 PROCEDURE — 2580000003 HC RX 258: Performed by: STUDENT IN AN ORGANIZED HEALTH CARE EDUCATION/TRAINING PROGRAM

## 2023-03-24 PROCEDURE — 6360000002 HC RX W HCPCS

## 2023-03-24 PROCEDURE — 2580000003 HC RX 258: Performed by: ANESTHESIOLOGY

## 2023-03-24 PROCEDURE — 3600000005 HC SURGERY LEVEL 5 BASE: Performed by: STUDENT IN AN ORGANIZED HEALTH CARE EDUCATION/TRAINING PROGRAM

## 2023-03-24 PROCEDURE — 80048 BASIC METABOLIC PNL TOTAL CA: CPT

## 2023-03-24 PROCEDURE — 2709999900 HC NON-CHARGEABLE SUPPLY: Performed by: STUDENT IN AN ORGANIZED HEALTH CARE EDUCATION/TRAINING PROGRAM

## 2023-03-24 PROCEDURE — 2500000003 HC RX 250 WO HCPCS: Performed by: STUDENT IN AN ORGANIZED HEALTH CARE EDUCATION/TRAINING PROGRAM

## 2023-03-24 PROCEDURE — 85027 COMPLETE CBC AUTOMATED: CPT

## 2023-03-24 PROCEDURE — 7100000010 HC PHASE II RECOVERY - FIRST 15 MIN: Performed by: STUDENT IN AN ORGANIZED HEALTH CARE EDUCATION/TRAINING PROGRAM

## 2023-03-24 PROCEDURE — A4217 STERILE WATER/SALINE, 500 ML: HCPCS | Performed by: STUDENT IN AN ORGANIZED HEALTH CARE EDUCATION/TRAINING PROGRAM

## 2023-03-24 PROCEDURE — 3700000001 HC ADD 15 MINUTES (ANESTHESIA): Performed by: STUDENT IN AN ORGANIZED HEALTH CARE EDUCATION/TRAINING PROGRAM

## 2023-03-24 PROCEDURE — 3600000015 HC SURGERY LEVEL 5 ADDTL 15MIN: Performed by: STUDENT IN AN ORGANIZED HEALTH CARE EDUCATION/TRAINING PROGRAM

## 2023-03-24 PROCEDURE — 7100000000 HC PACU RECOVERY - FIRST 15 MIN: Performed by: STUDENT IN AN ORGANIZED HEALTH CARE EDUCATION/TRAINING PROGRAM

## 2023-03-24 PROCEDURE — 6360000002 HC RX W HCPCS: Performed by: STUDENT IN AN ORGANIZED HEALTH CARE EDUCATION/TRAINING PROGRAM

## 2023-03-24 PROCEDURE — C1769 GUIDE WIRE: HCPCS | Performed by: STUDENT IN AN ORGANIZED HEALTH CARE EDUCATION/TRAINING PROGRAM

## 2023-03-24 PROCEDURE — 7100000011 HC PHASE II RECOVERY - ADDTL 15 MIN: Performed by: STUDENT IN AN ORGANIZED HEALTH CARE EDUCATION/TRAINING PROGRAM

## 2023-03-24 PROCEDURE — 3700000000 HC ANESTHESIA ATTENDED CARE: Performed by: STUDENT IN AN ORGANIZED HEALTH CARE EDUCATION/TRAINING PROGRAM

## 2023-03-24 PROCEDURE — 7100000001 HC PACU RECOVERY - ADDTL 15 MIN: Performed by: STUDENT IN AN ORGANIZED HEALTH CARE EDUCATION/TRAINING PROGRAM

## 2023-03-24 RX ORDER — SODIUM CHLORIDE 0.9 % (FLUSH) 0.9 %
5-40 SYRINGE (ML) INJECTION PRN
Status: DISCONTINUED | OUTPATIENT
Start: 2023-03-24 | End: 2023-03-24 | Stop reason: HOSPADM

## 2023-03-24 RX ORDER — MAGNESIUM HYDROXIDE 1200 MG/15ML
LIQUID ORAL CONTINUOUS PRN
Status: DISCONTINUED | OUTPATIENT
Start: 2023-03-24 | End: 2023-03-24 | Stop reason: HOSPADM

## 2023-03-24 RX ORDER — SODIUM CHLORIDE 9 MG/ML
INJECTION, SOLUTION INTRAVENOUS PRN
Status: DISCONTINUED | OUTPATIENT
Start: 2023-03-24 | End: 2023-03-24 | Stop reason: HOSPADM

## 2023-03-24 RX ORDER — ONDANSETRON 2 MG/ML
INJECTION INTRAMUSCULAR; INTRAVENOUS PRN
Status: DISCONTINUED | OUTPATIENT
Start: 2023-03-24 | End: 2023-03-24 | Stop reason: SDUPTHER

## 2023-03-24 RX ORDER — SODIUM CHLORIDE 0.9 % (FLUSH) 0.9 %
5-40 SYRINGE (ML) INJECTION PRN
Status: DISCONTINUED | OUTPATIENT
Start: 2023-03-24 | End: 2023-03-24 | Stop reason: SDUPTHER

## 2023-03-24 RX ORDER — LABETALOL HYDROCHLORIDE 5 MG/ML
5 INJECTION, SOLUTION INTRAVENOUS
Status: DISCONTINUED | OUTPATIENT
Start: 2023-03-24 | End: 2023-03-24 | Stop reason: HOSPADM

## 2023-03-24 RX ORDER — EPHEDRINE SULFATE/0.9% NACL/PF 50 MG/5 ML
SYRINGE (ML) INTRAVENOUS PRN
Status: DISCONTINUED | OUTPATIENT
Start: 2023-03-24 | End: 2023-03-24 | Stop reason: SDUPTHER

## 2023-03-24 RX ORDER — FENTANYL CITRATE 50 UG/ML
INJECTION, SOLUTION INTRAMUSCULAR; INTRAVENOUS PRN
Status: DISCONTINUED | OUTPATIENT
Start: 2023-03-24 | End: 2023-03-24 | Stop reason: SDUPTHER

## 2023-03-24 RX ORDER — FENTANYL CITRATE 50 UG/ML
25 INJECTION, SOLUTION INTRAMUSCULAR; INTRAVENOUS EVERY 5 MIN PRN
Status: DISCONTINUED | OUTPATIENT
Start: 2023-03-24 | End: 2023-03-24 | Stop reason: HOSPADM

## 2023-03-24 RX ORDER — DIPHENHYDRAMINE HYDROCHLORIDE 50 MG/ML
12.5 INJECTION INTRAMUSCULAR; INTRAVENOUS
Status: DISCONTINUED | OUTPATIENT
Start: 2023-03-24 | End: 2023-03-24 | Stop reason: HOSPADM

## 2023-03-24 RX ORDER — ONDANSETRON 2 MG/ML
4 INJECTION INTRAMUSCULAR; INTRAVENOUS
Status: DISCONTINUED | OUTPATIENT
Start: 2023-03-24 | End: 2023-03-24 | Stop reason: HOSPADM

## 2023-03-24 RX ORDER — MEPERIDINE HYDROCHLORIDE 25 MG/ML
12.5 INJECTION INTRAMUSCULAR; INTRAVENOUS; SUBCUTANEOUS EVERY 5 MIN PRN
Status: DISCONTINUED | OUTPATIENT
Start: 2023-03-24 | End: 2023-03-24 | Stop reason: HOSPADM

## 2023-03-24 RX ORDER — SODIUM CHLORIDE 0.9 % (FLUSH) 0.9 %
5-40 SYRINGE (ML) INJECTION EVERY 12 HOURS SCHEDULED
Status: DISCONTINUED | OUTPATIENT
Start: 2023-03-24 | End: 2023-03-24 | Stop reason: SDUPTHER

## 2023-03-24 RX ORDER — OXYCODONE HYDROCHLORIDE 5 MG/1
5 TABLET ORAL
Status: COMPLETED | OUTPATIENT
Start: 2023-03-24 | End: 2023-03-24

## 2023-03-24 RX ORDER — PROPOFOL 10 MG/ML
INJECTION, EMULSION INTRAVENOUS PRN
Status: DISCONTINUED | OUTPATIENT
Start: 2023-03-24 | End: 2023-03-24 | Stop reason: SDUPTHER

## 2023-03-24 RX ORDER — SODIUM CHLORIDE 0.9 % (FLUSH) 0.9 %
5-40 SYRINGE (ML) INJECTION EVERY 12 HOURS SCHEDULED
Status: DISCONTINUED | OUTPATIENT
Start: 2023-03-24 | End: 2023-03-24 | Stop reason: HOSPADM

## 2023-03-24 RX ORDER — SODIUM CHLORIDE 9 MG/ML
INJECTION, SOLUTION INTRAVENOUS PRN
Status: DISCONTINUED | OUTPATIENT
Start: 2023-03-24 | End: 2023-03-24 | Stop reason: SDUPTHER

## 2023-03-24 RX ORDER — MIDAZOLAM HYDROCHLORIDE 1 MG/ML
INJECTION INTRAMUSCULAR; INTRAVENOUS PRN
Status: DISCONTINUED | OUTPATIENT
Start: 2023-03-24 | End: 2023-03-24 | Stop reason: SDUPTHER

## 2023-03-24 RX ORDER — DEXAMETHASONE SODIUM PHOSPHATE 4 MG/ML
INJECTION, SOLUTION INTRA-ARTICULAR; INTRALESIONAL; INTRAMUSCULAR; INTRAVENOUS; SOFT TISSUE PRN
Status: DISCONTINUED | OUTPATIENT
Start: 2023-03-24 | End: 2023-03-24 | Stop reason: SDUPTHER

## 2023-03-24 RX ORDER — OXYCODONE HYDROCHLORIDE 5 MG/1
5 TABLET ORAL EVERY 6 HOURS PRN
Qty: 12 TABLET | Refills: 0 | Status: SHIPPED | OUTPATIENT
Start: 2023-03-24 | End: 2023-03-27

## 2023-03-24 RX ORDER — LIDOCAINE HYDROCHLORIDE 20 MG/ML
INJECTION, SOLUTION EPIDURAL; INFILTRATION; INTRACAUDAL; PERINEURAL PRN
Status: DISCONTINUED | OUTPATIENT
Start: 2023-03-24 | End: 2023-03-24 | Stop reason: SDUPTHER

## 2023-03-24 RX ADMIN — SODIUM CHLORIDE: 9 INJECTION, SOLUTION INTRAVENOUS at 10:48

## 2023-03-24 RX ADMIN — PROPOFOL 50 MG: 10 INJECTION, EMULSION INTRAVENOUS at 09:58

## 2023-03-24 RX ADMIN — FENTANYL CITRATE 25 MCG: 50 INJECTION INTRAMUSCULAR; INTRAVENOUS at 09:53

## 2023-03-24 RX ADMIN — DEXAMETHASONE SODIUM PHOSPHATE 10 MG: 4 INJECTION, SOLUTION INTRAMUSCULAR; INTRAVENOUS at 10:02

## 2023-03-24 RX ADMIN — Medication 5 MG: at 10:22

## 2023-03-24 RX ADMIN — MIDAZOLAM 2 MG: 1 INJECTION INTRAMUSCULAR; INTRAVENOUS at 09:44

## 2023-03-24 RX ADMIN — OXYCODONE 5 MG: 5 TABLET ORAL at 11:59

## 2023-03-24 RX ADMIN — SODIUM CHLORIDE: 9 INJECTION, SOLUTION INTRAVENOUS at 09:44

## 2023-03-24 RX ADMIN — Medication 5 MG: at 10:47

## 2023-03-24 RX ADMIN — Medication 5 MG: at 10:40

## 2023-03-24 RX ADMIN — Medication 10 MG: at 10:16

## 2023-03-24 RX ADMIN — FENTANYL CITRATE 25 MCG: 50 INJECTION INTRAMUSCULAR; INTRAVENOUS at 10:24

## 2023-03-24 RX ADMIN — PROPOFOL 200 MG: 10 INJECTION, EMULSION INTRAVENOUS at 09:49

## 2023-03-24 RX ADMIN — LIDOCAINE HYDROCHLORIDE 90 MG: 20 INJECTION, SOLUTION EPIDURAL; INFILTRATION; INTRACAUDAL; PERINEURAL at 09:49

## 2023-03-24 RX ADMIN — Medication 5 MG: at 10:27

## 2023-03-24 RX ADMIN — FENTANYL CITRATE 25 MCG: 50 INJECTION INTRAMUSCULAR; INTRAVENOUS at 09:49

## 2023-03-24 RX ADMIN — FENTANYL CITRATE 25 MCG: 50 INJECTION INTRAMUSCULAR; INTRAVENOUS at 10:01

## 2023-03-24 RX ADMIN — ONDANSETRON 4 MG: 2 INJECTION INTRAMUSCULAR; INTRAVENOUS at 10:49

## 2023-03-24 RX ADMIN — CEFAZOLIN 2000 MG: 2 INJECTION, POWDER, FOR SOLUTION INTRAMUSCULAR; INTRAVENOUS at 09:54

## 2023-03-24 RX ADMIN — Medication 5 MG: at 10:52

## 2023-03-24 ASSESSMENT — PAIN DESCRIPTION - PAIN TYPE: TYPE: SURGICAL PAIN

## 2023-03-24 ASSESSMENT — PAIN SCALES - GENERAL
PAINLEVEL_OUTOF10: 6
PAINLEVEL_OUTOF10: 0

## 2023-03-24 ASSESSMENT — PAIN DESCRIPTION - DESCRIPTORS: DESCRIPTORS: SORE

## 2023-03-24 ASSESSMENT — LIFESTYLE VARIABLES: SMOKING_STATUS: 0

## 2023-03-24 ASSESSMENT — ENCOUNTER SYMPTOMS: SHORTNESS OF BREATH: 0

## 2023-03-24 ASSESSMENT — PAIN DESCRIPTION - ONSET: ONSET: PROGRESSIVE

## 2023-03-24 ASSESSMENT — PAIN DESCRIPTION - ORIENTATION: ORIENTATION: RIGHT

## 2023-03-24 ASSESSMENT — PAIN DESCRIPTION - FREQUENCY: FREQUENCY: CONTINUOUS

## 2023-03-24 ASSESSMENT — PAIN DESCRIPTION - LOCATION: LOCATION: LEG

## 2023-03-24 NOTE — BRIEF OP NOTE
Brief Postoperative Note      Patient: Bienvenido Mccallum  YOB: 1960  MRN: [id number]    Date of Procedure: 3/24/2023    Pre-Op Diagnosis: SYMPTOMATIC VARICOSE VEINS OF RIGHT LOWER EXTREMITY    Post-Op Diagnosis: Same       Procedure(s):  RADIOFREQUENCY ABLATION OF RIGHT GREATER SAPHENOUS VEIN WITH STAB PHLEBECTOMIES    Surgeon(s):  Tabatha Vazquez DO    Assistant:  Surgical Assistant: Jc Adams RN    Anesthesia: General    Estimated Blood Loss (mL): less than 50     Complications: None    Specimens:   * No specimens in log *    Implants:  * No implants in log *      Drains: * No LDAs found *    Findings: 23 phlebectomies    Electronically signed by Tabatha Vazquez DO on 3/24/2023 at 10:56 AM

## 2023-03-24 NOTE — H&P
H&P Update    I have reviewed the history and physical and examined the patient and find no relevant changes. I have reviewed with the patient and/or family the risks, benefits, and alternatives to the procedure. I HAVE PRESENTED REASONABLE ALTERNATIVES TO THE PATIENT'S PROPOSED CARE, TREATMENT, AND SERVICES. THE DISCUSSION I HAVE DONE ENCOMPASSED RISKS, BENEFITS, AND SIDE EFFECTS RELATED TO THE ALTERNATIVES AND THE RISKS RELATED TO NOT RECEIVING THE PROPOSED CARE, TREATMENT, SERVICES. Patient:  Alan Ramos   :   1960    Intended Procedure: right greater saphenous vein ablation, phlebectomies     Vitals:    23 0853   BP: 136/77   Pulse: 84   Resp: 18   Temp: 97.7 °F (36.5 °C)   SpO2: 98%       Nursing notes reviewed and agreed. Medications reviewed  Allergies:    Allergies   Allergen Reactions    Peanut-Containing Drug Products Rash    Soybean-Containing Drug Products Rash    Ace Inhibitors     Aspirin      hives    Buckwheat     Cashew Nut Oil      Cashew protein    Covid-19 Mrna Vacc (Moderna)     Nsaids     Other      Hazelnut, chick peas         Post Procedure plan: home    Alfred Felix DO, FACS, FSVS, [address] [facility]

## 2023-03-24 NOTE — ANESTHESIA PRE PROCEDURE
BP Readings from Last 3 Encounters:   03/24/23 136/77   03/17/23 124/80   03/03/23 122/80       NPO Status: Time of last liquid consumption: 2330                        Time of last solid consumption: 2330                        Date of last liquid consumption: 03/23/23                        Date of last solid food consumption: 03/23/23    BMI:   Wt Readings from Last 3 Encounters:   03/24/23 194 lb 1.8 oz (88 kg)   03/17/23 194 lb (88 kg)   03/09/23 195 lb (88.5 kg)     Body mass index is 27.85 kg/m². CBC:   Lab Results   Component Value Date/Time    WBC 5.0 03/18/2023 08:54 AM    RBC 4.86 03/18/2023 08:54 AM    RBC 5.23 02/20/2017 04:18 PM    HGB 11.8 03/18/2023 08:54 AM    HCT 37.1 03/18/2023 08:54 AM    MCV 76.4 03/18/2023 08:54 AM    RDW 17.5 03/18/2023 08:54 AM     03/18/2023 08:54 AM       CMP:   Lab Results   Component Value Date/Time     03/18/2023 08:54 AM    K 4.6 03/18/2023 08:54 AM     03/18/2023 08:54 AM    CO2 25 03/18/2023 08:54 AM    BUN 17 03/18/2023 08:54 AM    CREATININE 0.9 03/18/2023 08:54 AM    GFRAA >60 09/06/2022 01:19 PM    GFRAA >60 03/21/2013 10:06 AM    AGRATIO 1.5 03/03/2023 09:32 AM    LABGLOM >60 03/18/2023 08:54 AM    GLUCOSE 114 03/18/2023 08:54 AM    PROT 7.5 03/03/2023 09:32 AM    PROT 7.5 03/21/2013 10:06 AM    CALCIUM 9.6 03/18/2023 08:54 AM    BILITOT 1.6 03/03/2023 09:32 AM    ALKPHOS 117 03/03/2023 09:32 AM    AST 35 03/03/2023 09:32 AM    ALT 40 03/03/2023 09:32 AM       POC Tests: No results for input(s): POCGLU, POCNA, POCK, POCCL, POCBUN, POCHEMO, POCHCT in the last 72 hours. Coags: No results found for: PROTIME, INR, APTT    HCG (If Applicable):   Lab Results   Component Value Date    PREGTESTUR negative 10/24/2014        ABGs: No results found for: PHART, PO2ART, KDA4AUJ, XBJ7ZRT, BEART, Y8PUKZPO     Type & Screen (If Applicable):  No results found for: LABABO, LABRH    Drug/Infectious Status (If Applicable):   No

## 2023-03-24 NOTE — DISCHARGE INSTRUCTIONS
Keep leg wrapped for 48 hours. Okay to remove if saturated. Follow up ultrasound schedule per the office. Please call office with any questions or concerns. Allow steri strips to fall off naturally.

## 2023-03-24 NOTE — PROGRESS NOTES
Assisted to and from BR to void. States she is ready to go home.
Reviewed d/c instructions with pt and daughter see mar see flow sheet all belongings with pt
screening and protocol:       * Admitted with diarrhea? [] YES    [x]  NO     *Prior history of C-Diff. In last 3 months? [] YES    [x]  NO     *Antibiotic use in the past 6-8 weeks? [x]  NO    []  YES                 If yes, which ANTIBIOTIC AND REASON______     *Prior hospitalization or nursing home in the last month? []  YES    [x]  NO        SAFETY FIRST. .call before you fall

## 2023-03-24 NOTE — ANESTHESIA POSTPROCEDURE EVALUATION
Department of Anesthesiology  Postprocedure Note    Patient: Edmund Anna  MRN: [id number]  YOB: 1960  Date of evaluation: 3/24/2023      Procedure Summary     Date: 03/24/23 Room / Location: [address]    Anesthesia Start: 8512 Anesthesia Stop: 1103    Procedure: [address] 12 WITH STAB PHLEBECTOMIES (Right: Leg Lower) Diagnosis:       Symptomatic varicose veins of right lower extremity      (SYMPTOMATIC VARICOSE VEINS OF RIGHT LOWER EXTREMITY)    Surgeons: Saranya Masters DO Responsible Provider: Anoop Hoang MD    Anesthesia Type: general ASA Status: 3          Anesthesia Type: No value filed.     Meg Phase I: Meg Score: 7    Meg Phase II:        Anesthesia Post Evaluation    Patient location during evaluation: PACU  Patient participation: complete - patient participated  Level of consciousness: awake  Airway patency: patent  Nausea & Vomiting: no nausea  Complications: no  Cardiovascular status: hemodynamically stable  Respiratory status: acceptable  Hydration status: euvolemic  Multimodal analgesia pain management approach

## 2023-03-25 NOTE — OP NOTE
began  by accessing using micropuncture technique the saphenous vein. The  saphenous vein unfortunately becomes extrafascial above the knee. It  was accessed to the point just anterior to the fascia. The  microcatheter followed by a 7-Kinyarwanda sheath were then placed into this  vessel. A 7 x 60 cm catheter was then advanced to the saphenofemoral  junction, withdrawn to 3 cm distal to it. Tumescent fluid was then  instilled around the catheter throughout its entire course. After this,  serial ablations were completed throughout the entire venous system down  to the level of the sheath. After this was removed, we then proceeded  to perform stab phlebectomies, performed 23 phlebectomies over the  medial calf, anterior portion of the lower leg and lateral and distal  aspect of the right leg as well. Total phlebectomies done were once  again #23 and small stab incisions were made with a Belen blade and  then the veins were extruded and passed off or disrupted. After that,  each wound was then irrigated with saline. They were reapproximated  with benzoin and Steri-Strips. The leg was wrapped with fluffs, Kerlix,  and a Coban wrap. The patient was extubated and taken to the recovery  area in stable condition. There were no immediate complications. I was  present and performed all critical aspects of this procedure.         Yolanda Coleman DO    D: 03/24/2023 13:53:21       T: 03/24/2023 21:44:49     [email or web address]  Job#: [id number]     Doc#: [id number]    CC:

## 2023-03-28 ENCOUNTER — PROCEDURE VISIT (OUTPATIENT)
Dept: SURGERY | Age: 63
End: 2023-03-28
Payer: COMMERCIAL

## 2023-03-28 DIAGNOSIS — M79.604 PAIN IN BOTH LOWER EXTREMITIES: ICD-10-CM

## 2023-03-28 DIAGNOSIS — I83.891 SYMPTOMATIC VARICOSE VEINS OF RIGHT LOWER EXTREMITY: Primary | ICD-10-CM

## 2023-03-28 DIAGNOSIS — M79.605 PAIN IN BOTH LOWER EXTREMITIES: ICD-10-CM

## 2023-03-28 DIAGNOSIS — R60.0 LEG EDEMA, RIGHT: ICD-10-CM

## 2023-03-29 ENCOUNTER — TELEPHONE (OUTPATIENT)
Dept: VASCULAR SURGERY | Age: 63
End: 2023-03-29

## 2023-03-29 PROCEDURE — 93971 EXTREMITY STUDY: CPT | Performed by: SURGERY

## 2023-03-29 NOTE — TELEPHONE ENCOUNTER
No DVT  EHIT category 1  Repeat duplex next week to ensure resolution  Monitor for signs/symptoms of DVT including sudden onset leg swelling or shortness of breath

## 2023-04-06 ENCOUNTER — PROCEDURE VISIT (OUTPATIENT)
Dept: SURGERY | Age: 63
End: 2023-04-06
Payer: COMMERCIAL

## 2023-04-06 DIAGNOSIS — R60.0 LEG EDEMA, RIGHT: ICD-10-CM

## 2023-04-06 DIAGNOSIS — I83.891 SYMPTOMATIC VARICOSE VEINS OF RIGHT LOWER EXTREMITY: Primary | ICD-10-CM

## 2023-04-06 PROCEDURE — 93971 EXTREMITY STUDY: CPT | Performed by: SURGERY

## 2023-04-24 ENCOUNTER — OFFICE VISIT (OUTPATIENT)
Dept: VASCULAR SURGERY | Age: 63
End: 2023-04-24

## 2023-04-24 VITALS — HEIGHT: 70 IN | BODY MASS INDEX: 27.85 KG/M2

## 2023-04-24 DIAGNOSIS — Z09 POSTOP CHECK: Primary | ICD-10-CM

## 2023-04-24 PROCEDURE — 99024 POSTOP FOLLOW-UP VISIT: CPT | Performed by: STUDENT IN AN ORGANIZED HEALTH CARE EDUCATION/TRAINING PROGRAM

## 2023-04-24 NOTE — PROGRESS NOTES
Erin Walker (:  1960) is a 60 y.o. female,Established patient, here for evaluation of the following chief complaint(s):  Post-Op Check         ASSESSMENT/PLAN:  1. Postop check    This is a 60year old female patient who presents to the office today for follow up post RFA/phlebectomies. Overall doing well. Still strongly recommend compression wear. Follow up as needed. All questions answered. Subjective   SUBJECTIVE/OBJECTIVE:  This is a 60year old female patient who presents to the office today after undergoing right lower extremity RFA with phlebectomies. Overall she is improved. Her pain is improved. She still has some fullness in her phlebectomy sites but is improving. Her follow up duplex showed a thrombotic plug that receded well on follow up scan. Objective   Physical Exam  Constitutional:       Appearance: Normal appearance. Cardiovascular:      Rate and Rhythm: Normal rate and regular rhythm. Musculoskeletal:      Right lower leg: No edema. Skin:     General: Skin is warm and dry. Comments: Mild fullness in areas of phlebectomies. No erythema or tenderness   Neurological:      Mental Status: She is alert.           Rin Aquino DO, FACS, FSVS, [facility] End[facility]

## 2023-06-02 RX ORDER — ATORVASTATIN CALCIUM 20 MG/1
TABLET, FILM COATED ORAL
Qty: 90 TABLET | Refills: 1 | Status: SHIPPED | OUTPATIENT
Start: 2023-06-02

## 2023-06-02 RX ORDER — AMLODIPINE BESYLATE 10 MG/1
TABLET ORAL
Qty: 90 TABLET | Refills: 1 | Status: SHIPPED | OUTPATIENT
Start: 2023-06-02

## 2023-08-18 ENCOUNTER — TELEPHONE (OUTPATIENT)
Dept: ORTHOPEDIC SURGERY | Age: 63
End: 2023-08-18

## 2023-08-18 NOTE — TELEPHONE ENCOUNTER
General Question     Subject: REFERRAL QUESTIONS  Patient and /or Facility Request: Victoriano Small  Contact Number: [phone]    PT CLLED SAID THAT DR Radha Bass HAD REFERRED HER TO A DOCTOR SHE COULD SEE FOR INJ AND NOW THAT DOCTOR DOESN'T TAKE HER INSURANCE MARY ANN GARCIA  SHE WOULD LIKE DR Luis Rapp TO RECOMMEND A DIFFERENT DOCTOR     PLEASE CLL AT N NUMBER LISTED ABOVE

## 2023-08-30 ENCOUNTER — TELEPHONE (OUTPATIENT)
Dept: FAMILY MEDICINE CLINIC | Age: 63
End: 2023-08-30

## 2023-08-30 DIAGNOSIS — M79.605 LOW BACK PAIN RADIATING TO BOTH LEGS: Primary | ICD-10-CM

## 2023-08-30 DIAGNOSIS — M54.50 LOW BACK PAIN RADIATING TO BOTH LEGS: Primary | ICD-10-CM

## 2023-08-30 DIAGNOSIS — M79.604 LOW BACK PAIN RADIATING TO BOTH LEGS: Primary | ICD-10-CM

## 2023-08-30 NOTE — TELEPHONE ENCOUNTER
Patient has a new pain management doctor because old provider is not in her insurance. She now needs a new referral for Dr Kavitha Boles with [facility].     Please fax new referral to [phone]

## 2023-08-31 NOTE — TELEPHONE ENCOUNTER
Long Santiago advised and states yes it is for her lower back pain radiating to both legs. Referral placed and faxed to [phone].

## 2023-09-07 ENCOUNTER — OFFICE VISIT (OUTPATIENT)
Dept: FAMILY MEDICINE CLINIC | Age: 63
End: 2023-09-07
Payer: COMMERCIAL

## 2023-09-07 VITALS
HEART RATE: 80 BPM | DIASTOLIC BLOOD PRESSURE: 82 MMHG | WEIGHT: 201.8 LBS | BODY MASS INDEX: 28.89 KG/M2 | HEIGHT: 70 IN | SYSTOLIC BLOOD PRESSURE: 130 MMHG | TEMPERATURE: 97.2 F

## 2023-09-07 DIAGNOSIS — E55.9 VITAMIN D DEFICIENCY: ICD-10-CM

## 2023-09-07 DIAGNOSIS — I10 ESSENTIAL HYPERTENSION, BENIGN: Primary | ICD-10-CM

## 2023-09-07 DIAGNOSIS — E78.2 MIXED HYPERLIPIDEMIA: ICD-10-CM

## 2023-09-07 PROCEDURE — 3075F SYST BP GE 130 - 139MM HG: CPT | Performed by: FAMILY MEDICINE

## 2023-09-07 PROCEDURE — 3079F DIAST BP 80-89 MM HG: CPT | Performed by: FAMILY MEDICINE

## 2023-09-07 PROCEDURE — 99214 OFFICE O/P EST MOD 30 MIN: CPT | Performed by: FAMILY MEDICINE

## 2023-09-07 ASSESSMENT — PATIENT HEALTH QUESTIONNAIRE - PHQ9
SUM OF ALL RESPONSES TO PHQ QUESTIONS 1-9: 0
SUM OF ALL RESPONSES TO PHQ9 QUESTIONS 1 & 2: 0
1. LITTLE INTEREST OR PLEASURE IN DOING THINGS: 0
2. FEELING DOWN, DEPRESSED OR HOPELESS: 0

## 2023-09-07 ASSESSMENT — ENCOUNTER SYMPTOMS
ABDOMINAL PAIN: 0
SHORTNESS OF BREATH: 0
DIARRHEA: 0
VOMITING: 0
ABDOMINAL DISTENTION: 0
BLOOD IN STOOL: 0
NAUSEA: 0
CHEST TIGHTNESS: 0
CONSTIPATION: 0

## 2023-09-07 NOTE — PROGRESS NOTES
Subjective:      Patient ID: Yolie Aguillon is a 58 y.o. female.     Chief Complaint   Patient presents with    6 Month Follow-Up     Hypertension, lipids- pt is fasting        Patient presents with:  6 Month Follow-Up: Hypertension, lipids- pt is fasting    Here for the above  She is actually well   No c/o    She has seen several doctors since last visit and doing well     YOB: 1960    Date of Visit:  9/7/2023     -- Peanut-Containing Drug Products -- Rash   -- Soybean-Containing Drug Products -- Rash   -- Ace Inhibitors    -- Aspirin     --  hives   -- Buckwheat    -- Cashew Nut Oil     --  Cashew protein   -- Covid-19 Mrna Vacc (Moderna)    -- Nsaids    -- Other     --  Hazelnut, chick peas    Current Outpatient Medications:  atorvastatin (LIPITOR) 20 MG tablet, TAKE ONE TABLET BY MOUTH DAILY, Disp: 90 tablet, Rfl: 1  amLODIPine (NORVASC) 10 MG tablet, TAKE ONE TABLET BY MOUTH DAILY, Disp: 90 tablet, Rfl: 1  Loratadine (CLARITIN PO), Take by mouth, Disp: , Rfl:   famotidine (PEPCID) 10 MG tablet, Take 1 tablet by mouth daily, Disp: , Rfl:   Cholecalciferol (VITAMIN D3) 1.25 MG (82132 UT) CAPS, Take by mouth, Disp: , Rfl:   azelastine (ASTELIN) 0.1 % nasal spray, azelastine 137 mcg (0.1 %) nasal spray aerosol, Disp: , Rfl:   Fluticasone Furoate (ARNUITY ELLIPTA IN), Inhale 1 puff into the lungs daily Indications: pt is using 200 , Disp: , Rfl:   Phenylephrine HCl (SUDAFED PE CONGESTION PO), Take 1 tablet by mouth as needed, Disp: , Rfl:   acetaminophen (TYLENOL) 325 MG tablet, Take 2 tablets by mouth every 6 hours as needed for Pain, Disp: , Rfl:   fluticasone (FLONASE) 50 MCG/ACT nasal spray, 1 spray by Nasal route daily, Disp: , Rfl:   metoprolol (TOPROL-XL) 25 MG XL tablet, Take 1 tablet by mouth daily, Disp: , Rfl:   EPINEPHrine (EPIPEN) 0.3 MG/0.3ML JO injection, Inject 0.3 mLs into the muscle as needed Use as directed for allergic reaction, Disp: , Rfl:   spironolactone (ALDACTONE) 25 MG

## 2023-09-07 NOTE — PATIENT INSTRUCTIONS
Consider the RSV vaccine   Consider the shingle vaccine   Stay with the medicines  Do remember to get the mammogram   You may be due for a colon recheck this year as well.  Call dr Liliana Kennedy for a check   See in about 6 months

## 2023-11-20 RX ORDER — ATORVASTATIN CALCIUM 20 MG/1
20 TABLET, FILM COATED ORAL DAILY
Qty: 90 TABLET | Refills: 1 | Status: SHIPPED | OUTPATIENT
Start: 2023-11-20

## 2023-11-20 RX ORDER — AMLODIPINE BESYLATE 10 MG/1
10 TABLET ORAL DAILY
Qty: 90 TABLET | Refills: 1 | Status: SHIPPED | OUTPATIENT
Start: 2023-11-20

## 2023-12-01 NOTE — PROGRESS NOTES
Covid testing to be done: If positive---Pt instructed to notify MD ASAP  If negative--pt was instructed to bring Hard copy of Covid results DOP/DOS    Preoperative Screening for Elective Surgery/Invasive Procedures While COVID-19 present in the community     Have you tested positive or have been told to self-isolate for COVID-19 like symptoms within the past 28 days? Do you currently have any of the following symptoms? Fever >100.0 F or 99.9 F in immunocompromised patients? New onset cough, shortness of breath or difficulty breathing? New onset sore throat, myalgia (muscle aches and pains), headache, loss of taste/smell or diarrhea? Have you had a potential exposure to COVID-19 within the past 14 days by:  Close contact with a confirmed case? Close contact with a healthcare worker,  or essential infrastructure worker (grocery store, [employer], gas station, public utilities or transportation)? Do you reside in a congregate setting such as; skilled nursing facility, adult home, correctional facility, homeless shelter or other institutional setting? Have you had recent travel to a known COVID-19 hotspot? Indicate if the patient has a positive screen by answering yes to one or more of the above questions. If patient is unable to obtain a COVID test prior to DOS/DOP will need RAPID DOS. C-Difficile admission screening and protocol:       * Admitted with diarrhea? [] YES    [x]  NO     *Prior history of C-Diff. In last 3 months? [] YES    [x]  NO     *Antibiotic use in the past 6-8 weeks? [x]  NO    []  YES      If yes, which: REASON_________________     *Prior hospitalization or nursing home in the last month? []  YES    [x]  NO     SAFETY FIRST. .call before you fall    [address]    Day of Surgery: Arrival time_12/12/23 1030______     Surgery time_1200______    Do not eat or drink anything after 12:00 midnight prior to your

## 2023-12-01 NOTE — PROGRESS NOTES
[facility] Pre-Admission Testing Electronic Communication Worksheet for OR/ENDO Procedures        Patient: Shannon Handley    DOS: 12/12/23    Arrival Time:  1030    Surgery Time: 1200    Meds to Bed:  [] YES    [x]  NO    Transportation Confirmed: [x] YES    []  NO    History and Physical:  [] YES    []  NO  [] N/A  If yes, please list doctor or Urgent Care and date of H&P: DOS by MD     Additional Clearance(Cardiac, Pulmonary, etc):  [] YES    [x]  NO    Pre-Admission Testing Visit:  [] YES    [x]  NO If no, do labs/testing need to be done DOS?   [] YES    []  NO  UNKNOWN    Medication Reconciliation Complete:  [x] YES    []  NO        Additional Notes:                Interview Complete: [x] YES    []  NO          Shiloh Blanc RN  3:09 PM

## 2023-12-11 ENCOUNTER — ANESTHESIA EVENT (OUTPATIENT)
Dept: ENDOSCOPY | Age: 63
End: 2023-12-11
Payer: COMMERCIAL

## 2023-12-12 ENCOUNTER — HOSPITAL ENCOUNTER (OUTPATIENT)
Age: 63
Setting detail: OUTPATIENT SURGERY
Discharge: HOME | End: 2023-12-12
Attending: INTERNAL MEDICINE | Admitting: INTERNAL MEDICINE
Payer: COMMERCIAL

## 2023-12-12 ENCOUNTER — ANESTHESIA (OUTPATIENT)
Dept: ENDOSCOPY | Age: 63
End: 2023-12-12
Payer: COMMERCIAL

## 2023-12-12 VITALS
RESPIRATION RATE: 17 BRPM | HEART RATE: 86 BPM | WEIGHT: 191.14 LBS | HEIGHT: 70 IN | TEMPERATURE: 97.4 F | BODY MASS INDEX: 27.36 KG/M2 | DIASTOLIC BLOOD PRESSURE: 66 MMHG | SYSTOLIC BLOOD PRESSURE: 118 MMHG | OXYGEN SATURATION: 98 %

## 2023-12-12 DIAGNOSIS — Z12.11 COLON CANCER SCREENING: ICD-10-CM

## 2023-12-12 PROCEDURE — 3609010600 HC COLONOSCOPY POLYPECTOMY SNARE/COLD BIOPSY: Performed by: INTERNAL MEDICINE

## 2023-12-12 PROCEDURE — 7100000011 HC PHASE II RECOVERY - ADDTL 15 MIN: Performed by: INTERNAL MEDICINE

## 2023-12-12 PROCEDURE — 7100000010 HC PHASE II RECOVERY - FIRST 15 MIN: Performed by: INTERNAL MEDICINE

## 2023-12-12 PROCEDURE — 6360000002 HC RX W HCPCS: Performed by: NURSE ANESTHETIST, CERTIFIED REGISTERED

## 2023-12-12 PROCEDURE — 7100000000 HC PACU RECOVERY - FIRST 15 MIN: Performed by: INTERNAL MEDICINE

## 2023-12-12 PROCEDURE — 3700000000 HC ANESTHESIA ATTENDED CARE: Performed by: INTERNAL MEDICINE

## 2023-12-12 PROCEDURE — 2709999900 HC NON-CHARGEABLE SUPPLY: Performed by: INTERNAL MEDICINE

## 2023-12-12 PROCEDURE — 3700000001 HC ADD 15 MINUTES (ANESTHESIA): Performed by: INTERNAL MEDICINE

## 2023-12-12 PROCEDURE — 7100000001 HC PACU RECOVERY - ADDTL 15 MIN: Performed by: INTERNAL MEDICINE

## 2023-12-12 PROCEDURE — 88305 TISSUE EXAM BY PATHOLOGIST: CPT

## 2023-12-12 PROCEDURE — 2580000003 HC RX 258: Performed by: ANESTHESIOLOGY

## 2023-12-12 PROCEDURE — 2580000003 HC RX 258: Performed by: NURSE ANESTHETIST, CERTIFIED REGISTERED

## 2023-12-12 PROCEDURE — 2500000003 HC RX 250 WO HCPCS: Performed by: NURSE ANESTHETIST, CERTIFIED REGISTERED

## 2023-12-12 RX ORDER — ONDANSETRON 2 MG/ML
INJECTION INTRAMUSCULAR; INTRAVENOUS PRN
Status: DISCONTINUED | OUTPATIENT
Start: 2023-12-12 | End: 2023-12-12 | Stop reason: SDUPTHER

## 2023-12-12 RX ORDER — SODIUM CHLORIDE 9 MG/ML
INJECTION, SOLUTION INTRAVENOUS CONTINUOUS PRN
Status: DISCONTINUED | OUTPATIENT
Start: 2023-12-12 | End: 2023-12-12 | Stop reason: SDUPTHER

## 2023-12-12 RX ORDER — ONDANSETRON 2 MG/ML
4 INJECTION INTRAMUSCULAR; INTRAVENOUS
Status: DISCONTINUED | OUTPATIENT
Start: 2023-12-12 | End: 2023-12-12 | Stop reason: HOSPADM

## 2023-12-12 RX ORDER — SODIUM CHLORIDE 0.9 % (FLUSH) 0.9 %
5-40 SYRINGE (ML) INJECTION PRN
Status: DISCONTINUED | OUTPATIENT
Start: 2023-12-12 | End: 2023-12-12 | Stop reason: HOSPADM

## 2023-12-12 RX ORDER — SODIUM CHLORIDE 9 MG/ML
INJECTION, SOLUTION INTRAVENOUS PRN
Status: DISCONTINUED | OUTPATIENT
Start: 2023-12-12 | End: 2023-12-12 | Stop reason: HOSPADM

## 2023-12-12 RX ORDER — PROPOFOL 10 MG/ML
INJECTION, EMULSION INTRAVENOUS PRN
Status: DISCONTINUED | OUTPATIENT
Start: 2023-12-12 | End: 2023-12-12 | Stop reason: SDUPTHER

## 2023-12-12 RX ORDER — LIDOCAINE HYDROCHLORIDE 20 MG/ML
INJECTION, SOLUTION EPIDURAL; INFILTRATION; INTRACAUDAL; PERINEURAL PRN
Status: DISCONTINUED | OUTPATIENT
Start: 2023-12-12 | End: 2023-12-12 | Stop reason: SDUPTHER

## 2023-12-12 RX ORDER — SODIUM CHLORIDE 0.9 % (FLUSH) 0.9 %
5-40 SYRINGE (ML) INJECTION EVERY 12 HOURS SCHEDULED
Status: DISCONTINUED | OUTPATIENT
Start: 2023-12-12 | End: 2023-12-12 | Stop reason: HOSPADM

## 2023-12-12 RX ORDER — GLYCOPYRROLATE 0.2 MG/ML
INJECTION INTRAMUSCULAR; INTRAVENOUS PRN
Status: DISCONTINUED | OUTPATIENT
Start: 2023-12-12 | End: 2023-12-12 | Stop reason: SDUPTHER

## 2023-12-12 RX ORDER — DIPHENHYDRAMINE HYDROCHLORIDE 50 MG/ML
12.5 INJECTION INTRAMUSCULAR; INTRAVENOUS
Status: DISCONTINUED | OUTPATIENT
Start: 2023-12-12 | End: 2023-12-12 | Stop reason: HOSPADM

## 2023-12-12 RX ADMIN — LIDOCAINE HYDROCHLORIDE 80 MG: 20 INJECTION, SOLUTION EPIDURAL; INFILTRATION; INTRACAUDAL; PERINEURAL at 11:40

## 2023-12-12 RX ADMIN — SODIUM CHLORIDE: 9 INJECTION, SOLUTION INTRAVENOUS at 11:36

## 2023-12-12 RX ADMIN — SODIUM CHLORIDE: 9 INJECTION, SOLUTION INTRAVENOUS at 11:09

## 2023-12-12 RX ADMIN — ONDANSETRON 4 MG: 2 INJECTION INTRAMUSCULAR; INTRAVENOUS at 11:57

## 2023-12-12 RX ADMIN — PROPOFOL 80 MG: 10 INJECTION, EMULSION INTRAVENOUS at 11:40

## 2023-12-12 RX ADMIN — GLYCOPYRROLATE 0.2 MG: 0.2 INJECTION, SOLUTION INTRAMUSCULAR; INTRAVENOUS at 11:57

## 2023-12-12 RX ADMIN — PROPOFOL 180 MCG/KG/MIN: 10 INJECTION, EMULSION INTRAVENOUS at 11:41

## 2023-12-12 ASSESSMENT — LIFESTYLE VARIABLES: SMOKING_STATUS: 0

## 2023-12-12 ASSESSMENT — PAIN SCALES - GENERAL
PAINLEVEL_OUTOF10: 0

## 2023-12-12 ASSESSMENT — PAIN - FUNCTIONAL ASSESSMENT: PAIN_FUNCTIONAL_ASSESSMENT: 0-10

## 2023-12-12 ASSESSMENT — PAIN DESCRIPTION - LOCATION: LOCATION: ABDOMEN

## 2023-12-12 NOTE — PROGRESS NOTES
Patient tolerating PO fluids. Patient up to chair with no complications. Patient has no complaints of dizziness at this time.

## 2023-12-12 NOTE — H&P
Pre-operative History and Physical    Patient: Rosana Lanes  : 1960  Acct#:     HISTORY OF PRESENT ILLNESS:    The patient is a 58 y.o. female who presents with history of multiple adenomas with largest being 1cm.   Here for surveillance    Past Medical History:        Diagnosis Date    Aldosteronism ([address])     Allergic rhinitis     Asthma     HYPERCHOLESTERAEMIA     Hypertension     Psychogenic nonepileptic seizure       Past Surgical History:        Procedure Laterality Date    CHOLECYSTECTOMY      COLONOSCOPY  2008    normal dr Ziyad Benitez, had previous polyp in     COLONOSCOPY  2015    polyps, dr Parish Hernandez, repeat 3 years    COLONOSCOPY N/A 2018    COLONOSCOPY POLYPECTOMY SNARE/COLD BIOPSY performed by Alex Herndon MD at [facility]  2003    OTHER SURGICAL HISTORY  2013    uterine ablation    PAIN MANAGEMENT PROCEDURE Left 10/28/2021    LEFT L4 AND L5 TRANSFORAMINAL EPIDURAL STEROID INJECTION WITH FLUOROSCOPY (09908, 17942) performed by Tracy Argueta MD at [address] Left 2021    LEFT L4 AND L5 TRANSFORAMINAL EPIDURAL STEROID INJECTION WITH FLUOROSCOPY performed by Tracy Argueta MD at [address] Bilateral 02/10/2022    BILATERAL S-1 TRANSFORAMINAL EPIDURAL STEROID INJECTION WITH FLUOROSCOPY performed by Tracy Argueta MD at [address] Bilateral 2022    BILATERAL SACRAL1 TRANSFORAMINAL EPIDURAL STEROID INJECTION WITH FLUOROSCOPY performed by Tracy Argueta MD at [address] Bilateral 2022    BILATERAL S1 TRANSFORAMINAL EPIDURAL STEROID INJECTION WITH FLUOROSCOPY performed by Tracy Argueta MD at [address] Right 2023    RADIOFREQUENCY ABLATION OF RIGHT GREATER SAPHENOUS VEIN WITH STAB PHLEBECTOMIES performed by Anushka Garcia DO at [facility]

## 2023-12-12 NOTE — DISCHARGE INSTRUCTIONS
Impression:   -5 polyps removed with largest 1cm. Recommendations:   1. Clear liquid diet, advance as tolerated. 2.  Repeat colonoscopy in 3 years based on polyps today. I put this in.    3.  Please check the [facility] patient portal in 1 week for biopsy results. If you do not know how to check this portal, please call our office for instructions. Vladimir Mead MD,   Cadence Cheng  12/12/2023    Discharge Instructions for Colonoscopy     Colonoscopy is a visual exam of the lining of the large intestine, also called the bowel or colon, with a colonoscope. A colonoscope is a flexible tube with a light and a viewing device. It allows the doctor to view the inside of the colon through a tiny video camera. Colonoscopy is performed for many reasons: unexplained anemia , pain, diarrhea , bloody stools, cancer screening, among many other reasons. Complications from a colonoscopy are rare. Some possible serious complications include perforated bowel (which might require surgery) and bleeding (which could require blood transfusion ). Minor complications include bloating, gas, and cramping that can last for 1-2 days after the procedure. Because air is put into your colon during the procedure, it is normal to pass large amounts of air from your rectum. You may not have a bowel movement for 1-3 days after the procedure. What You Will Need:  Someone to drive you home after the procedure     Steps to Take:  [address] when you get home. Because the sedative will make you drowsy, don't drive, operate machinery, or make important decisions the day of the procedure. Feelings of bloating, gas, or cramping may persist for 24 hours. Diet -  Try sips of water first. If tolerated, resume bland food (scrambled eggs, toast, soup) first.  If tolerated, resume regular diet or the diet recommended by your physician. Do not drink alcohol for 24 hours.    Physical Activity -  Ask your doctor when you

## 2023-12-12 NOTE — ANESTHESIA POSTPROCEDURE EVALUATION
Department of Anesthesiology  Postprocedure Note    Patient: Kassandra Young  MRN: [id number]  YOB: 1960  Date of evaluation: 12/12/2023      Procedure Summary       Date: 12/12/23 Room / Location: [address] [place] [address]    Anesthesia Start: 1137 Anesthesia Stop: 4877    Procedure: COLONOSCOPY POLYPECTOMY SNARE/COLD BIOPSY Diagnosis:       Colon cancer screening      (Colon cancer screening [Z12.11])    Surgeons: Godfrey Wong MD Responsible Provider: Ezio Keith MD    Anesthesia Type: MAC ASA Status: 2            Anesthesia Type: No value filed.     Meg Phase I: Meg Score: 9    Meg Phase II: Meg Score: 9      Anesthesia Post Evaluation    Patient location during evaluation: PACU  Patient participation: complete - patient participated  Level of consciousness: awake and alert  Airway patency: patent  Nausea & Vomiting: no nausea and no vomiting  Complications: no  Cardiovascular status: hemodynamically stable  Respiratory status: acceptable  Hydration status: stable  Pain management: adequate

## 2023-12-12 NOTE — OP NOTE
transverse colon and removed completely with cold snare polypectomy down to a clean base confirmed by post-polypectomy photograph. All polyp tissue sent off for pathologic evaluation. 4.  There was a 5mm polyp identified in the descending colon and removed completely with cold snare polypectomy down to a clean base confirmed by post-polypectomy photograph. All polyp tissue sent off for pathologic evaluation. 5. Small grade 1 internal hemorrhoids. The patient tolerated the procedure well and was taken to Recovery in good condition. No complications. EBL: minimal  Specimens taken: yes      Impression:   -5 polyps removed with largest 1cm. -Floppy colon. Scope looped in the distal sigmoid colon when I got to ascending colon and was so low it was difficult to get pressure in this area and required careful scope manipulation to get to cecum as pressure was ineffective. Recommendations:   1. Clear liquid diet, advance as tolerated. 2.  Repeat colonoscopy in 3 years based on polyps today. I put this in. Would use adult colonoscope and advance slowly and carefully to avoid loops. 3.  Please check the [facility] patient portal in 1 week for biopsy results. If you do not know how to check this portal, please call our office for instructions.     Bay Arteaga MD,   Dusty Mistry  12/12/2023

## 2024-03-07 ENCOUNTER — OFFICE VISIT (OUTPATIENT)
Dept: FAMILY MEDICINE CLINIC | Age: 64
End: 2024-03-07
Payer: COMMERCIAL

## 2024-03-07 VITALS
SYSTOLIC BLOOD PRESSURE: 118 MMHG | TEMPERATURE: 97.2 F | HEART RATE: 76 BPM | HEIGHT: 70 IN | WEIGHT: 196.8 LBS | BODY MASS INDEX: 28.18 KG/M2 | DIASTOLIC BLOOD PRESSURE: 80 MMHG

## 2024-03-07 DIAGNOSIS — Z00.00 WELL ADULT EXAM: Primary | ICD-10-CM

## 2024-03-07 DIAGNOSIS — E55.9 VITAMIN D DEFICIENCY: ICD-10-CM

## 2024-03-07 DIAGNOSIS — H02.403 DROOPY EYELID, BILATERAL: ICD-10-CM

## 2024-03-07 DIAGNOSIS — Z00.00 WELL ADULT EXAM: ICD-10-CM

## 2024-03-07 DIAGNOSIS — I10 ESSENTIAL HYPERTENSION, BENIGN: ICD-10-CM

## 2024-03-07 LAB
25(OH)D3 SERPL-MCNC: 30.7 NG/ML
BASOPHILS # BLD: 0 K/UL (ref 0–0.2)
BASOPHILS NFR BLD: 0.8 %
DEPRECATED RDW RBC AUTO: 19.7 % (ref 12.4–15.4)
EOSINOPHIL # BLD: 0 K/UL (ref 0–0.6)
EOSINOPHIL NFR BLD: 0.8 %
HCT VFR BLD AUTO: 33 % (ref 36–48)
HGB BLD-MCNC: 10.3 G/DL (ref 12–16)
LYMPHOCYTES # BLD: 1.1 K/UL (ref 1–5.1)
LYMPHOCYTES NFR BLD: 20.9 %
MCH RBC QN AUTO: 22.1 PG (ref 26–34)
MCHC RBC AUTO-ENTMCNC: 31.2 G/DL (ref 31–36)
MCV RBC AUTO: 70.7 FL (ref 80–100)
MONOCYTES # BLD: 0.5 K/UL (ref 0–1.3)
MONOCYTES NFR BLD: 8.7 %
NEUTROPHILS # BLD: 3.7 K/UL (ref 1.7–7.7)
NEUTROPHILS NFR BLD: 68.8 %
PLATELET # BLD AUTO: 278 K/UL (ref 135–450)
PMV BLD AUTO: 8.9 FL (ref 5–10.5)
RBC # BLD AUTO: 4.67 M/UL (ref 4–5.2)
WBC # BLD AUTO: 5.4 K/UL (ref 4–11)

## 2024-03-07 PROCEDURE — 3079F DIAST BP 80-89 MM HG: CPT | Performed by: FAMILY MEDICINE

## 2024-03-07 PROCEDURE — 3074F SYST BP LT 130 MM HG: CPT | Performed by: FAMILY MEDICINE

## 2024-03-07 PROCEDURE — 99396 PREV VISIT EST AGE 40-64: CPT | Performed by: FAMILY MEDICINE

## 2024-03-07 RX ORDER — VITAMIN K2 90 MCG
2000 CAPSULE ORAL DAILY
COMMUNITY

## 2024-03-07 SDOH — ECONOMIC STABILITY: FOOD INSECURITY: WITHIN THE PAST 12 MONTHS, THE FOOD YOU BOUGHT JUST DIDN'T LAST AND YOU DIDN'T HAVE MONEY TO GET MORE.: NEVER TRUE

## 2024-03-07 SDOH — ECONOMIC STABILITY: FOOD INSECURITY: WITHIN THE PAST 12 MONTHS, YOU WORRIED THAT YOUR FOOD WOULD RUN OUT BEFORE YOU GOT MONEY TO BUY MORE.: NEVER TRUE

## 2024-03-07 SDOH — ECONOMIC STABILITY: INCOME INSECURITY: HOW HARD IS IT FOR YOU TO PAY FOR THE VERY BASICS LIKE FOOD, HOUSING, MEDICAL CARE, AND HEATING?: NOT HARD AT ALL

## 2024-03-07 ASSESSMENT — PATIENT HEALTH QUESTIONNAIRE - PHQ9
2. FEELING DOWN, DEPRESSED OR HOPELESS: 0
SUM OF ALL RESPONSES TO PHQ QUESTIONS 1-9: 0
SUM OF ALL RESPONSES TO PHQ QUESTIONS 1-9: 0
1. LITTLE INTEREST OR PLEASURE IN DOING THINGS: 0
SUM OF ALL RESPONSES TO PHQ QUESTIONS 1-9: 0
SUM OF ALL RESPONSES TO PHQ QUESTIONS 1-9: 0
SUM OF ALL RESPONSES TO PHQ9 QUESTIONS 1 & 2: 0

## 2024-03-07 ASSESSMENT — ENCOUNTER SYMPTOMS
ABDOMINAL PAIN: 0
BACK PAIN: 0
CONSTIPATION: 0
NAUSEA: 0
VOMITING: 0
CHEST TIGHTNESS: 0
BLOOD IN STOOL: 0
ABDOMINAL DISTENTION: 0
SORE THROAT: 0
SHORTNESS OF BREATH: 0
DIARRHEA: 0
COUGH: 0

## 2024-03-07 NOTE — PROGRESS NOTES
Vitamin D 25 Hydroxy      2. Vitamin D deficiency  Vitamin D 25 Hydroxy      3. Essential hypertension, benign  Comprehensive Metabolic Panel    Lipid Panel    CBC with Auto Differential      4. Droopy eyelid, bilateral            She see allergist   She see derm on regular basis  She does see hematology for the cbc   Nml exam except for drooping eyelid    Utd colon   Nml mammogram on 2.7.24  I will refer to the eye doctor discussed with her       Plan:      See dr molina for the eye check  Continue diet and the medicines  See in about 6 months         Andrea Felder MD

## 2024-03-08 ENCOUNTER — TELEPHONE (OUTPATIENT)
Dept: FAMILY MEDICINE CLINIC | Age: 64
End: 2024-03-08

## 2024-03-08 DIAGNOSIS — R73.09 ELEVATED GLUCOSE: ICD-10-CM

## 2024-03-08 DIAGNOSIS — R73.09 ELEVATED GLUCOSE: Primary | ICD-10-CM

## 2024-03-08 LAB
ALBUMIN SERPL-MCNC: 4.4 G/DL (ref 3.4–5)
ALBUMIN/GLOB SERPL: 1.7 {RATIO} (ref 1.1–2.2)
ALP SERPL-CCNC: 128 U/L (ref 40–129)
ALT SERPL-CCNC: 18 U/L (ref 10–40)
ANION GAP SERPL CALCULATED.3IONS-SCNC: 14 MMOL/L (ref 3–16)
AST SERPL-CCNC: 19 U/L (ref 15–37)
BILIRUB SERPL-MCNC: 0.9 MG/DL (ref 0–1)
BUN SERPL-MCNC: 18 MG/DL (ref 7–20)
CALCIUM SERPL-MCNC: 9.4 MG/DL (ref 8.3–10.6)
CHLORIDE SERPL-SCNC: 106 MMOL/L (ref 99–110)
CHOLEST SERPL-MCNC: 203 MG/DL (ref 0–199)
CO2 SERPL-SCNC: 21 MMOL/L (ref 21–32)
CREAT SERPL-MCNC: 1.2 MG/DL (ref 0.6–1.2)
GFR SERPLBLD CREATININE-BSD FMLA CKD-EPI: 50 ML/MIN/{1.73_M2}
GLUCOSE SERPL-MCNC: 131 MG/DL (ref 70–99)
HDLC SERPL-MCNC: 46 MG/DL (ref 40–60)
LDLC SERPL CALC-MCNC: 135 MG/DL
POTASSIUM SERPL-SCNC: 4.3 MMOL/L (ref 3.5–5.1)
PROT SERPL-MCNC: 7 G/DL (ref 6.4–8.2)
SODIUM SERPL-SCNC: 141 MMOL/L (ref 136–145)
TRIGL SERPL-MCNC: 110 MG/DL (ref 0–150)
VLDLC SERPL CALC-MCNC: 22 MG/DL

## 2024-03-08 NOTE — TELEPHONE ENCOUNTER
----- Message from Sharda Zuluaga sent at 3/8/2024  1:24 PM EST -----  Subject: Message to Provider    QUESTIONS  Information for Provider? Patient says she received a phone call and was   told her hematocrit was low, 35. She assumes this is because she had   phlebotomy on Monday. It was 38.5 then. Has Polycythemia. They took a pint   of blood from her. They do take her Hematocrit there and that's ow she   knows what it was. Just wanted to let Dr. Felder know this. Hoping this   clears that up.   ---------------------------------------------------------------------------  --------------  CALL BACK INFO  [phone]; OK to leave message on voicemail  ---------------------------------------------------------------------------  --------------  SCRIPT ANSWERS  Relationship to Patient? Self

## 2024-03-09 LAB
EST. AVERAGE GLUCOSE BLD GHB EST-MCNC: 128.4 MG/DL
HBA1C MFR BLD: 6.1 %

## 2024-03-11 ENCOUNTER — TELEPHONE (OUTPATIENT)
Dept: FAMILY MEDICINE CLINIC | Age: 64
End: 2024-03-11

## 2024-03-11 DIAGNOSIS — N28.9 RENAL INSUFFICIENCY: Primary | ICD-10-CM

## 2024-03-11 NOTE — TELEPHONE ENCOUNTER
----- Message from Catalina Hall sent at 3/11/2024  2:13 PM EDT -----  Subject: Message to Provider    QUESTIONS  Information for Provider? Patient is returning a call from the office.   ---------------------------------------------------------------------------  --------------  CALL BACK INFO  [phone]; OK to leave message on voicemail  ---------------------------------------------------------------------------  --------------  SCRIPT ANSWERS  undefined

## 2024-05-28 RX ORDER — AMLODIPINE BESYLATE 10 MG/1
10 TABLET ORAL DAILY
Qty: 90 TABLET | Refills: 1 | Status: SHIPPED | OUTPATIENT
Start: 2024-05-28

## 2024-05-28 RX ORDER — ATORVASTATIN CALCIUM 20 MG/1
20 TABLET, FILM COATED ORAL DAILY
Qty: 90 TABLET | Refills: 1 | Status: SHIPPED | OUTPATIENT
Start: 2024-05-28

## 2024-07-22 ENCOUNTER — OFFICE VISIT (OUTPATIENT)
Dept: FAMILY MEDICINE CLINIC | Age: 64
End: 2024-07-22
Payer: COMMERCIAL

## 2024-07-22 VITALS
DIASTOLIC BLOOD PRESSURE: 80 MMHG | WEIGHT: 197.2 LBS | TEMPERATURE: 98.1 F | SYSTOLIC BLOOD PRESSURE: 122 MMHG | HEIGHT: 70 IN | BODY MASS INDEX: 28.23 KG/M2 | HEART RATE: 80 BPM

## 2024-07-22 DIAGNOSIS — H02.403 PTOSIS OF BOTH EYELIDS: ICD-10-CM

## 2024-07-22 DIAGNOSIS — I10 ESSENTIAL HYPERTENSION, BENIGN: ICD-10-CM

## 2024-07-22 DIAGNOSIS — Z01.818 PREOP EXAMINATION: Primary | ICD-10-CM

## 2024-07-22 DIAGNOSIS — N28.9 RENAL INSUFFICIENCY: ICD-10-CM

## 2024-07-22 PROCEDURE — 99213 OFFICE O/P EST LOW 20 MIN: CPT | Performed by: FAMILY MEDICINE

## 2024-07-22 PROCEDURE — 3079F DIAST BP 80-89 MM HG: CPT | Performed by: FAMILY MEDICINE

## 2024-07-22 PROCEDURE — 3074F SYST BP LT 130 MM HG: CPT | Performed by: FAMILY MEDICINE

## 2024-07-22 ASSESSMENT — ENCOUNTER SYMPTOMS
ABDOMINAL PAIN: 0
NAUSEA: 0
VOMITING: 0
ABDOMINAL DISTENTION: 0
SORE THROAT: 0
SHORTNESS OF BREATH: 0
DIARRHEA: 0
COUGH: 0
TROUBLE SWALLOWING: 0
VOICE CHANGE: 0
BLOOD IN STOOL: 0
CHEST TIGHTNESS: 0
CONSTIPATION: 0

## 2024-07-22 NOTE — PROGRESS NOTES
Subjective:      Patient ID: Catalina Cuellar is a 63 y.o. female.    Chief Complaint   Patient presents with    Pre-op Exam     Surgery 8- eye lid surgery bilateral [place] [facility] Dr Wheat [phone]        Patient presents with:  Pre-op Exam: Surgery 8- eye lid surgery bilateral [place] [facility] Dr Jarret [phone]    Here for the above   She has bilateral drooping eyelids   That interferes with vision    Allergies:   -- Ace Inhibitors -- Anaphylaxis   -- Buckwheat -- Anaphylaxis   -- Covid-19 Mrna Vacc (Moderna) -- Anaphylaxis   -- Peanut-Containing Drug Products -- Rash   -- Soybean-Containing Drug Products -- Rash   -- Aspirin     --  hives   -- Cashew Nut Oil -- Itching    --  Cashew protein   -- Nsaids -- Hives   -- Other     --  Hazelnut, chick peas    No tobacco hx. ETOH 1-2 drink per week.      height is 1.778 m (5' 10\") and weight is 89.4 kg (197 lb 3.2 oz). Her temporal temperature is 98.1 °F (36.7 °C). Her blood pressure is 122/80 and her pulse is 80.       Current Outpatient Medications:   ·  amLODIPine (NORVASC) 10 MG tablet, TAKE 1 TABLET BY MOUTH DAILY,   ·  atorvastatin (LIPITOR) 20 MG tablet, TAKE 1 TABLET BY MOUTH DAILY,  ·  vitamin D (VITAMIN D3) 25 MCG (1000 UT) CAPS, Take 2 capsules by mouth daily,  ·  spironolactone (ALDACTONE) 25 MG tablet, Take 1 tablet by mouth 2 times daily,   ·  metoprolol succinate (TOPROL XL) 25 MG extended release tablet, Take 1 tablet by mouth daily,  ·  Loratadine (CLARITIN PO), Take by mouth,  ·  famotidine (PEPCID) 10 MG tablet, Take 1 tablet by mouth daily  ·  azelastine (ASTELIN) 0.1 % nasal spray, azelastine 137 mcg (0.1 %) nasal spray aerosol  ·  Fluticasone Furoate (ARNUITY ELLIPTA IN), Inhale 1 puff into the lungs daily Indications: pt is using 200 ,  ·  Phenylephrine HCl (SUDAFED PE CONGESTION PO), Take 1 tablet by mouth as needed,  ·  acetaminophen (TYLENOL) 325 MG tablet, Take 2 tablets by mouth every 6 hours as needed for Pain  ·

## 2024-07-22 NOTE — PATIENT INSTRUCTIONS
Do get the pneumonia vaccine called Prevnar 20    On the morning of the surgery take the amlodipine, metoprolol and the famotidine with just enough water to get those pills down as soon as you wake up   No vitamin or supplement for a week before the surgery

## 2024-07-23 LAB
ANION GAP SERPL CALCULATED.3IONS-SCNC: 15 MMOL/L (ref 3–16)
BUN SERPL-MCNC: 17 MG/DL (ref 7–20)
CALCIUM SERPL-MCNC: 9.6 MG/DL (ref 8.3–10.6)
CHLORIDE SERPL-SCNC: 108 MMOL/L (ref 99–110)
CO2 SERPL-SCNC: 22 MMOL/L (ref 21–32)
CREAT SERPL-MCNC: 0.9 MG/DL (ref 0.6–1.2)
GFR SERPLBLD CREATININE-BSD FMLA CKD-EPI: 71 ML/MIN/{1.73_M2}
GLUCOSE SERPL-MCNC: 140 MG/DL (ref 70–99)
POTASSIUM SERPL-SCNC: 4.4 MMOL/L (ref 3.5–5.1)
SODIUM SERPL-SCNC: 145 MMOL/L (ref 136–145)

## 2024-08-22 RX ORDER — SPIRONOLACTONE 25 MG/1
25 TABLET ORAL 2 TIMES DAILY
Qty: 180 TABLET | Refills: 1 | Status: SHIPPED | OUTPATIENT
Start: 2024-08-22

## 2024-08-22 RX ORDER — METOPROLOL SUCCINATE 25 MG/1
25 TABLET, EXTENDED RELEASE ORAL DAILY
Qty: 90 TABLET | Refills: 1 | Status: SHIPPED | OUTPATIENT
Start: 2024-08-22

## 2024-09-06 ENCOUNTER — OFFICE VISIT (OUTPATIENT)
Dept: FAMILY MEDICINE CLINIC | Age: 64
End: 2024-09-06
Payer: COMMERCIAL

## 2024-09-06 VITALS
BODY MASS INDEX: 28.35 KG/M2 | HEIGHT: 70 IN | SYSTOLIC BLOOD PRESSURE: 124 MMHG | TEMPERATURE: 98.1 F | DIASTOLIC BLOOD PRESSURE: 80 MMHG | WEIGHT: 198 LBS

## 2024-09-06 DIAGNOSIS — L72.3 INFLAMED EPIDERMOID CYST OF SKIN: Primary | ICD-10-CM

## 2024-09-06 PROCEDURE — 1123F ACP DISCUSS/DSCN MKR DOCD: CPT | Performed by: FAMILY MEDICINE

## 2024-09-06 PROCEDURE — 3079F DIAST BP 80-89 MM HG: CPT | Performed by: FAMILY MEDICINE

## 2024-09-06 PROCEDURE — 99213 OFFICE O/P EST LOW 20 MIN: CPT | Performed by: FAMILY MEDICINE

## 2024-09-06 PROCEDURE — 3074F SYST BP LT 130 MM HG: CPT | Performed by: FAMILY MEDICINE

## 2024-09-06 RX ORDER — CEPHALEXIN 500 MG/1
500 CAPSULE ORAL 3 TIMES DAILY
Qty: 30 CAPSULE | Refills: 0 | Status: SHIPPED | OUTPATIENT
Start: 2024-09-06 | End: 2024-09-16

## 2024-09-06 ASSESSMENT — PATIENT HEALTH QUESTIONNAIRE - PHQ9
2. FEELING DOWN, DEPRESSED OR HOPELESS: NOT AT ALL
SUM OF ALL RESPONSES TO PHQ QUESTIONS 1-9: 0
1. LITTLE INTEREST OR PLEASURE IN DOING THINGS: NOT AT ALL
SUM OF ALL RESPONSES TO PHQ QUESTIONS 1-9: 0
SUM OF ALL RESPONSES TO PHQ9 QUESTIONS 1 & 2: 0

## 2024-09-06 NOTE — PROGRESS NOTES
Subjective:      Patient ID: Catalina Cuellar is a 64 y.o. female.    Chief Complaint   Patient presents with    Check-Up     Hypertension, lipids - pt is fasting        Patient presents with:  Check-Up: Hypertension, lipids - pt is fasting    Here for the above   She is well   Surgery went well     No c/o    Tender cyst or lump on the right face in front of the ear     YOB: 1960    Date of Visit:  9/6/2024     -- Ace Inhibitors -- Anaphylaxis   -- Buckwheat -- Anaphylaxis   -- Covid-19 Mrna Vacc (Moderna) -- Anaphylaxis   -- Peanut-Containing Drug Products -- Rash   -- Soybean-Containing Drug Products -- Rash   -- Aspirin     --  hives   -- Cashew Nut Oil -- Itching    --  Cashew protein   -- Nsaids -- Hives   -- Other     --  Hazelnut, chick peas    Current Outpatient Medications:  metoprolol succinate (TOPROL XL) 25 MG extended release tablet, TAKE 1 TABLET BY MOUTH DAILY, Disp: 90 tablet, Rfl: 1  spironolactone (ALDACTONE) 25 MG tablet, TAKE 1 TABLET BY MOUTH TWICE A DAY, Disp: 180 tablet, Rfl: 1  amLODIPine (NORVASC) 10 MG tablet, TAKE 1 TABLET BY MOUTH DAILY, Disp: 90 tablet, Rfl: 1  atorvastatin (LIPITOR) 20 MG tablet, TAKE 1 TABLET BY MOUTH DAILY, Disp: 90 tablet, Rfl: 1  vitamin D (VITAMIN D3) 25 MCG (1000 UT) CAPS, Take 2 capsules by mouth daily, Disp: , Rfl:   Loratadine (CLARITIN PO), Take by mouth, Disp: , Rfl:   famotidine (PEPCID) 10 MG tablet, Take 1 tablet by mouth daily, Disp: , Rfl:   azelastine (ASTELIN) 0.1 % nasal spray, azelastine 137 mcg (0.1 %) nasal spray aerosol, Disp: , Rfl:   Fluticasone Furoate (ARNUITY ELLIPTA IN), Inhale 1 puff into the lungs daily Indications: pt is using 200 , Disp: , Rfl:   Phenylephrine HCl (SUDAFED PE CONGESTION PO), Take 1 tablet by mouth as needed, Disp: , Rfl:   acetaminophen (TYLENOL) 325 MG tablet, Take 2 tablets by mouth every 6 hours as needed for Pain, Disp: , Rfl:   fluticasone (FLONASE) 50 MCG/ACT nasal spray, 1 spray by Nasal route

## 2024-11-19 RX ORDER — AMLODIPINE BESYLATE 10 MG/1
10 TABLET ORAL DAILY
Qty: 90 TABLET | Refills: 1 | Status: SHIPPED | OUTPATIENT
Start: 2024-11-19

## 2024-11-19 RX ORDER — ATORVASTATIN CALCIUM 20 MG/1
20 TABLET, FILM COATED ORAL DAILY
Qty: 90 TABLET | Refills: 1 | Status: SHIPPED | OUTPATIENT
Start: 2024-11-19

## 2025-01-22 ENCOUNTER — TELEPHONE (OUTPATIENT)
Dept: FAMILY MEDICINE CLINIC | Age: 65
End: 2025-01-22

## 2025-02-20 RX ORDER — SPIRONOLACTONE 25 MG/1
25 TABLET ORAL 2 TIMES DAILY
Qty: 180 TABLET | Refills: 1 | Status: SHIPPED | OUTPATIENT
Start: 2025-02-20

## 2025-02-28 RX ORDER — METOPROLOL SUCCINATE 25 MG/1
25 TABLET, EXTENDED RELEASE ORAL DAILY
Qty: 90 TABLET | Refills: 1 | Status: SHIPPED | OUTPATIENT
Start: 2025-02-28

## 2025-04-08 SDOH — HEALTH STABILITY: PHYSICAL HEALTH
ON AVERAGE, HOW MANY DAYS PER WEEK DO YOU ENGAGE IN MODERATE TO STRENUOUS EXERCISE (LIKE A BRISK WALK)?: PATIENT DECLINED

## 2025-04-11 ENCOUNTER — OFFICE VISIT (OUTPATIENT)
Dept: FAMILY MEDICINE CLINIC | Age: 65
End: 2025-04-11
Payer: MEDICARE

## 2025-04-11 VITALS
SYSTOLIC BLOOD PRESSURE: 126 MMHG | OXYGEN SATURATION: 97 % | WEIGHT: 188.6 LBS | HEART RATE: 75 BPM | DIASTOLIC BLOOD PRESSURE: 84 MMHG | BODY MASS INDEX: 27 KG/M2 | HEIGHT: 70 IN

## 2025-04-11 DIAGNOSIS — E55.9 VITAMIN D DEFICIENCY: ICD-10-CM

## 2025-04-11 DIAGNOSIS — Z76.89 ENCOUNTER TO ESTABLISH CARE: Primary | ICD-10-CM

## 2025-04-11 DIAGNOSIS — E78.2 MIXED HYPERLIPIDEMIA: ICD-10-CM

## 2025-04-11 DIAGNOSIS — I10 ESSENTIAL HYPERTENSION, BENIGN: ICD-10-CM

## 2025-04-11 DIAGNOSIS — R73.03 PRE-DIABETES: ICD-10-CM

## 2025-04-11 PROBLEM — M79.604 LEG PAIN, CENTRAL, RIGHT: Status: RESOLVED | Noted: 2022-10-14 | Resolved: 2025-04-11

## 2025-04-11 PROBLEM — R60.0 LEG EDEMA, RIGHT: Status: RESOLVED | Noted: 2022-10-14 | Resolved: 2025-04-11

## 2025-04-11 LAB
25(OH)D3 SERPL-MCNC: 64 NG/ML
ALBUMIN SERPL-MCNC: 4.7 G/DL (ref 3.4–5)
ALBUMIN/GLOB SERPL: 1.7 {RATIO} (ref 1.1–2.2)
ALP SERPL-CCNC: 117 U/L (ref 40–129)
ALT SERPL-CCNC: 23 U/L (ref 10–40)
ANION GAP SERPL CALCULATED.3IONS-SCNC: 13 MMOL/L (ref 3–16)
AST SERPL-CCNC: 22 U/L (ref 15–37)
BASOPHILS # BLD: 0 K/UL (ref 0–0.2)
BASOPHILS NFR BLD: 0.9 %
BILIRUB SERPL-MCNC: 1.4 MG/DL (ref 0–1)
BUN SERPL-MCNC: 18 MG/DL (ref 7–20)
CALCIUM SERPL-MCNC: 9.8 MG/DL (ref 8.3–10.6)
CHLORIDE SERPL-SCNC: 103 MMOL/L (ref 99–110)
CHOLEST SERPL-MCNC: 203 MG/DL (ref 0–199)
CO2 SERPL-SCNC: 26 MMOL/L (ref 21–32)
CREAT SERPL-MCNC: 0.9 MG/DL (ref 0.6–1.2)
DEPRECATED RDW RBC AUTO: 22.2 % (ref 12.4–15.4)
EOSINOPHIL # BLD: 0.1 K/UL (ref 0–0.6)
EOSINOPHIL NFR BLD: 1.1 %
EST. AVERAGE GLUCOSE BLD GHB EST-MCNC: 128.4 MG/DL
GFR SERPLBLD CREATININE-BSD FMLA CKD-EPI: 71 ML/MIN/{1.73_M2}
GLUCOSE SERPL-MCNC: 120 MG/DL (ref 70–99)
HBA1C MFR BLD: 6.1 %
HCT VFR BLD AUTO: 38.6 % (ref 36–48)
HDLC SERPL-MCNC: 54 MG/DL (ref 40–60)
HGB BLD-MCNC: 12.3 G/DL (ref 12–16)
LDLC SERPL CALC-MCNC: 124 MG/DL
LYMPHOCYTES # BLD: 1.3 K/UL (ref 1–5.1)
LYMPHOCYTES NFR BLD: 24.8 %
MCH RBC QN AUTO: 23.5 PG (ref 26–34)
MCHC RBC AUTO-ENTMCNC: 31.9 G/DL (ref 31–36)
MCV RBC AUTO: 73.6 FL (ref 80–100)
MONOCYTES # BLD: 0.5 K/UL (ref 0–1.3)
MONOCYTES NFR BLD: 9.9 %
NEUTROPHILS # BLD: 3.2 K/UL (ref 1.7–7.7)
NEUTROPHILS NFR BLD: 63.3 %
PLATELET # BLD AUTO: 243 K/UL (ref 135–450)
PMV BLD AUTO: 9.2 FL (ref 5–10.5)
POTASSIUM SERPL-SCNC: 4.4 MMOL/L (ref 3.5–5.1)
PROT SERPL-MCNC: 7.4 G/DL (ref 6.4–8.2)
RBC # BLD AUTO: 5.24 M/UL (ref 4–5.2)
SODIUM SERPL-SCNC: 142 MMOL/L (ref 136–145)
TRIGL SERPL-MCNC: 123 MG/DL (ref 0–150)
VLDLC SERPL CALC-MCNC: 25 MG/DL
WBC # BLD AUTO: 5.1 K/UL (ref 4–11)

## 2025-04-11 PROCEDURE — 3074F SYST BP LT 130 MM HG: CPT

## 2025-04-11 PROCEDURE — G8419 CALC BMI OUT NRM PARAM NOF/U: HCPCS

## 2025-04-11 PROCEDURE — 3079F DIAST BP 80-89 MM HG: CPT

## 2025-04-11 PROCEDURE — G8399 PT W/DXA RESULTS DOCUMENT: HCPCS

## 2025-04-11 PROCEDURE — 3017F COLORECTAL CA SCREEN DOC REV: CPT

## 2025-04-11 PROCEDURE — 1036F TOBACCO NON-USER: CPT

## 2025-04-11 PROCEDURE — G8427 DOCREV CUR MEDS BY ELIG CLIN: HCPCS

## 2025-04-11 PROCEDURE — 1090F PRES/ABSN URINE INCON ASSESS: CPT

## 2025-04-11 PROCEDURE — 1123F ACP DISCUSS/DSCN MKR DOCD: CPT

## 2025-04-11 PROCEDURE — 99214 OFFICE O/P EST MOD 30 MIN: CPT

## 2025-04-11 SDOH — ECONOMIC STABILITY: FOOD INSECURITY: WITHIN THE PAST 12 MONTHS, YOU WORRIED THAT YOUR FOOD WOULD RUN OUT BEFORE YOU GOT MONEY TO BUY MORE.: NEVER TRUE

## 2025-04-11 SDOH — ECONOMIC STABILITY: FOOD INSECURITY: WITHIN THE PAST 12 MONTHS, THE FOOD YOU BOUGHT JUST DIDN'T LAST AND YOU DIDN'T HAVE MONEY TO GET MORE.: NEVER TRUE

## 2025-04-11 ASSESSMENT — ENCOUNTER SYMPTOMS
APNEA: 0
VOMITING: 0
SORE THROAT: 0
CONSTIPATION: 0
BLOOD IN STOOL: 0
SINUS PRESSURE: 0
TROUBLE SWALLOWING: 0
WHEEZING: 0
BACK PAIN: 1
NAUSEA: 0
COLOR CHANGE: 0
DIARRHEA: 0
COUGH: 0
ABDOMINAL PAIN: 0
SHORTNESS OF BREATH: 0

## 2025-04-11 ASSESSMENT — PATIENT HEALTH QUESTIONNAIRE - PHQ9
SUM OF ALL RESPONSES TO PHQ QUESTIONS 1-9: 0
SUM OF ALL RESPONSES TO PHQ QUESTIONS 1-9: 0
1. LITTLE INTEREST OR PLEASURE IN DOING THINGS: NOT AT ALL
SUM OF ALL RESPONSES TO PHQ QUESTIONS 1-9: 0
SUM OF ALL RESPONSES TO PHQ QUESTIONS 1-9: 0
2. FEELING DOWN, DEPRESSED OR HOPELESS: NOT AT ALL

## 2025-04-11 NOTE — PROGRESS NOTES
Pupils: Pupils are equal, round, and reactive to light.   Cardiovascular:      Rate and Rhythm: Normal rate and regular rhythm.      Pulses: Normal pulses.      Heart sounds: Normal heart sounds.   Pulmonary:      Effort: Pulmonary effort is normal. No respiratory distress.      Breath sounds: Normal breath sounds. No wheezing or rhonchi.   Abdominal:      General: Abdomen is flat. Bowel sounds are normal. There is no distension.      Palpations: Abdomen is soft.      Tenderness: There is no abdominal tenderness.   Musculoskeletal:         General: No swelling. Normal range of motion.      Cervical back: Normal range of motion. No rigidity or tenderness.      Right lower leg: No edema.      Left lower leg: No edema.   Lymphadenopathy:      Cervical: No cervical adenopathy.   Skin:     General: Skin is warm.      Capillary Refill: Capillary refill takes less than 2 seconds.      Coloration: Skin is not jaundiced.      Findings: No rash.   Neurological:      General: No focal deficit present.      Mental Status: She is alert and oriented to person, place, and time.      Motor: No weakness.   Psychiatric:         Mood and Affect: Mood normal.         Behavior: Behavior is cooperative.         Thought Content: Thought content normal.         Judgment: Judgment normal.                 An electronic signature was used to authenticate this note.    --ELADIO Saini - JACKELINE

## 2025-04-11 NOTE — PATIENT INSTRUCTIONS
Thank you for choosing [facility].    Please bring a current list of medications to every appointment.    Please contact your pharmacy for any prescription refill(s) that you are requesting.

## 2025-04-11 NOTE — ASSESSMENT & PLAN NOTE
Catalina presents to establish care, reviewed hx, vitals, medications, labs.  Labs ordered today  Recommend updated t dap  Continue to monitor diet

## 2025-04-11 NOTE — ASSESSMENT & PLAN NOTE
Chronic, at goal (stable), continue current treatment plan and medication adherence emphasized. Labs ordered

## 2025-04-11 NOTE — ASSESSMENT & PLAN NOTE
Chronic, at goal (stable), continue current treatment plan and lifestyle modifications recommended, lipid profile ordered. Continue to monitor diet

## 2025-04-14 ENCOUNTER — RESULTS FOLLOW-UP (OUTPATIENT)
Dept: FAMILY MEDICINE CLINIC | Age: 65
End: 2025-04-14

## 2025-05-25 RX ORDER — ATORVASTATIN CALCIUM 20 MG/1
20 TABLET, FILM COATED ORAL DAILY
Qty: 90 TABLET | Refills: 0 | Status: SHIPPED | OUTPATIENT
Start: 2025-05-25

## 2025-05-25 RX ORDER — AMLODIPINE BESYLATE 10 MG/1
10 TABLET ORAL DAILY
Qty: 90 TABLET | Refills: 0 | Status: SHIPPED | OUTPATIENT
Start: 2025-05-25

## 2025-07-21 ENCOUNTER — TELEPHONE (OUTPATIENT)
Dept: FAMILY MEDICINE CLINIC | Age: 65
End: 2025-07-21

## 2025-07-21 NOTE — TELEPHONE ENCOUNTER
Patient due for Initial AWV \"Welcome to Medicare\".   LM for patient to call the office regarding appointment. Patient can keep 10- appointment change to AWV \"Welcome to \".

## 2025-09-02 RX ORDER — SPIRONOLACTONE 25 MG/1
25 TABLET ORAL 2 TIMES DAILY
Qty: 180 TABLET | Refills: 1 | Status: SHIPPED | OUTPATIENT
Start: 2025-09-02

## 2025-09-02 RX ORDER — METOPROLOL SUCCINATE 25 MG/1
25 TABLET, EXTENDED RELEASE ORAL DAILY
Qty: 90 TABLET | Refills: 1 | Status: SHIPPED | OUTPATIENT
Start: 2025-09-02

## 2025-09-02 RX ORDER — ATORVASTATIN CALCIUM 20 MG/1
20 TABLET, FILM COATED ORAL DAILY
Qty: 90 TABLET | Refills: 2 | Status: SHIPPED | OUTPATIENT
Start: 2025-09-02

## 2025-09-02 RX ORDER — AMLODIPINE BESYLATE 10 MG/1
10 TABLET ORAL DAILY
Qty: 90 TABLET | Refills: 2 | Status: SHIPPED | OUTPATIENT
Start: 2025-09-02

## (undated) DEVICE — SET INTRO SHTH MIC L7CM DIA7FR 0.018IN NDL L2.75IN DIA21GA

## (undated) DEVICE — STRIP,CLOSURE,WOUND,MEDI-STRIP,1/2X4: Brand: MEDLINE

## (undated) DEVICE — SYRINGE MED 10ML LUERLOCK TIP W/O SFTY DISP

## (undated) DEVICE — UNIVERSAL DRAPE: Brand: MEDLINE INDUSTRIES, INC.

## (undated) DEVICE — SYRINGE MED 3ML CLR PLAS LUERLOCK CONN VI ACCS INTLNK 15GA

## (undated) DEVICE — PACK DRP UNIV W BK TBL MAYO STD BTM TOP SIDE UTIL CVR ZONE

## (undated) DEVICE — CHLORAPREP 26ML ORANGE

## (undated) DEVICE — FORCEPS BX 240CM 2.4MM L NDL RAD JAW 4 M00513334

## (undated) DEVICE — BANDAGE,GAUZE,BULKEE II,4.5"X4.1YD,STRL: Brand: MEDLINE

## (undated) DEVICE — PORT VLV 2 W NDL FREE SMRTSITE

## (undated) DEVICE — CONTAINER SPEC 480ML CLR POLYSTYR 10% NEUT BUFF FRMLN ZN

## (undated) DEVICE — Device: Brand: JELCO

## (undated) DEVICE — SHEET,DRAPE,53X77,STERILE: Brand: MEDLINE

## (undated) DEVICE — NEEDLE SPNL 22GA L3.5IN BLK HUB S STL REG WALL FIT STYL W/

## (undated) DEVICE — GLOVE ORANGE PI 8   MSG9080

## (undated) DEVICE — STOCKING COMPR 2 REG

## (undated) DEVICE — APPLICATOR MEDICATED 26 CC SOLUTION HI LT ORNG CHLORAPREP

## (undated) DEVICE — SET ADMIN NEEDLESS Y SITE RLER CLMP M SPIN LOK 10 GTT LEN

## (undated) DEVICE — STERILE POLYISOPRENE POWDER-FREE SURGICAL GLOVES: Brand: PROTEXIS

## (undated) DEVICE — STANDARD HYPODERMIC NEEDLE,POLYPROPYLENE HUB: Brand: MONOJECT

## (undated) DEVICE — INTENDED FOR TISSUE SEPARATION, AND OTHER PROCEDURES THAT REQUIRE A SHARP SURGICAL BLADE TO PUNCTURE OR CUT.: Brand: BARD-PARKER ® STAINLESS STEEL BLADES

## (undated) DEVICE — NEEDLE SPNL 22GA L5IN BLK HUB S STL W/ QNCKE PNT W/OUT

## (undated) DEVICE — MINOR SET UP: Brand: MEDLINE INDUSTRIES, INC.

## (undated) DEVICE — MEDIA CONTRAST RX ISOVUE-300 61% 30ML VIALS

## (undated) DEVICE — TOWEL,OR,DSP,ST,BLUE,STD,4/PK,20PK/CS: Brand: MEDLINE

## (undated) DEVICE — AVANOS* EXTENSION SETS: Brand: EXTENSION SET, MINI BORE, 12" LENGTH, 0.50ML VOLUME 25

## (undated) DEVICE — 3M™ COBAN™ NL STERILE NON-LATEX SELF-ADHERENT WRAP, 2086S, 6 IN X 5 YD (15 CM X 4,5 M), 12 ROLLS/CASE: Brand: 3M™ COBAN™

## (undated) DEVICE — ENDO CARRY-ON PROCEDURE KIT: Brand: ENDO CARRY-ON PROCEDURE KIT

## (undated) DEVICE — INTRODUCER CATH MIC L45CM DIA4FR TIP L7CM B BVL S STL WIRE

## (undated) DEVICE — ENDOSCOPY KIT: Brand: MEDLINE INDUSTRIES, INC.

## (undated) DEVICE — SNARE ENDOSCP 11 MM BRAIDED WIRE CAPTFLX DISP

## (undated) DEVICE — TRAY ANES SUPP NO DRUG CUST

## (undated) DEVICE — TRAY ST200 BRAUN SUPPORT

## (undated) DEVICE — FORMALIN CLEAR VIAL 20 ML 10%

## (undated) DEVICE — CLOSURE FAST CATH 7FX60

## (undated) DEVICE — GAUZE FLUFF 2 PLY: Brand: DEROYAL

## (undated) DEVICE — SOLUTION IV IRRIG POUR BRL 0.9% SODIUM CHL 2F7124

## (undated) DEVICE — PROVE COVER: Brand: UNBRANDED